# Patient Record
Sex: FEMALE | Race: WHITE | Employment: FULL TIME | URBAN - METROPOLITAN AREA
[De-identification: names, ages, dates, MRNs, and addresses within clinical notes are randomized per-mention and may not be internally consistent; named-entity substitution may affect disease eponyms.]

---

## 2017-06-28 ENCOUNTER — HOSPITAL ENCOUNTER (EMERGENCY)
Age: 57
Discharge: OTHER INSTITUTION | End: 2017-06-28
Attending: EMERGENCY MEDICINE
Payer: COMMERCIAL

## 2017-06-28 VITALS
RESPIRATION RATE: 20 BRPM | OXYGEN SATURATION: 96 % | HEART RATE: 82 BPM | WEIGHT: 293 LBS | TEMPERATURE: 99.7 F | SYSTOLIC BLOOD PRESSURE: 133 MMHG | DIASTOLIC BLOOD PRESSURE: 59 MMHG

## 2017-06-28 DIAGNOSIS — T50.905A DRUG REACTION, INITIAL ENCOUNTER: ICD-10-CM

## 2017-06-28 DIAGNOSIS — T81.40XA POSTOPERATIVE INFECTION, INITIAL ENCOUNTER: Primary | ICD-10-CM

## 2017-06-28 LAB
ALBUMIN SERPL-MCNC: 3.6 G/DL (ref 3.9–4.9)
ALP BLD-CCNC: 123 U/L (ref 40–130)
ALT SERPL-CCNC: 34 U/L (ref 0–33)
ANION GAP SERPL CALCULATED.3IONS-SCNC: 11 MEQ/L (ref 7–13)
APTT: 29.7 SEC (ref 21.6–35.4)
AST SERPL-CCNC: 22 U/L (ref 0–35)
BASOPHILS ABSOLUTE: 0 K/UL (ref 0–0.2)
BASOPHILS RELATIVE PERCENT: 0.2 %
BILIRUB SERPL-MCNC: 0.7 MG/DL (ref 0–1.2)
BUN BLDV-MCNC: 12 MG/DL (ref 6–20)
CALCIUM SERPL-MCNC: 8.7 MG/DL (ref 8.6–10.2)
CHLORIDE BLD-SCNC: 94 MEQ/L (ref 98–107)
CO2: 25 MEQ/L (ref 22–29)
CREAT SERPL-MCNC: 0.7 MG/DL (ref 0.5–0.9)
EOSINOPHILS ABSOLUTE: 0.2 K/UL (ref 0–0.7)
EOSINOPHILS RELATIVE PERCENT: 4.5 %
GFR AFRICAN AMERICAN: >60
GFR NON-AFRICAN AMERICAN: >60
GLOBULIN: 2.7 G/DL (ref 2.3–3.5)
GLUCOSE BLD-MCNC: 126 MG/DL (ref 74–109)
HCT VFR BLD CALC: 40 % (ref 37–47)
HEMOGLOBIN: 13.4 G/DL (ref 12–16)
INR BLD: 1.1
LACTIC ACID: 1 MMOL/L (ref 0.5–2.2)
LYMPHOCYTES ABSOLUTE: 0.3 K/UL (ref 1–4.8)
LYMPHOCYTES RELATIVE PERCENT: 6 %
MCH RBC QN AUTO: 25.7 PG (ref 27–31.3)
MCHC RBC AUTO-ENTMCNC: 33.5 % (ref 33–37)
MCV RBC AUTO: 76.8 FL (ref 82–100)
MONOCYTES ABSOLUTE: 0.4 K/UL (ref 0.2–0.8)
MONOCYTES RELATIVE PERCENT: 6.6 %
NEUTROPHILS ABSOLUTE: 4.5 K/UL (ref 1.4–6.5)
NEUTROPHILS RELATIVE PERCENT: 82.7 %
PDW BLD-RTO: 16.6 % (ref 11.5–14.5)
PLATELET # BLD: 151 K/UL (ref 130–400)
POTASSIUM SERPL-SCNC: 3.8 MEQ/L (ref 3.5–5.1)
PROTHROMBIN TIME: 11.9 SEC (ref 8.1–13.7)
RBC # BLD: 5.2 M/UL (ref 4.2–5.4)
SLIDE REVIEW: ABNORMAL
SODIUM BLD-SCNC: 130 MEQ/L (ref 132–144)
TOTAL PROTEIN: 6.3 G/DL (ref 6.4–8.1)
WBC # BLD: 5.4 K/UL (ref 4.8–10.8)

## 2017-06-28 PROCEDURE — 85730 THROMBOPLASTIN TIME PARTIAL: CPT

## 2017-06-28 PROCEDURE — 6370000000 HC RX 637 (ALT 250 FOR IP): Performed by: EMERGENCY MEDICINE

## 2017-06-28 PROCEDURE — 96365 THER/PROPH/DIAG IV INF INIT: CPT

## 2017-06-28 PROCEDURE — 96366 THER/PROPH/DIAG IV INF ADDON: CPT

## 2017-06-28 PROCEDURE — 36415 COLL VENOUS BLD VENIPUNCTURE: CPT

## 2017-06-28 PROCEDURE — 87040 BLOOD CULTURE FOR BACTERIA: CPT

## 2017-06-28 PROCEDURE — 85025 COMPLETE CBC W/AUTO DIFF WBC: CPT

## 2017-06-28 PROCEDURE — 85610 PROTHROMBIN TIME: CPT

## 2017-06-28 PROCEDURE — 80053 COMPREHEN METABOLIC PANEL: CPT

## 2017-06-28 PROCEDURE — 6360000002 HC RX W HCPCS: Performed by: PHYSICIAN ASSISTANT

## 2017-06-28 PROCEDURE — 2580000003 HC RX 258: Performed by: PHYSICIAN ASSISTANT

## 2017-06-28 PROCEDURE — 83605 ASSAY OF LACTIC ACID: CPT

## 2017-06-28 PROCEDURE — 99283 EMERGENCY DEPT VISIT LOW MDM: CPT

## 2017-06-28 PROCEDURE — 96375 TX/PRO/DX INJ NEW DRUG ADDON: CPT

## 2017-06-28 RX ORDER — DOCUSATE SODIUM 100 MG/1
100 CAPSULE, LIQUID FILLED ORAL DAILY
Status: ON HOLD | COMMUNITY
End: 2019-01-06

## 2017-06-28 RX ORDER — ONDANSETRON 2 MG/ML
4 INJECTION INTRAMUSCULAR; INTRAVENOUS ONCE
Status: COMPLETED | OUTPATIENT
Start: 2017-06-28 | End: 2017-06-28

## 2017-06-28 RX ORDER — SULFAMETHOXAZOLE AND TRIMETHOPRIM 800; 160 MG/1; MG/1
1 TABLET ORAL 2 TIMES DAILY
COMMUNITY
End: 2017-07-30

## 2017-06-28 RX ORDER — LOSARTAN POTASSIUM 100 MG/1
100 TABLET ORAL DAILY
Status: ON HOLD | COMMUNITY
End: 2019-01-16 | Stop reason: HOSPADM

## 2017-06-28 RX ORDER — DIPHENHYDRAMINE HYDROCHLORIDE 50 MG/ML
25 INJECTION INTRAMUSCULAR; INTRAVENOUS ONCE
Status: COMPLETED | OUTPATIENT
Start: 2017-06-28 | End: 2017-06-28

## 2017-06-28 RX ORDER — ETODOLAC 400 MG/1
400 TABLET, FILM COATED ORAL 2 TIMES DAILY PRN
Status: ON HOLD | COMMUNITY
End: 2019-01-06

## 2017-06-28 RX ORDER — DULOXETIN HYDROCHLORIDE 30 MG/1
30 CAPSULE, DELAYED RELEASE ORAL DAILY
Status: ON HOLD | COMMUNITY
End: 2019-02-06 | Stop reason: HOSPADM

## 2017-06-28 RX ORDER — LEVOTHYROXINE SODIUM 0.03 MG/1
50 TABLET ORAL DAILY
COMMUNITY

## 2017-06-28 RX ORDER — AMLODIPINE BESYLATE 5 MG/1
5 TABLET ORAL DAILY
Status: ON HOLD | COMMUNITY
End: 2019-01-06

## 2017-06-28 RX ORDER — BUPROPION HYDROCHLORIDE 300 MG/1
150 TABLET ORAL EVERY MORNING
Status: ON HOLD | COMMUNITY
End: 2019-02-06 | Stop reason: HOSPADM

## 2017-06-28 RX ORDER — GABAPENTIN 100 MG/1
100 CAPSULE ORAL 3 TIMES DAILY
Status: ON HOLD | COMMUNITY
End: 2019-01-06

## 2017-06-28 RX ORDER — ACETAMINOPHEN 500 MG
1000 TABLET ORAL ONCE
Status: COMPLETED | OUTPATIENT
Start: 2017-06-28 | End: 2017-06-28

## 2017-06-28 RX ORDER — POLYETHYLENE GLYCOL 3350 17 G/17G
17 POWDER, FOR SOLUTION ORAL DAILY PRN
Status: ON HOLD | COMMUNITY
End: 2019-01-06

## 2017-06-28 RX ORDER — OXYCODONE HYDROCHLORIDE AND ACETAMINOPHEN 5; 325 MG/1; MG/1
1 TABLET ORAL EVERY 8 HOURS PRN
Status: ON HOLD | COMMUNITY
End: 2019-01-06

## 2017-06-28 RX ORDER — CHLORTHALIDONE 25 MG/1
25 TABLET ORAL 2 TIMES DAILY
Status: ON HOLD | COMMUNITY
End: 2019-01-06

## 2017-06-28 RX ORDER — OXYCODONE HCL 10 MG/1
10 TABLET, FILM COATED, EXTENDED RELEASE ORAL EVERY 12 HOURS PRN
Status: ON HOLD | COMMUNITY
End: 2019-01-06 | Stop reason: ALTCHOICE

## 2017-06-28 RX ADMIN — DIPHENHYDRAMINE HYDROCHLORIDE 25 MG: 50 INJECTION, SOLUTION INTRAMUSCULAR; INTRAVENOUS at 15:46

## 2017-06-28 RX ADMIN — ONDANSETRON 4 MG: 2 INJECTION INTRAMUSCULAR; INTRAVENOUS at 15:46

## 2017-06-28 RX ADMIN — VANCOMYCIN HYDROCHLORIDE 1000 MG: 1 INJECTION, POWDER, LYOPHILIZED, FOR SOLUTION INTRAVENOUS at 15:47

## 2017-06-28 RX ADMIN — ACETAMINOPHEN 1000 MG: 500 TABLET ORAL at 17:01

## 2017-06-28 ASSESSMENT — PAIN SCALES - GENERAL
PAINLEVEL_OUTOF10: 8
PAINLEVEL_OUTOF10: 8
PAINLEVEL_OUTOF10: 5

## 2017-06-28 ASSESSMENT — PAIN DESCRIPTION - PAIN TYPE: TYPE: ACUTE PAIN

## 2017-06-28 ASSESSMENT — PAIN DESCRIPTION - LOCATION: LOCATION: NECK

## 2017-06-28 ASSESSMENT — PAIN DESCRIPTION - DESCRIPTORS: DESCRIPTORS: SHOOTING

## 2017-06-30 ASSESSMENT — ENCOUNTER SYMPTOMS
PHOTOPHOBIA: 0
COUGH: 0
SORE THROAT: 0
TROUBLE SWALLOWING: 0
SHORTNESS OF BREATH: 0
COLOR CHANGE: 1
NAUSEA: 0
VOMITING: 0
DIARRHEA: 0
ABDOMINAL PAIN: 0
BACK PAIN: 0

## 2017-07-03 LAB
BLOOD CULTURE, ROUTINE: NORMAL
CULTURE, BLOOD 2: NORMAL

## 2017-07-30 ENCOUNTER — APPOINTMENT (OUTPATIENT)
Dept: CT IMAGING | Age: 57
End: 2017-07-30
Payer: COMMERCIAL

## 2017-07-30 ENCOUNTER — HOSPITAL ENCOUNTER (EMERGENCY)
Age: 57
Discharge: HOME | End: 2017-07-30
Attending: FAMILY MEDICINE
Payer: COMMERCIAL

## 2017-07-30 VITALS
TEMPERATURE: 98.1 F | OXYGEN SATURATION: 99 % | BODY MASS INDEX: 57.52 KG/M2 | DIASTOLIC BLOOD PRESSURE: 68 MMHG | HEIGHT: 60 IN | HEART RATE: 94 BPM | WEIGHT: 293 LBS | SYSTOLIC BLOOD PRESSURE: 121 MMHG | RESPIRATION RATE: 18 BRPM

## 2017-07-30 DIAGNOSIS — R10.31 RIGHT LOWER QUADRANT ABDOMINAL PAIN: Primary | ICD-10-CM

## 2017-07-30 LAB
ALBUMIN SERPL-MCNC: 4.1 G/DL (ref 3.9–4.9)
ALP BLD-CCNC: 117 U/L (ref 40–130)
ALT SERPL-CCNC: 24 U/L (ref 0–33)
AMYLASE: 40 U/L (ref 28–100)
ANION GAP SERPL CALCULATED.3IONS-SCNC: 15 MEQ/L (ref 7–13)
AST SERPL-CCNC: 17 U/L (ref 0–35)
BACTERIA: NORMAL /HPF
BASOPHILS ABSOLUTE: 0.1 K/UL (ref 0–0.2)
BASOPHILS RELATIVE PERCENT: 0.7 %
BILIRUB SERPL-MCNC: 0.6 MG/DL (ref 0–1.2)
BILIRUBIN URINE: NEGATIVE
BLOOD, URINE: NEGATIVE
BUN BLDV-MCNC: 22 MG/DL (ref 6–20)
CALCIUM SERPL-MCNC: 9.3 MG/DL (ref 8.6–10.2)
CHLORIDE BLD-SCNC: 94 MEQ/L (ref 98–107)
CLARITY: CLEAR
CO2: 28 MEQ/L (ref 22–29)
COLOR: YELLOW
CREAT SERPL-MCNC: 0.74 MG/DL (ref 0.5–0.9)
EOSINOPHILS ABSOLUTE: 0.1 K/UL (ref 0–0.7)
EOSINOPHILS RELATIVE PERCENT: 1.4 %
EPITHELIAL CELLS, UA: NORMAL /HPF
GFR AFRICAN AMERICAN: >60
GFR NON-AFRICAN AMERICAN: >60
GLOBULIN: 2.7 G/DL (ref 2.3–3.5)
GLUCOSE BLD-MCNC: 99 MG/DL (ref 74–109)
GLUCOSE URINE: NEGATIVE MG/DL
HCT VFR BLD CALC: 41.1 % (ref 37–47)
HEMOGLOBIN: 13.5 G/DL (ref 12–16)
KETONES, URINE: NEGATIVE MG/DL
LEUKOCYTE ESTERASE, URINE: ABNORMAL
LIPASE: 23 U/L (ref 13–60)
LYMPHOCYTES ABSOLUTE: 2.2 K/UL (ref 1–4.8)
LYMPHOCYTES RELATIVE PERCENT: 23.1 %
MCH RBC QN AUTO: 25.3 PG (ref 27–31.3)
MCHC RBC AUTO-ENTMCNC: 32.8 % (ref 33–37)
MCV RBC AUTO: 77.1 FL (ref 82–100)
MONOCYTES ABSOLUTE: 0.8 K/UL (ref 0.2–0.8)
MONOCYTES RELATIVE PERCENT: 8.1 %
NEUTROPHILS ABSOLUTE: 6.2 K/UL (ref 1.4–6.5)
NEUTROPHILS RELATIVE PERCENT: 66.7 %
NITRITE, URINE: NEGATIVE
PDW BLD-RTO: 16.3 % (ref 11.5–14.5)
PH UA: 6.5 (ref 5–9)
PLATELET # BLD: 202 K/UL (ref 130–400)
POTASSIUM SERPL-SCNC: 3.2 MEQ/L (ref 3.5–5.1)
PROTEIN UA: NEGATIVE MG/DL
RBC # BLD: 5.33 M/UL (ref 4.2–5.4)
RBC UA: NORMAL /HPF (ref 0–2)
SODIUM BLD-SCNC: 137 MEQ/L (ref 132–144)
SPECIFIC GRAVITY UA: 1.01 (ref 1–1.03)
TOTAL PROTEIN: 6.8 G/DL (ref 6.4–8.1)
UROBILINOGEN, URINE: 0.2 E.U./DL
WBC # BLD: 9.3 K/UL (ref 4.8–10.8)
WBC UA: NORMAL /HPF (ref 0–5)

## 2017-07-30 PROCEDURE — 85025 COMPLETE CBC W/AUTO DIFF WBC: CPT

## 2017-07-30 PROCEDURE — 80053 COMPREHEN METABOLIC PANEL: CPT

## 2017-07-30 PROCEDURE — 99284 EMERGENCY DEPT VISIT MOD MDM: CPT

## 2017-07-30 PROCEDURE — 82150 ASSAY OF AMYLASE: CPT

## 2017-07-30 PROCEDURE — 74176 CT ABD & PELVIS W/O CONTRAST: CPT

## 2017-07-30 PROCEDURE — 81001 URINALYSIS AUTO W/SCOPE: CPT

## 2017-07-30 PROCEDURE — 83690 ASSAY OF LIPASE: CPT

## 2017-07-30 PROCEDURE — 36415 COLL VENOUS BLD VENIPUNCTURE: CPT

## 2017-07-30 RX ORDER — ALBUTEROL SULFATE 90 UG/1
2 AEROSOL, METERED RESPIRATORY (INHALATION) EVERY 6 HOURS PRN
Status: ON HOLD | COMMUNITY
End: 2019-01-06

## 2017-07-30 RX ORDER — DICYCLOMINE HYDROCHLORIDE 10 MG/1
10 CAPSULE ORAL
Qty: 30 CAPSULE | Refills: 0 | Status: ON HOLD | OUTPATIENT
Start: 2017-07-30 | End: 2019-01-16 | Stop reason: HOSPADM

## 2017-07-30 ASSESSMENT — PAIN SCALES - GENERAL: PAINLEVEL_OUTOF10: 7

## 2017-07-30 ASSESSMENT — ENCOUNTER SYMPTOMS
BELCHING: 0
CONSTIPATION: 0
COUGH: 0
ABDOMINAL PAIN: 1
SHORTNESS OF BREATH: 0
SORE THROAT: 0
HEMATOCHEZIA: 0
NAUSEA: 0
FLATUS: 0
HEMATEMESIS: 0

## 2017-07-30 ASSESSMENT — PAIN DESCRIPTION - ORIENTATION: ORIENTATION: RIGHT;LOWER

## 2017-07-30 ASSESSMENT — PAIN DESCRIPTION - DESCRIPTORS: DESCRIPTORS: SHARP

## 2017-07-30 ASSESSMENT — PAIN DESCRIPTION - PAIN TYPE: TYPE: ACUTE PAIN

## 2017-07-30 ASSESSMENT — PAIN DESCRIPTION - FREQUENCY: FREQUENCY: INTERMITTENT

## 2019-01-06 ENCOUNTER — HOSPITAL ENCOUNTER (INPATIENT)
Age: 59
LOS: 10 days | Discharge: HOME HEALTH | DRG: 710 | End: 2019-01-16
Attending: FAMILY MEDICINE | Admitting: INTERNAL MEDICINE
Payer: MEDICAID

## 2019-01-06 ENCOUNTER — APPOINTMENT (OUTPATIENT)
Dept: CT IMAGING | Age: 59
DRG: 710 | End: 2019-01-06
Payer: MEDICAID

## 2019-01-06 DIAGNOSIS — R65.10 SIRS (SYSTEMIC INFLAMMATORY RESPONSE SYNDROME) (HCC): ICD-10-CM

## 2019-01-06 DIAGNOSIS — K61.1 PERIRECTAL ABSCESS: ICD-10-CM

## 2019-01-06 DIAGNOSIS — N17.9 ACUTE RENAL FAILURE, UNSPECIFIED ACUTE RENAL FAILURE TYPE (HCC): ICD-10-CM

## 2019-01-06 DIAGNOSIS — E86.0 DEHYDRATION: ICD-10-CM

## 2019-01-06 DIAGNOSIS — K61.0 PERIANAL CELLULITIS: Primary | ICD-10-CM

## 2019-01-06 PROBLEM — M43.22 CERVICAL VERTEBRAL FUSION: Status: ACTIVE | Noted: 2017-06-05

## 2019-01-06 PROBLEM — E08.42 DIABETES MELLITUS DUE TO UNDERLYING CONDITION WITH DIABETIC POLYNEUROPATHY (HCC): Status: ACTIVE | Noted: 2017-06-08

## 2019-01-06 PROBLEM — J45.909 ASTHMA, CHRONIC: Status: ACTIVE | Noted: 2019-01-06

## 2019-01-06 PROBLEM — G95.9 CERVICAL MYELOPATHY (HCC): Status: ACTIVE | Noted: 2017-06-08

## 2019-01-06 PROBLEM — I87.2 STASIS DERMATITIS: Status: ACTIVE | Noted: 2019-01-06

## 2019-01-06 PROBLEM — I10 HTN (HYPERTENSION): Status: ACTIVE | Noted: 2019-01-06

## 2019-01-06 PROBLEM — A41.9 SEPSIS (HCC): Status: ACTIVE | Noted: 2019-01-06

## 2019-01-06 PROBLEM — E78.5 HYPERLIPIDEMIA: Status: ACTIVE | Noted: 2019-01-06

## 2019-01-06 LAB
ALBUMIN SERPL-MCNC: 3.7 G/DL (ref 3.9–4.9)
ALP BLD-CCNC: 139 U/L (ref 40–130)
ALT SERPL-CCNC: 17 U/L (ref 0–33)
AMYLASE: 23 U/L (ref 28–100)
ANION GAP SERPL CALCULATED.3IONS-SCNC: 15 MEQ/L (ref 7–13)
APTT: 34.3 SEC (ref 21.6–35.4)
AST SERPL-CCNC: 12 U/L (ref 0–35)
BACTERIA: NEGATIVE /HPF
BASOPHILS ABSOLUTE: 0 K/UL (ref 0–0.2)
BASOPHILS RELATIVE PERCENT: 0.1 %
BILIRUB SERPL-MCNC: 1.3 MG/DL (ref 0–1.2)
BILIRUBIN URINE: NEGATIVE
BLOOD, URINE: NEGATIVE
BUN BLDV-MCNC: 13 MG/DL (ref 6–20)
CALCIUM SERPL-MCNC: 9.3 MG/DL (ref 8.6–10.2)
CHLORIDE BLD-SCNC: 98 MEQ/L (ref 98–107)
CLARITY: CLEAR
CO2: 23 MEQ/L (ref 22–29)
COLOR: ABNORMAL
CREAT SERPL-MCNC: 0.77 MG/DL (ref 0.5–0.9)
EOSINOPHILS ABSOLUTE: 0 K/UL (ref 0–0.7)
EOSINOPHILS RELATIVE PERCENT: 0 %
EPITHELIAL CELLS, UA: NORMAL /HPF (ref 0–5)
GFR AFRICAN AMERICAN: >60
GFR NON-AFRICAN AMERICAN: >60
GLOBULIN: 3.7 G/DL (ref 2.3–3.5)
GLUCOSE BLD-MCNC: 135 MG/DL (ref 60–115)
GLUCOSE BLD-MCNC: 150 MG/DL (ref 74–109)
GLUCOSE BLD-MCNC: 183 MG/DL (ref 60–115)
GLUCOSE URINE: NEGATIVE MG/DL
HBA1C MFR BLD: 5.9 % (ref 4.8–5.9)
HCT VFR BLD CALC: 41.4 % (ref 37–47)
HEMOGLOBIN: 13.7 G/DL (ref 12–16)
HYALINE CASTS: NORMAL /HPF (ref 0–5)
INR BLD: 1.2
KETONES, URINE: 15 MG/DL
LACTIC ACID, SEPSIS: 1 MMOL/L (ref 0.5–1.9)
LEUKOCYTE ESTERASE, URINE: NEGATIVE
LIPASE: 19 U/L (ref 13–60)
LYMPHOCYTES ABSOLUTE: 0.7 K/UL (ref 1–4.8)
LYMPHOCYTES RELATIVE PERCENT: 4.4 %
MCH RBC QN AUTO: 25.8 PG (ref 27–31.3)
MCHC RBC AUTO-ENTMCNC: 33 % (ref 33–37)
MCV RBC AUTO: 78.3 FL (ref 82–100)
MONOCYTES ABSOLUTE: 1.3 K/UL (ref 0.2–0.8)
MONOCYTES RELATIVE PERCENT: 7.9 %
NEUTROPHILS ABSOLUTE: 13.8 K/UL (ref 1.4–6.5)
NEUTROPHILS RELATIVE PERCENT: 87.6 %
NITRITE, URINE: NEGATIVE
PDW BLD-RTO: 16.5 % (ref 11.5–14.5)
PERFORMED ON: ABNORMAL
PERFORMED ON: ABNORMAL
PH UA: 5 (ref 5–9)
PLATELET # BLD: 187 K/UL (ref 130–400)
POTASSIUM REFLEX MAGNESIUM: 3.7 MEQ/L (ref 3.5–5.1)
PROTEIN UA: 30 MG/DL
PROTHROMBIN TIME: 12.1 SEC (ref 9–11.5)
RBC # BLD: 5.29 M/UL (ref 4.2–5.4)
RBC UA: NORMAL /HPF (ref 0–2)
SODIUM BLD-SCNC: 136 MEQ/L (ref 132–144)
SPECIFIC GRAVITY UA: 1.03 (ref 1–1.03)
TOTAL PROTEIN: 7.4 G/DL (ref 6.4–8.1)
UROBILINOGEN, URINE: 0.2 E.U./DL
WBC # BLD: 15.8 K/UL (ref 4.8–10.8)
WBC UA: NORMAL /HPF (ref 0–5)

## 2019-01-06 PROCEDURE — 6370000000 HC RX 637 (ALT 250 FOR IP): Performed by: INTERNAL MEDICINE

## 2019-01-06 PROCEDURE — 83605 ASSAY OF LACTIC ACID: CPT

## 2019-01-06 PROCEDURE — 2500000003 HC RX 250 WO HCPCS: Performed by: INTERNAL MEDICINE

## 2019-01-06 PROCEDURE — 36415 COLL VENOUS BLD VENIPUNCTURE: CPT

## 2019-01-06 PROCEDURE — 83036 HEMOGLOBIN GLYCOSYLATED A1C: CPT

## 2019-01-06 PROCEDURE — 2580000003 HC RX 258: Performed by: FAMILY MEDICINE

## 2019-01-06 PROCEDURE — 96375 TX/PRO/DX INJ NEW DRUG ADDON: CPT

## 2019-01-06 PROCEDURE — 96366 THER/PROPH/DIAG IV INF ADDON: CPT

## 2019-01-06 PROCEDURE — 85730 THROMBOPLASTIN TIME PARTIAL: CPT

## 2019-01-06 PROCEDURE — 99284 EMERGENCY DEPT VISIT MOD MDM: CPT

## 2019-01-06 PROCEDURE — 85610 PROTHROMBIN TIME: CPT

## 2019-01-06 PROCEDURE — 1210000000 HC MED SURG R&B

## 2019-01-06 PROCEDURE — 85025 COMPLETE CBC W/AUTO DIFF WBC: CPT

## 2019-01-06 PROCEDURE — 80053 COMPREHEN METABOLIC PANEL: CPT

## 2019-01-06 PROCEDURE — 94664 DEMO&/EVAL PT USE INHALER: CPT

## 2019-01-06 PROCEDURE — 6360000002 HC RX W HCPCS: Performed by: INTERNAL MEDICINE

## 2019-01-06 PROCEDURE — 94150 VITAL CAPACITY TEST: CPT

## 2019-01-06 PROCEDURE — 81001 URINALYSIS AUTO W/SCOPE: CPT

## 2019-01-06 PROCEDURE — 83690 ASSAY OF LIPASE: CPT

## 2019-01-06 PROCEDURE — 87040 BLOOD CULTURE FOR BACTERIA: CPT

## 2019-01-06 PROCEDURE — 2500000003 HC RX 250 WO HCPCS: Performed by: FAMILY MEDICINE

## 2019-01-06 PROCEDURE — 96365 THER/PROPH/DIAG IV INF INIT: CPT

## 2019-01-06 PROCEDURE — 74177 CT ABD & PELVIS W/CONTRAST: CPT

## 2019-01-06 PROCEDURE — 2580000003 HC RX 258: Performed by: INTERNAL MEDICINE

## 2019-01-06 PROCEDURE — 6360000004 HC RX CONTRAST MEDICATION: Performed by: FAMILY MEDICINE

## 2019-01-06 PROCEDURE — S0028 INJECTION, FAMOTIDINE, 20 MG: HCPCS | Performed by: FAMILY MEDICINE

## 2019-01-06 PROCEDURE — 6360000002 HC RX W HCPCS: Performed by: FAMILY MEDICINE

## 2019-01-06 PROCEDURE — 6370000000 HC RX 637 (ALT 250 FOR IP): Performed by: FAMILY MEDICINE

## 2019-01-06 PROCEDURE — 82150 ASSAY OF AMYLASE: CPT

## 2019-01-06 RX ORDER — POLYETHYLENE GLYCOL 3350 17 G/17G
17 POWDER, FOR SOLUTION ORAL DAILY PRN
Status: DISCONTINUED | OUTPATIENT
Start: 2019-01-06 | End: 2019-01-16 | Stop reason: HOSPADM

## 2019-01-06 RX ORDER — CIPROFLOXACIN 2 MG/ML
400 INJECTION, SOLUTION INTRAVENOUS EVERY 12 HOURS
Status: DISCONTINUED | OUTPATIENT
Start: 2019-01-06 | End: 2019-01-06

## 2019-01-06 RX ORDER — TOPIRAMATE 25 MG/1
50 TABLET ORAL NIGHTLY
Status: DISCONTINUED | OUTPATIENT
Start: 2019-01-06 | End: 2019-01-16 | Stop reason: HOSPADM

## 2019-01-06 RX ORDER — PRAZOSIN HYDROCHLORIDE 1 MG/1
1 CAPSULE ORAL 2 TIMES DAILY
Status: DISCONTINUED | OUTPATIENT
Start: 2019-01-06 | End: 2019-01-09

## 2019-01-06 RX ORDER — CLINDAMYCIN PHOSPHATE 600 MG/50ML
600 INJECTION INTRAVENOUS EVERY 8 HOURS
Status: DISCONTINUED | OUTPATIENT
Start: 2019-01-06 | End: 2019-01-14

## 2019-01-06 RX ORDER — OXYCODONE HYDROCHLORIDE AND ACETAMINOPHEN 5; 325 MG/1; MG/1
1 TABLET ORAL EVERY 4 HOURS PRN
Status: DISCONTINUED | OUTPATIENT
Start: 2019-01-06 | End: 2019-01-16 | Stop reason: HOSPADM

## 2019-01-06 RX ORDER — TRAZODONE HYDROCHLORIDE 100 MG/1
100 TABLET ORAL NIGHTLY
Status: DISCONTINUED | OUTPATIENT
Start: 2019-01-06 | End: 2019-01-16 | Stop reason: HOSPADM

## 2019-01-06 RX ORDER — POLYETHYLENE GLYCOL 3350 17 G/17G
17 POWDER, FOR SOLUTION ORAL DAILY PRN
Status: DISCONTINUED | OUTPATIENT
Start: 2019-01-06 | End: 2019-01-06

## 2019-01-06 RX ORDER — TOPIRAMATE 50 MG/1
50 TABLET, FILM COATED ORAL NIGHTLY
Status: ON HOLD | COMMUNITY
End: 2019-02-06 | Stop reason: HOSPADM

## 2019-01-06 RX ORDER — MULTIVIT-MIN/IRON/FOLIC ACID/K 18-600-40
CAPSULE ORAL
COMMUNITY

## 2019-01-06 RX ORDER — SODIUM CHLORIDE 0.9 % (FLUSH) 0.9 %
10 SYRINGE (ML) INJECTION PRN
Status: DISCONTINUED | OUTPATIENT
Start: 2019-01-06 | End: 2019-01-16 | Stop reason: HOSPADM

## 2019-01-06 RX ORDER — KETOROLAC TROMETHAMINE 30 MG/ML
30 INJECTION, SOLUTION INTRAMUSCULAR; INTRAVENOUS EVERY 6 HOURS PRN
Status: DISCONTINUED | OUTPATIENT
Start: 2019-01-06 | End: 2019-01-09

## 2019-01-06 RX ORDER — 0.9 % SODIUM CHLORIDE 0.9 %
1000 INTRAVENOUS SOLUTION INTRAVENOUS ONCE
Status: COMPLETED | OUTPATIENT
Start: 2019-01-06 | End: 2019-01-06

## 2019-01-06 RX ORDER — ACETAMINOPHEN 500 MG
1000 TABLET ORAL ONCE
Status: COMPLETED | OUTPATIENT
Start: 2019-01-06 | End: 2019-01-06

## 2019-01-06 RX ORDER — DEXTROSE MONOHYDRATE 50 MG/ML
100 INJECTION, SOLUTION INTRAVENOUS PRN
Status: DISCONTINUED | OUTPATIENT
Start: 2019-01-06 | End: 2019-01-16 | Stop reason: HOSPADM

## 2019-01-06 RX ORDER — TRAZODONE HYDROCHLORIDE 100 MG/1
100 TABLET ORAL NIGHTLY
Status: ON HOLD | COMMUNITY
End: 2019-02-06 | Stop reason: HOSPADM

## 2019-01-06 RX ORDER — CLINDAMYCIN PHOSPHATE 900 MG/50ML
900 INJECTION INTRAVENOUS ONCE
Status: COMPLETED | OUTPATIENT
Start: 2019-01-06 | End: 2019-01-06

## 2019-01-06 RX ORDER — DULOXETIN HYDROCHLORIDE 30 MG/1
30 CAPSULE, DELAYED RELEASE ORAL DAILY
Status: DISCONTINUED | OUTPATIENT
Start: 2019-01-06 | End: 2019-01-16 | Stop reason: HOSPADM

## 2019-01-06 RX ORDER — NICOTINE POLACRILEX 4 MG
15 LOZENGE BUCCAL PRN
Status: DISCONTINUED | OUTPATIENT
Start: 2019-01-06 | End: 2019-01-16 | Stop reason: HOSPADM

## 2019-01-06 RX ORDER — SODIUM CHLORIDE 0.9 % (FLUSH) 0.9 %
10 SYRINGE (ML) INJECTION EVERY 12 HOURS SCHEDULED
Status: DISCONTINUED | OUTPATIENT
Start: 2019-01-06 | End: 2019-01-16 | Stop reason: HOSPADM

## 2019-01-06 RX ORDER — OXYCODONE HCL 10 MG/1
10 TABLET, FILM COATED, EXTENDED RELEASE ORAL EVERY 12 HOURS PRN
Status: DISCONTINUED | OUTPATIENT
Start: 2019-01-06 | End: 2019-01-16 | Stop reason: HOSPADM

## 2019-01-06 RX ORDER — DEXTROSE MONOHYDRATE 25 G/50ML
12.5 INJECTION, SOLUTION INTRAVENOUS PRN
Status: DISCONTINUED | OUTPATIENT
Start: 2019-01-06 | End: 2019-01-16 | Stop reason: HOSPADM

## 2019-01-06 RX ORDER — ASPIRIN 81 MG/1
81 TABLET, CHEWABLE ORAL DAILY
Status: DISCONTINUED | OUTPATIENT
Start: 2019-01-06 | End: 2019-01-16 | Stop reason: HOSPADM

## 2019-01-06 RX ORDER — BUPROPION HYDROCHLORIDE 150 MG/1
150 TABLET ORAL EVERY MORNING
Status: DISCONTINUED | OUTPATIENT
Start: 2019-01-07 | End: 2019-01-16 | Stop reason: HOSPADM

## 2019-01-06 RX ORDER — PRAZOSIN HYDROCHLORIDE 1 MG/1
1 CAPSULE ORAL 2 TIMES DAILY
Status: ON HOLD | COMMUNITY
End: 2019-01-16 | Stop reason: HOSPADM

## 2019-01-06 RX ORDER — ALBUTEROL SULFATE 90 UG/1
2 AEROSOL, METERED RESPIRATORY (INHALATION) EVERY 6 HOURS PRN
Status: DISCONTINUED | OUTPATIENT
Start: 2019-01-06 | End: 2019-01-12

## 2019-01-06 RX ORDER — SODIUM CHLORIDE 9 MG/ML
INJECTION, SOLUTION INTRAVENOUS CONTINUOUS
Status: DISPENSED | OUTPATIENT
Start: 2019-01-06 | End: 2019-01-07

## 2019-01-06 RX ORDER — AMLODIPINE BESYLATE 5 MG/1
5 TABLET ORAL DAILY
Status: DISCONTINUED | OUTPATIENT
Start: 2019-01-06 | End: 2019-01-16

## 2019-01-06 RX ORDER — LOSARTAN POTASSIUM 50 MG/1
100 TABLET ORAL DAILY
Status: DISCONTINUED | OUTPATIENT
Start: 2019-01-06 | End: 2019-01-08

## 2019-01-06 RX ORDER — LEVOTHYROXINE SODIUM 0.05 MG/1
50 TABLET ORAL DAILY
Status: DISCONTINUED | OUTPATIENT
Start: 2019-01-06 | End: 2019-01-16 | Stop reason: HOSPADM

## 2019-01-06 RX ORDER — ONDANSETRON 2 MG/ML
4 INJECTION INTRAMUSCULAR; INTRAVENOUS EVERY 6 HOURS PRN
Status: DISCONTINUED | OUTPATIENT
Start: 2019-01-06 | End: 2019-01-16 | Stop reason: HOSPADM

## 2019-01-06 RX ORDER — GABAPENTIN 100 MG/1
100 CAPSULE ORAL 3 TIMES DAILY
Status: DISCONTINUED | OUTPATIENT
Start: 2019-01-06 | End: 2019-01-16 | Stop reason: HOSPADM

## 2019-01-06 RX ORDER — ONDANSETRON 2 MG/ML
4 INJECTION INTRAMUSCULAR; INTRAVENOUS ONCE
Status: COMPLETED | OUTPATIENT
Start: 2019-01-06 | End: 2019-01-06

## 2019-01-06 RX ORDER — DOCUSATE SODIUM 100 MG/1
100 CAPSULE, LIQUID FILLED ORAL DAILY
Status: DISCONTINUED | OUTPATIENT
Start: 2019-01-06 | End: 2019-01-09

## 2019-01-06 RX ADMIN — TRAZODONE HYDROCHLORIDE 100 MG: 100 TABLET ORAL at 21:18

## 2019-01-06 RX ADMIN — LURASIDONE HYDROCHLORIDE 60 MG: 40 TABLET, FILM COATED ORAL at 16:09

## 2019-01-06 RX ADMIN — CLINDAMYCIN PHOSPHATE 900 MG: 900 INJECTION, SOLUTION INTRAVENOUS at 12:02

## 2019-01-06 RX ADMIN — SODIUM CHLORIDE: 9 INJECTION, SOLUTION INTRAVENOUS at 16:07

## 2019-01-06 RX ADMIN — INSULIN LISPRO 1 UNITS: 100 INJECTION, SOLUTION INTRAVENOUS; SUBCUTANEOUS at 23:58

## 2019-01-06 RX ADMIN — PRAZOSIN HYDROCHLORIDE 1 MG: 1 CAPSULE ORAL at 21:22

## 2019-01-06 RX ADMIN — LINAGLIPTIN 5 MG: 5 TABLET, FILM COATED ORAL at 16:09

## 2019-01-06 RX ADMIN — OXYCODONE AND ACETAMINOPHEN 1 TABLET: 5; 325 TABLET ORAL at 21:37

## 2019-01-06 RX ADMIN — ASPIRIN 81 MG 81 MG: 81 TABLET ORAL at 16:10

## 2019-01-06 RX ADMIN — VITAMIN D, TAB 1000IU (100/BT) 2000 UNITS: 25 TAB at 16:09

## 2019-01-06 RX ADMIN — SODIUM CHLORIDE 1000 ML: 9 INJECTION, SOLUTION INTRAVENOUS at 12:02

## 2019-01-06 RX ADMIN — OXYCODONE HYDROCHLORIDE 10 MG: 10 TABLET, FILM COATED, EXTENDED RELEASE ORAL at 16:15

## 2019-01-06 RX ADMIN — FAMOTIDINE 20 MG: 10 INJECTION, SOLUTION INTRAVENOUS at 12:02

## 2019-01-06 RX ADMIN — ENOXAPARIN SODIUM 40 MG: 40 INJECTION SUBCUTANEOUS at 16:07

## 2019-01-06 RX ADMIN — LOSARTAN POTASSIUM 100 MG: 50 TABLET ORAL at 16:09

## 2019-01-06 RX ADMIN — CLINDAMYCIN IN 5 PERCENT DEXTROSE 600 MG: 12 INJECTION, SOLUTION INTRAVENOUS at 21:14

## 2019-01-06 RX ADMIN — TOPIRAMATE 50 MG: 25 TABLET, FILM COATED ORAL at 21:23

## 2019-01-06 RX ADMIN — IOPAMIDOL 100 ML: 612 INJECTION, SOLUTION INTRAVENOUS at 12:37

## 2019-01-06 RX ADMIN — ACETAMINOPHEN 1000 MG: 500 TABLET ORAL at 12:02

## 2019-01-06 RX ADMIN — ONDANSETRON 4 MG: 2 INJECTION INTRAMUSCULAR; INTRAVENOUS at 12:02

## 2019-01-06 RX ADMIN — DULOXETINE HYDROCHLORIDE 30 MG: 30 CAPSULE, DELAYED RELEASE ORAL at 16:10

## 2019-01-06 RX ADMIN — VANCOMYCIN HYDROCHLORIDE 1500 MG: 5 INJECTION, POWDER, LYOPHILIZED, FOR SOLUTION INTRAVENOUS at 14:23

## 2019-01-06 ASSESSMENT — PAIN SCALES - GENERAL
PAINLEVEL_OUTOF10: 8
PAINLEVEL_OUTOF10: 10
PAINLEVEL_OUTOF10: 10
PAINLEVEL_OUTOF10: 8

## 2019-01-06 ASSESSMENT — PAIN DESCRIPTION - LOCATION
LOCATION: BUTTOCKS
LOCATION: BUTTOCKS

## 2019-01-06 ASSESSMENT — PAIN DESCRIPTION - PROGRESSION: CLINICAL_PROGRESSION: GRADUALLY WORSENING

## 2019-01-06 ASSESSMENT — ENCOUNTER SYMPTOMS
EYES NEGATIVE: 1
ALLERGIC/IMMUNOLOGIC NEGATIVE: 1
GASTROINTESTINAL NEGATIVE: 1
RESPIRATORY NEGATIVE: 1

## 2019-01-06 ASSESSMENT — PAIN DESCRIPTION - ONSET: ONSET: ON-GOING

## 2019-01-06 ASSESSMENT — PAIN DESCRIPTION - FREQUENCY: FREQUENCY: CONTINUOUS

## 2019-01-06 ASSESSMENT — PAIN DESCRIPTION - PAIN TYPE
TYPE: ACUTE PAIN
TYPE: ACUTE PAIN

## 2019-01-06 ASSESSMENT — PAIN DESCRIPTION - DESCRIPTORS: DESCRIPTORS: SHARP;THROBBING

## 2019-01-07 ENCOUNTER — ANESTHESIA (OUTPATIENT)
Dept: OPERATING ROOM | Age: 59
DRG: 710 | End: 2019-01-07
Payer: MEDICAID

## 2019-01-07 ENCOUNTER — ANESTHESIA EVENT (OUTPATIENT)
Dept: OPERATING ROOM | Age: 59
DRG: 710 | End: 2019-01-07
Payer: MEDICAID

## 2019-01-07 ENCOUNTER — APPOINTMENT (OUTPATIENT)
Dept: CT IMAGING | Age: 59
DRG: 710 | End: 2019-01-07
Payer: MEDICAID

## 2019-01-07 VITALS — OXYGEN SATURATION: 94 % | TEMPERATURE: 100.2 F | DIASTOLIC BLOOD PRESSURE: 84 MMHG | SYSTOLIC BLOOD PRESSURE: 153 MMHG

## 2019-01-07 LAB
ALBUMIN SERPL-MCNC: 3 G/DL (ref 3.9–4.9)
ALP BLD-CCNC: 121 U/L (ref 40–130)
ALT SERPL-CCNC: 16 U/L (ref 0–33)
ANION GAP SERPL CALCULATED.3IONS-SCNC: 13 MEQ/L (ref 7–13)
AST SERPL-CCNC: 12 U/L (ref 0–35)
BASOPHILS ABSOLUTE: 0 K/UL (ref 0–0.2)
BASOPHILS RELATIVE PERCENT: 0.1 %
BILIRUB SERPL-MCNC: 1.1 MG/DL (ref 0–1.2)
BUN BLDV-MCNC: 19 MG/DL (ref 6–20)
CALCIUM SERPL-MCNC: 8.4 MG/DL (ref 8.6–10.2)
CHLORIDE BLD-SCNC: 99 MEQ/L (ref 98–107)
CO2: 22 MEQ/L (ref 22–29)
CREAT SERPL-MCNC: 1.4 MG/DL (ref 0.5–0.9)
EOSINOPHILS ABSOLUTE: 0.1 K/UL (ref 0–0.7)
EOSINOPHILS RELATIVE PERCENT: 0.3 %
GFR AFRICAN AMERICAN: 46.9
GFR AFRICAN AMERICAN: >60
GFR NON-AFRICAN AMERICAN: 38.8
GFR NON-AFRICAN AMERICAN: >60
GLOBULIN: 3.2 G/DL (ref 2.3–3.5)
GLUCOSE BLD-MCNC: 123 MG/DL (ref 60–115)
GLUCOSE BLD-MCNC: 123 MG/DL (ref 60–115)
GLUCOSE BLD-MCNC: 129 MG/DL
GLUCOSE BLD-MCNC: 129 MG/DL (ref 60–115)
GLUCOSE BLD-MCNC: 144 MG/DL (ref 60–115)
GLUCOSE BLD-MCNC: 150 MG/DL (ref 74–109)
HCT VFR BLD CALC: 35.4 % (ref 37–47)
HEMOGLOBIN: 11.8 G/DL (ref 12–16)
LYMPHOCYTES ABSOLUTE: 0.9 K/UL (ref 1–4.8)
LYMPHOCYTES RELATIVE PERCENT: 5.6 %
MAGNESIUM: 1.7 MG/DL (ref 1.7–2.3)
MCH RBC QN AUTO: 26 PG (ref 27–31.3)
MCHC RBC AUTO-ENTMCNC: 33.3 % (ref 33–37)
MCV RBC AUTO: 78 FL (ref 82–100)
MONOCYTES ABSOLUTE: 1.3 K/UL (ref 0.2–0.8)
MONOCYTES RELATIVE PERCENT: 8.7 %
NEUTROPHILS ABSOLUTE: 13.2 K/UL (ref 1.4–6.5)
NEUTROPHILS RELATIVE PERCENT: 85.3 %
PDW BLD-RTO: 16.6 % (ref 11.5–14.5)
PERFORMED ON: ABNORMAL
PERFORMED ON: NORMAL
PLATELET # BLD: 177 K/UL (ref 130–400)
POC CREATININE: 0.9 MG/DL (ref 0.6–1.1)
POC SAMPLE TYPE: NORMAL
POTASSIUM REFLEX MAGNESIUM: 3.8 MEQ/L (ref 3.5–5.1)
RBC # BLD: 4.54 M/UL (ref 4.2–5.4)
SODIUM BLD-SCNC: 134 MEQ/L (ref 132–144)
TOTAL PROTEIN: 6.2 G/DL (ref 6.4–8.1)
VANCOMYCIN TROUGH: 20 UG/ML (ref 10–20)
WBC # BLD: 15.5 K/UL (ref 4.8–10.8)

## 2019-01-07 PROCEDURE — 80202 ASSAY OF VANCOMYCIN: CPT

## 2019-01-07 PROCEDURE — 46040 I&D ISCHIORCT&/PERIRCT ABSC: CPT | Performed by: COLON & RECTAL SURGERY

## 2019-01-07 PROCEDURE — 2709999900 HC NON-CHARGEABLE SUPPLY: Performed by: COLON & RECTAL SURGERY

## 2019-01-07 PROCEDURE — 2580000003 HC RX 258: Performed by: COLON & RECTAL SURGERY

## 2019-01-07 PROCEDURE — 83735 ASSAY OF MAGNESIUM: CPT

## 2019-01-07 PROCEDURE — 87040 BLOOD CULTURE FOR BACTERIA: CPT

## 2019-01-07 PROCEDURE — 87070 CULTURE OTHR SPECIMN AEROBIC: CPT

## 2019-01-07 PROCEDURE — 2580000003 HC RX 258: Performed by: INTERNAL MEDICINE

## 2019-01-07 PROCEDURE — 3700000000 HC ANESTHESIA ATTENDED CARE: Performed by: COLON & RECTAL SURGERY

## 2019-01-07 PROCEDURE — 6360000002 HC RX W HCPCS: Performed by: NURSE ANESTHETIST, CERTIFIED REGISTERED

## 2019-01-07 PROCEDURE — 6370000000 HC RX 637 (ALT 250 FOR IP): Performed by: INTERNAL MEDICINE

## 2019-01-07 PROCEDURE — 99253 IP/OBS CNSLTJ NEW/EST LOW 45: CPT | Performed by: COLON & RECTAL SURGERY

## 2019-01-07 PROCEDURE — 2500000003 HC RX 250 WO HCPCS: Performed by: INTERNAL MEDICINE

## 2019-01-07 PROCEDURE — 93010 ELECTROCARDIOGRAM REPORT: CPT | Performed by: INTERNAL MEDICINE

## 2019-01-07 PROCEDURE — 6370000000 HC RX 637 (ALT 250 FOR IP)

## 2019-01-07 PROCEDURE — 7100000000 HC PACU RECOVERY - FIRST 15 MIN: Performed by: COLON & RECTAL SURGERY

## 2019-01-07 PROCEDURE — 6360000002 HC RX W HCPCS: Performed by: INTERNAL MEDICINE

## 2019-01-07 PROCEDURE — 87075 CULTR BACTERIA EXCEPT BLOOD: CPT

## 2019-01-07 PROCEDURE — 2500000003 HC RX 250 WO HCPCS: Performed by: NURSE ANESTHETIST, CERTIFIED REGISTERED

## 2019-01-07 PROCEDURE — 70450 CT HEAD/BRAIN W/O DYE: CPT

## 2019-01-07 PROCEDURE — 7100000001 HC PACU RECOVERY - ADDTL 15 MIN: Performed by: COLON & RECTAL SURGERY

## 2019-01-07 PROCEDURE — 87205 SMEAR GRAM STAIN: CPT

## 2019-01-07 PROCEDURE — 3600000003 HC SURGERY LEVEL 3 BASE: Performed by: COLON & RECTAL SURGERY

## 2019-01-07 PROCEDURE — 87077 CULTURE AEROBIC IDENTIFY: CPT

## 2019-01-07 PROCEDURE — 3600000013 HC SURGERY LEVEL 3 ADDTL 15MIN: Performed by: COLON & RECTAL SURGERY

## 2019-01-07 PROCEDURE — 1210000000 HC MED SURG R&B

## 2019-01-07 PROCEDURE — 85025 COMPLETE CBC W/AUTO DIFF WBC: CPT

## 2019-01-07 PROCEDURE — 36415 COLL VENOUS BLD VENIPUNCTURE: CPT

## 2019-01-07 PROCEDURE — 3700000001 HC ADD 15 MINUTES (ANESTHESIA): Performed by: COLON & RECTAL SURGERY

## 2019-01-07 PROCEDURE — 93005 ELECTROCARDIOGRAM TRACING: CPT

## 2019-01-07 PROCEDURE — 80053 COMPREHEN METABOLIC PANEL: CPT

## 2019-01-07 PROCEDURE — 0D9P0ZZ DRAINAGE OF RECTUM, OPEN APPROACH: ICD-10-PCS | Performed by: COLON & RECTAL SURGERY

## 2019-01-07 RX ORDER — HYDROCODONE BITARTRATE AND ACETAMINOPHEN 5; 325 MG/1; MG/1
1 TABLET ORAL PRN
Status: ACTIVE | OUTPATIENT
Start: 2019-01-07 | End: 2019-01-07

## 2019-01-07 RX ORDER — PROPOFOL 10 MG/ML
INJECTION, EMULSION INTRAVENOUS PRN
Status: DISCONTINUED | OUTPATIENT
Start: 2019-01-07 | End: 2019-01-07 | Stop reason: SDUPTHER

## 2019-01-07 RX ORDER — ONDANSETRON 2 MG/ML
INJECTION INTRAMUSCULAR; INTRAVENOUS PRN
Status: DISCONTINUED | OUTPATIENT
Start: 2019-01-07 | End: 2019-01-07 | Stop reason: SDUPTHER

## 2019-01-07 RX ORDER — METOCLOPRAMIDE HYDROCHLORIDE 5 MG/ML
10 INJECTION INTRAMUSCULAR; INTRAVENOUS
Status: ACTIVE | OUTPATIENT
Start: 2019-01-07 | End: 2019-01-07

## 2019-01-07 RX ORDER — DIPHENHYDRAMINE HYDROCHLORIDE 50 MG/ML
12.5 INJECTION INTRAMUSCULAR; INTRAVENOUS
Status: ACTIVE | OUTPATIENT
Start: 2019-01-07 | End: 2019-01-07

## 2019-01-07 RX ORDER — SUCCINYLCHOLINE/SOD CL,ISO/PF 100 MG/5ML
SYRINGE (ML) INTRAVENOUS PRN
Status: DISCONTINUED | OUTPATIENT
Start: 2019-01-07 | End: 2019-01-07 | Stop reason: SDUPTHER

## 2019-01-07 RX ORDER — MEPERIDINE HYDROCHLORIDE 25 MG/ML
12.5 INJECTION INTRAMUSCULAR; INTRAVENOUS; SUBCUTANEOUS EVERY 5 MIN PRN
Status: DISCONTINUED | OUTPATIENT
Start: 2019-01-07 | End: 2019-01-16 | Stop reason: HOSPADM

## 2019-01-07 RX ORDER — ONDANSETRON 2 MG/ML
4 INJECTION INTRAMUSCULAR; INTRAVENOUS
Status: ACTIVE | OUTPATIENT
Start: 2019-01-07 | End: 2019-01-07

## 2019-01-07 RX ORDER — FENTANYL CITRATE 50 UG/ML
50 INJECTION, SOLUTION INTRAMUSCULAR; INTRAVENOUS EVERY 10 MIN PRN
Status: DISCONTINUED | OUTPATIENT
Start: 2019-01-07 | End: 2019-01-16 | Stop reason: HOSPADM

## 2019-01-07 RX ORDER — FENTANYL CITRATE 50 UG/ML
INJECTION, SOLUTION INTRAMUSCULAR; INTRAVENOUS PRN
Status: DISCONTINUED | OUTPATIENT
Start: 2019-01-07 | End: 2019-01-07 | Stop reason: SDUPTHER

## 2019-01-07 RX ORDER — MAGNESIUM HYDROXIDE 1200 MG/15ML
LIQUID ORAL CONTINUOUS PRN
Status: COMPLETED | OUTPATIENT
Start: 2019-01-07 | End: 2019-01-07

## 2019-01-07 RX ORDER — HYDROCODONE BITARTRATE AND ACETAMINOPHEN 5; 325 MG/1; MG/1
2 TABLET ORAL PRN
Status: ACTIVE | OUTPATIENT
Start: 2019-01-07 | End: 2019-01-07

## 2019-01-07 RX ORDER — MIDAZOLAM HYDROCHLORIDE 1 MG/ML
INJECTION INTRAMUSCULAR; INTRAVENOUS PRN
Status: DISCONTINUED | OUTPATIENT
Start: 2019-01-07 | End: 2019-01-07 | Stop reason: SDUPTHER

## 2019-01-07 RX ORDER — ROCURONIUM BROMIDE 10 MG/ML
INJECTION, SOLUTION INTRAVENOUS PRN
Status: DISCONTINUED | OUTPATIENT
Start: 2019-01-07 | End: 2019-01-07 | Stop reason: SDUPTHER

## 2019-01-07 RX ORDER — LIDOCAINE HYDROCHLORIDE 20 MG/ML
INJECTION, SOLUTION INFILTRATION; PERINEURAL PRN
Status: DISCONTINUED | OUTPATIENT
Start: 2019-01-07 | End: 2019-01-07 | Stop reason: SDUPTHER

## 2019-01-07 RX ORDER — ACETAMINOPHEN 325 MG/1
650 TABLET ORAL EVERY 6 HOURS PRN
Status: DISCONTINUED | OUTPATIENT
Start: 2019-01-07 | End: 2019-01-16 | Stop reason: HOSPADM

## 2019-01-07 RX ADMIN — ENOXAPARIN SODIUM 40 MG: 40 INJECTION SUBCUTANEOUS at 15:56

## 2019-01-07 RX ADMIN — PRAZOSIN HYDROCHLORIDE 1 MG: 1 CAPSULE ORAL at 21:27

## 2019-01-07 RX ADMIN — VANCOMYCIN HYDROCHLORIDE 1500 MG: 5 INJECTION, POWDER, LYOPHILIZED, FOR SOLUTION INTRAVENOUS at 00:04

## 2019-01-07 RX ADMIN — LIDOCAINE HYDROCHLORIDE 80 MG: 20 INJECTION, SOLUTION INFILTRATION; PERINEURAL at 11:55

## 2019-01-07 RX ADMIN — VANCOMYCIN 1500 MG: 1 INJECTION, SOLUTION INTRAVENOUS at 23:56

## 2019-01-07 RX ADMIN — SODIUM CHLORIDE: 9 INJECTION, SOLUTION INTRAVENOUS at 05:39

## 2019-01-07 RX ADMIN — FENTANYL CITRATE 50 MCG: 50 INJECTION, SOLUTION INTRAMUSCULAR; INTRAVENOUS at 11:55

## 2019-01-07 RX ADMIN — CLINDAMYCIN IN 5 PERCENT DEXTROSE 600 MG: 12 INJECTION, SOLUTION INTRAVENOUS at 05:40

## 2019-01-07 RX ADMIN — TRAZODONE HYDROCHLORIDE 100 MG: 100 TABLET ORAL at 21:27

## 2019-01-07 RX ADMIN — CLINDAMYCIN IN 5 PERCENT DEXTROSE 600 MG: 12 INJECTION, SOLUTION INTRAVENOUS at 21:27

## 2019-01-07 RX ADMIN — FENTANYL CITRATE 50 MCG: 50 INJECTION, SOLUTION INTRAMUSCULAR; INTRAVENOUS at 12:16

## 2019-01-07 RX ADMIN — ROCURONIUM BROMIDE 20 MG: 10 INJECTION INTRAVENOUS at 12:05

## 2019-01-07 RX ADMIN — SUGAMMADEX 311 MG: 100 INJECTION, SOLUTION INTRAVENOUS at 12:24

## 2019-01-07 RX ADMIN — Medication 140 MG: at 11:55

## 2019-01-07 RX ADMIN — GABAPENTIN 100 MG: 100 CAPSULE ORAL at 21:27

## 2019-01-07 RX ADMIN — Medication 10 ML: at 21:34

## 2019-01-07 RX ADMIN — GABAPENTIN 100 MG: 100 CAPSULE ORAL at 14:44

## 2019-01-07 RX ADMIN — ONDANSETRON 4 MG: 2 INJECTION INTRAMUSCULAR; INTRAVENOUS at 12:15

## 2019-01-07 RX ADMIN — VANCOMYCIN HYDROCHLORIDE 1500 MG: 5 INJECTION, POWDER, LYOPHILIZED, FOR SOLUTION INTRAVENOUS at 11:26

## 2019-01-07 RX ADMIN — TOPIRAMATE 50 MG: 25 TABLET, FILM COATED ORAL at 21:27

## 2019-01-07 RX ADMIN — ACETAMINOPHEN 650 MG: 325 TABLET ORAL at 21:27

## 2019-01-07 RX ADMIN — PROPOFOL 200 MG: 10 INJECTION, EMULSION INTRAVENOUS at 11:55

## 2019-01-07 RX ADMIN — MIDAZOLAM HYDROCHLORIDE 2 MG: 1 INJECTION, SOLUTION INTRAMUSCULAR; INTRAVENOUS at 11:48

## 2019-01-07 RX ADMIN — LEVOTHYROXINE SODIUM 50 MCG: 50 TABLET ORAL at 05:41

## 2019-01-07 ASSESSMENT — PULMONARY FUNCTION TESTS
PIF_VALUE: 2
PIF_VALUE: 35
PIF_VALUE: 28
PIF_VALUE: 37
PIF_VALUE: 24
PIF_VALUE: 36
PIF_VALUE: 38
PIF_VALUE: 36
PIF_VALUE: 1
PIF_VALUE: 36
PIF_VALUE: 2
PIF_VALUE: 35
PIF_VALUE: 36
PIF_VALUE: 36
PIF_VALUE: 9
PIF_VALUE: 0
PIF_VALUE: 39
PIF_VALUE: 2
PIF_VALUE: 23
PIF_VALUE: 36
PIF_VALUE: 20
PIF_VALUE: 3
PIF_VALUE: 36
PIF_VALUE: 1
PIF_VALUE: 36
PIF_VALUE: 35
PIF_VALUE: 21
PIF_VALUE: 14
PIF_VALUE: 0
PIF_VALUE: 5
PIF_VALUE: 37
PIF_VALUE: 35
PIF_VALUE: 3
PIF_VALUE: 14
PIF_VALUE: 36
PIF_VALUE: 36
PIF_VALUE: 0
PIF_VALUE: 21
PIF_VALUE: 17
PIF_VALUE: 35

## 2019-01-07 ASSESSMENT — ENCOUNTER SYMPTOMS
RECTAL PAIN: 1
SHORTNESS OF BREATH: 0
STRIDOR: 0
WHEEZING: 0
NAUSEA: 0
DIARRHEA: 0
ANAL BLEEDING: 0

## 2019-01-07 ASSESSMENT — PAIN SCALES - GENERAL: PAINLEVEL_OUTOF10: 2

## 2019-01-08 ENCOUNTER — APPOINTMENT (OUTPATIENT)
Dept: ULTRASOUND IMAGING | Age: 59
DRG: 710 | End: 2019-01-08
Payer: MEDICAID

## 2019-01-08 LAB
ANION GAP SERPL CALCULATED.3IONS-SCNC: 17 MEQ/L (ref 7–13)
BASOPHILS ABSOLUTE: 0.1 K/UL (ref 0–0.2)
BASOPHILS RELATIVE PERCENT: 0.4 %
BUN BLDV-MCNC: 27 MG/DL (ref 6–20)
CALCIUM SERPL-MCNC: 8.7 MG/DL (ref 8.6–10.2)
CHLORIDE BLD-SCNC: 100 MEQ/L (ref 98–107)
CO2: 18 MEQ/L (ref 22–29)
CREAT SERPL-MCNC: 2.39 MG/DL (ref 0.5–0.9)
EOSINOPHILS ABSOLUTE: 0.2 K/UL (ref 0–0.7)
EOSINOPHILS RELATIVE PERCENT: 1.2 %
GFR AFRICAN AMERICAN: 25.3
GFR NON-AFRICAN AMERICAN: 20.9
GLUCOSE BLD-MCNC: 119 MG/DL (ref 60–115)
GLUCOSE BLD-MCNC: 120 MG/DL (ref 74–109)
GLUCOSE BLD-MCNC: 145 MG/DL (ref 60–115)
GLUCOSE BLD-MCNC: 155 MG/DL (ref 60–115)
GLUCOSE BLD-MCNC: 174 MG/DL (ref 60–115)
HCT VFR BLD CALC: 33.3 % (ref 37–47)
HEMOGLOBIN: 11.1 G/DL (ref 12–16)
LYMPHOCYTES ABSOLUTE: 0.8 K/UL (ref 1–4.8)
LYMPHOCYTES RELATIVE PERCENT: 5.9 %
MCH RBC QN AUTO: 25.8 PG (ref 27–31.3)
MCHC RBC AUTO-ENTMCNC: 33.2 % (ref 33–37)
MCV RBC AUTO: 77.7 FL (ref 82–100)
MONOCYTES ABSOLUTE: 1.1 K/UL (ref 0.2–0.8)
MONOCYTES RELATIVE PERCENT: 7.9 %
NEUTROPHILS ABSOLUTE: 11.6 K/UL (ref 1.4–6.5)
NEUTROPHILS RELATIVE PERCENT: 84.6 %
PDW BLD-RTO: 16.4 % (ref 11.5–14.5)
PERFORMED ON: ABNORMAL
PLATELET # BLD: 169 K/UL (ref 130–400)
POTASSIUM SERPL-SCNC: 3.7 MEQ/L (ref 3.5–5.1)
RBC # BLD: 4.29 M/UL (ref 4.2–5.4)
SODIUM BLD-SCNC: 135 MEQ/L (ref 132–144)
WBC # BLD: 13.7 K/UL (ref 4.8–10.8)

## 2019-01-08 PROCEDURE — 80048 BASIC METABOLIC PNL TOTAL CA: CPT

## 2019-01-08 PROCEDURE — 2500000003 HC RX 250 WO HCPCS: Performed by: INTERNAL MEDICINE

## 2019-01-08 PROCEDURE — 2580000003 HC RX 258: Performed by: INTERNAL MEDICINE

## 2019-01-08 PROCEDURE — 6370000000 HC RX 637 (ALT 250 FOR IP): Performed by: COLON & RECTAL SURGERY

## 2019-01-08 PROCEDURE — 36415 COLL VENOUS BLD VENIPUNCTURE: CPT

## 2019-01-08 PROCEDURE — 1210000000 HC MED SURG R&B

## 2019-01-08 PROCEDURE — 85025 COMPLETE CBC W/AUTO DIFF WBC: CPT

## 2019-01-08 PROCEDURE — 6370000000 HC RX 637 (ALT 250 FOR IP): Performed by: INTERNAL MEDICINE

## 2019-01-08 PROCEDURE — 99211 OFF/OP EST MAY X REQ PHY/QHP: CPT

## 2019-01-08 PROCEDURE — 99024 POSTOP FOLLOW-UP VISIT: CPT | Performed by: COLON & RECTAL SURGERY

## 2019-01-08 PROCEDURE — 2700000000 HC OXYGEN THERAPY PER DAY

## 2019-01-08 PROCEDURE — 76775 US EXAM ABDO BACK WALL LIM: CPT

## 2019-01-08 PROCEDURE — 51702 INSERT TEMP BLADDER CATH: CPT

## 2019-01-08 RX ORDER — SODIUM CHLORIDE 9 MG/ML
INJECTION, SOLUTION INTRAVENOUS CONTINUOUS
Status: DISCONTINUED | OUTPATIENT
Start: 2019-01-08 | End: 2019-01-10

## 2019-01-08 RX ORDER — OXYCODONE HYDROCHLORIDE AND ACETAMINOPHEN 5; 325 MG/1; MG/1
1 TABLET ORAL EVERY 4 HOURS PRN
Status: DISCONTINUED | OUTPATIENT
Start: 2019-01-08 | End: 2019-01-16 | Stop reason: HOSPADM

## 2019-01-08 RX ORDER — OXYCODONE HYDROCHLORIDE AND ACETAMINOPHEN 5; 325 MG/1; MG/1
2 TABLET ORAL EVERY 4 HOURS PRN
Status: DISCONTINUED | OUTPATIENT
Start: 2019-01-08 | End: 2019-01-16 | Stop reason: HOSPADM

## 2019-01-08 RX ADMIN — OXYCODONE AND ACETAMINOPHEN 1 TABLET: 5; 325 TABLET ORAL at 10:34

## 2019-01-08 RX ADMIN — LOSARTAN POTASSIUM 100 MG: 50 TABLET ORAL at 10:35

## 2019-01-08 RX ADMIN — GABAPENTIN 100 MG: 100 CAPSULE ORAL at 20:46

## 2019-01-08 RX ADMIN — LURASIDONE HYDROCHLORIDE 60 MG: 40 TABLET, FILM COATED ORAL at 10:35

## 2019-01-08 RX ADMIN — Medication 10 ML: at 10:39

## 2019-01-08 RX ADMIN — OXYCODONE AND ACETAMINOPHEN 1 TABLET: 5; 325 TABLET ORAL at 04:45

## 2019-01-08 RX ADMIN — AMLODIPINE BESYLATE 5 MG: 5 TABLET ORAL at 10:36

## 2019-01-08 RX ADMIN — Medication 10 ML: at 20:47

## 2019-01-08 RX ADMIN — CLINDAMYCIN IN 5 PERCENT DEXTROSE 600 MG: 12 INJECTION, SOLUTION INTRAVENOUS at 04:24

## 2019-01-08 RX ADMIN — PRAZOSIN HYDROCHLORIDE 1 MG: 1 CAPSULE ORAL at 10:35

## 2019-01-08 RX ADMIN — CLINDAMYCIN IN 5 PERCENT DEXTROSE 600 MG: 12 INJECTION, SOLUTION INTRAVENOUS at 20:46

## 2019-01-08 RX ADMIN — LEVOTHYROXINE SODIUM 50 MCG: 50 TABLET ORAL at 04:54

## 2019-01-08 RX ADMIN — DOCUSATE SODIUM 100 MG: 100 CAPSULE, LIQUID FILLED ORAL at 10:36

## 2019-01-08 RX ADMIN — INSULIN LISPRO 1 UNITS: 100 INJECTION, SOLUTION INTRAVENOUS; SUBCUTANEOUS at 20:57

## 2019-01-08 RX ADMIN — Medication 10 ML: at 13:39

## 2019-01-08 RX ADMIN — TRAZODONE HYDROCHLORIDE 100 MG: 100 TABLET ORAL at 20:45

## 2019-01-08 RX ADMIN — SODIUM CHLORIDE: 9 INJECTION, SOLUTION INTRAVENOUS at 13:38

## 2019-01-08 RX ADMIN — LINAGLIPTIN 5 MG: 5 TABLET, FILM COATED ORAL at 10:36

## 2019-01-08 RX ADMIN — OXYCODONE AND ACETAMINOPHEN 2 TABLET: 5; 325 TABLET ORAL at 20:45

## 2019-01-08 RX ADMIN — VITAMIN D, TAB 1000IU (100/BT) 2000 UNITS: 25 TAB at 10:35

## 2019-01-08 RX ADMIN — PRAZOSIN HYDROCHLORIDE 1 MG: 1 CAPSULE ORAL at 20:46

## 2019-01-08 RX ADMIN — BUPROPION HYDROCHLORIDE 150 MG: 150 TABLET, EXTENDED RELEASE ORAL at 10:35

## 2019-01-08 RX ADMIN — GABAPENTIN 100 MG: 100 CAPSULE ORAL at 10:35

## 2019-01-08 RX ADMIN — TOPIRAMATE 50 MG: 25 TABLET, FILM COATED ORAL at 20:43

## 2019-01-08 RX ADMIN — DULOXETINE HYDROCHLORIDE 30 MG: 30 CAPSULE, DELAYED RELEASE ORAL at 10:34

## 2019-01-08 ASSESSMENT — PAIN SCALES - GENERAL
PAINLEVEL_OUTOF10: 8
PAINLEVEL_OUTOF10: 6
PAINLEVEL_OUTOF10: 3
PAINLEVEL_OUTOF10: 6
PAINLEVEL_OUTOF10: 2
PAINLEVEL_OUTOF10: 8

## 2019-01-08 ASSESSMENT — ENCOUNTER SYMPTOMS
RECTAL PAIN: 1
STRIDOR: 0
ANAL BLEEDING: 0
DIARRHEA: 0
WHEEZING: 0
NAUSEA: 0
SHORTNESS OF BREATH: 0

## 2019-01-08 ASSESSMENT — PAIN DESCRIPTION - PROGRESSION: CLINICAL_PROGRESSION: GRADUALLY IMPROVING

## 2019-01-09 LAB
ANAEROBIC CULTURE: ABNORMAL
ANAEROBIC CULTURE: ABNORMAL
ANION GAP SERPL CALCULATED.3IONS-SCNC: 14 MEQ/L (ref 7–13)
BASOPHILS ABSOLUTE: 0 K/UL (ref 0–0.2)
BASOPHILS RELATIVE PERCENT: 0.3 %
BUN BLDV-MCNC: 44 MG/DL (ref 6–20)
CALCIUM SERPL-MCNC: 8.6 MG/DL (ref 8.6–10.2)
CHLORIDE BLD-SCNC: 95 MEQ/L (ref 98–107)
CO2: 21 MEQ/L (ref 22–29)
CREAT SERPL-MCNC: 4.24 MG/DL (ref 0.5–0.9)
CREATININE URINE: 258.2 MG/DL
CULTURE SURGICAL: ABNORMAL
EOSINOPHIL,URINE: NORMAL
EOSINOPHILS ABSOLUTE: 0.2 K/UL (ref 0–0.7)
EOSINOPHILS RELATIVE PERCENT: 1.3 %
FERRITIN: 493.2 NG/ML (ref 13–150)
GFR AFRICAN AMERICAN: 13.1
GFR NON-AFRICAN AMERICAN: 10.8
GLUCOSE BLD-MCNC: 114 MG/DL (ref 60–115)
GLUCOSE BLD-MCNC: 116 MG/DL (ref 60–115)
GLUCOSE BLD-MCNC: 121 MG/DL (ref 74–109)
GLUCOSE BLD-MCNC: 148 MG/DL (ref 60–115)
GLUCOSE BLD-MCNC: 96 MG/DL (ref 60–115)
GRAM STAIN RESULT: ABNORMAL
HCT VFR BLD CALC: 30.8 % (ref 37–47)
HEMOGLOBIN: 10.3 G/DL (ref 12–16)
IRON SATURATION: 11 % (ref 11–46)
IRON: 18 UG/DL (ref 37–145)
LYMPHOCYTES ABSOLUTE: 0.8 K/UL (ref 1–4.8)
LYMPHOCYTES RELATIVE PERCENT: 6.5 %
MCH RBC QN AUTO: 25.7 PG (ref 27–31.3)
MCHC RBC AUTO-ENTMCNC: 33.5 % (ref 33–37)
MCV RBC AUTO: 76.7 FL (ref 82–100)
MONOCYTES ABSOLUTE: 0.9 K/UL (ref 0.2–0.8)
MONOCYTES RELATIVE PERCENT: 7.5 %
NEUTROPHILS ABSOLUTE: 10.6 K/UL (ref 1.4–6.5)
NEUTROPHILS RELATIVE PERCENT: 84.4 %
ORGANISM: ABNORMAL
ORGANISM: ABNORMAL
PDW BLD-RTO: 17 % (ref 11.5–14.5)
PERFORMED ON: ABNORMAL
PERFORMED ON: ABNORMAL
PERFORMED ON: NORMAL
PERFORMED ON: NORMAL
PLATELET # BLD: 181 K/UL (ref 130–400)
POTASSIUM SERPL-SCNC: 3.6 MEQ/L (ref 3.5–5.1)
RBC # BLD: 4.01 M/UL (ref 4.2–5.4)
SODIUM BLD-SCNC: 130 MEQ/L (ref 132–144)
SODIUM URINE: <20 MEQ/L
TOTAL CK: 143 U/L (ref 0–170)
TOTAL IRON BINDING CAPACITY: 163 UG/DL (ref 178–450)
VANCOMYCIN RANDOM: 32.8 UG/ML (ref 10–40)
WBC # BLD: 12.5 K/UL (ref 4.8–10.8)

## 2019-01-09 PROCEDURE — 6370000000 HC RX 637 (ALT 250 FOR IP): Performed by: INTERNAL MEDICINE

## 2019-01-09 PROCEDURE — 82570 ASSAY OF URINE CREATININE: CPT

## 2019-01-09 PROCEDURE — 6360000002 HC RX W HCPCS: Performed by: INTERNAL MEDICINE

## 2019-01-09 PROCEDURE — G8979 MOBILITY GOAL STATUS: HCPCS

## 2019-01-09 PROCEDURE — 82728 ASSAY OF FERRITIN: CPT

## 2019-01-09 PROCEDURE — P9046 ALBUMIN (HUMAN), 25%, 20 ML: HCPCS | Performed by: INTERNAL MEDICINE

## 2019-01-09 PROCEDURE — G8987 SELF CARE CURRENT STATUS: HCPCS

## 2019-01-09 PROCEDURE — 83550 IRON BINDING TEST: CPT

## 2019-01-09 PROCEDURE — 36415 COLL VENOUS BLD VENIPUNCTURE: CPT

## 2019-01-09 PROCEDURE — 99213 OFFICE O/P EST LOW 20 MIN: CPT

## 2019-01-09 PROCEDURE — 2580000003 HC RX 258: Performed by: INTERNAL MEDICINE

## 2019-01-09 PROCEDURE — G8989 SELF CARE D/C STATUS: HCPCS

## 2019-01-09 PROCEDURE — 1210000000 HC MED SURG R&B

## 2019-01-09 PROCEDURE — 80202 ASSAY OF VANCOMYCIN: CPT

## 2019-01-09 PROCEDURE — 85025 COMPLETE CBC W/AUTO DIFF WBC: CPT

## 2019-01-09 PROCEDURE — 97162 PT EVAL MOD COMPLEX 30 MIN: CPT

## 2019-01-09 PROCEDURE — 82550 ASSAY OF CK (CPK): CPT

## 2019-01-09 PROCEDURE — 97166 OT EVAL MOD COMPLEX 45 MIN: CPT

## 2019-01-09 PROCEDURE — 80048 BASIC METABOLIC PNL TOTAL CA: CPT

## 2019-01-09 PROCEDURE — G8988 SELF CARE GOAL STATUS: HCPCS

## 2019-01-09 PROCEDURE — 87205 SMEAR GRAM STAIN: CPT

## 2019-01-09 PROCEDURE — 6370000000 HC RX 637 (ALT 250 FOR IP): Performed by: COLON & RECTAL SURGERY

## 2019-01-09 PROCEDURE — 84300 ASSAY OF URINE SODIUM: CPT

## 2019-01-09 PROCEDURE — 2500000003 HC RX 250 WO HCPCS: Performed by: INTERNAL MEDICINE

## 2019-01-09 PROCEDURE — 83874 ASSAY OF MYOGLOBIN: CPT

## 2019-01-09 PROCEDURE — G8978 MOBILITY CURRENT STATUS: HCPCS

## 2019-01-09 PROCEDURE — 83540 ASSAY OF IRON: CPT

## 2019-01-09 RX ORDER — MAGNESIUM HYDROXIDE 1200 MG/15ML
50 LIQUID ORAL PRN
Status: DISCONTINUED | OUTPATIENT
Start: 2019-01-09 | End: 2019-01-16 | Stop reason: HOSPADM

## 2019-01-09 RX ORDER — MAGNESIUM HYDROXIDE 1200 MG/15ML
LIQUID ORAL
Status: COMPLETED
Start: 2019-01-09 | End: 2019-01-10

## 2019-01-09 RX ORDER — ALBUMIN (HUMAN) 12.5 G/50ML
25 SOLUTION INTRAVENOUS ONCE
Status: COMPLETED | OUTPATIENT
Start: 2019-01-09 | End: 2019-01-09

## 2019-01-09 RX ORDER — DOCUSATE SODIUM 100 MG/1
100 CAPSULE, LIQUID FILLED ORAL 2 TIMES DAILY
Status: DISCONTINUED | OUTPATIENT
Start: 2019-01-09 | End: 2019-01-16 | Stop reason: HOSPADM

## 2019-01-09 RX ADMIN — ENOXAPARIN SODIUM 30 MG: 30 INJECTION SUBCUTANEOUS at 17:05

## 2019-01-09 RX ADMIN — LINAGLIPTIN 5 MG: 5 TABLET, FILM COATED ORAL at 09:10

## 2019-01-09 RX ADMIN — GABAPENTIN 100 MG: 100 CAPSULE ORAL at 12:22

## 2019-01-09 RX ADMIN — LURASIDONE HYDROCHLORIDE 60 MG: 40 TABLET, FILM COATED ORAL at 09:09

## 2019-01-09 RX ADMIN — ALBUMIN (HUMAN) 25 G: 0.25 INJECTION, SOLUTION INTRAVENOUS at 13:23

## 2019-01-09 RX ADMIN — CLINDAMYCIN IN 5 PERCENT DEXTROSE 600 MG: 12 INJECTION, SOLUTION INTRAVENOUS at 05:37

## 2019-01-09 RX ADMIN — DOCUSATE SODIUM 100 MG: 100 CAPSULE, LIQUID FILLED ORAL at 20:45

## 2019-01-09 RX ADMIN — GABAPENTIN 100 MG: 100 CAPSULE ORAL at 09:12

## 2019-01-09 RX ADMIN — ASPIRIN 81 MG 81 MG: 81 TABLET ORAL at 09:10

## 2019-01-09 RX ADMIN — SODIUM CHLORIDE: 9 INJECTION, SOLUTION INTRAVENOUS at 17:06

## 2019-01-09 RX ADMIN — CLINDAMYCIN IN 5 PERCENT DEXTROSE 600 MG: 12 INJECTION, SOLUTION INTRAVENOUS at 20:44

## 2019-01-09 RX ADMIN — DOCUSATE SODIUM 100 MG: 100 CAPSULE, LIQUID FILLED ORAL at 09:11

## 2019-01-09 RX ADMIN — SODIUM CHLORIDE: 9 INJECTION, SOLUTION INTRAVENOUS at 06:19

## 2019-01-09 RX ADMIN — TOPIRAMATE 50 MG: 25 TABLET, FILM COATED ORAL at 20:44

## 2019-01-09 RX ADMIN — CLINDAMYCIN IN 5 PERCENT DEXTROSE 600 MG: 12 INJECTION, SOLUTION INTRAVENOUS at 12:22

## 2019-01-09 RX ADMIN — OXYCODONE AND ACETAMINOPHEN 1 TABLET: 5; 325 TABLET ORAL at 09:20

## 2019-01-09 RX ADMIN — VANCOMYCIN HYDROCHLORIDE 1500 MG: 5 INJECTION, POWDER, LYOPHILIZED, FOR SOLUTION INTRAVENOUS at 01:17

## 2019-01-09 RX ADMIN — LEVOTHYROXINE SODIUM 50 MCG: 50 TABLET ORAL at 05:37

## 2019-01-09 RX ADMIN — BUPROPION HYDROCHLORIDE 150 MG: 150 TABLET, EXTENDED RELEASE ORAL at 09:10

## 2019-01-09 RX ADMIN — GABAPENTIN 100 MG: 100 CAPSULE ORAL at 20:44

## 2019-01-09 RX ADMIN — DULOXETINE HYDROCHLORIDE 30 MG: 30 CAPSULE, DELAYED RELEASE ORAL at 09:10

## 2019-01-09 RX ADMIN — ACETAMINOPHEN 650 MG: 325 TABLET ORAL at 21:02

## 2019-01-09 RX ADMIN — Medication 10 ML: at 09:10

## 2019-01-09 RX ADMIN — VITAMIN D, TAB 1000IU (100/BT) 2000 UNITS: 25 TAB at 09:09

## 2019-01-09 RX ADMIN — TRAZODONE HYDROCHLORIDE 100 MG: 100 TABLET ORAL at 20:44

## 2019-01-09 ASSESSMENT — ENCOUNTER SYMPTOMS
RECTAL PAIN: 1
STRIDOR: 0
NAUSEA: 0
SHORTNESS OF BREATH: 0
WHEEZING: 0
DIARRHEA: 0
ANAL BLEEDING: 0

## 2019-01-09 ASSESSMENT — PAIN DESCRIPTION - FREQUENCY: FREQUENCY: CONTINUOUS

## 2019-01-09 ASSESSMENT — PAIN SCALES - GENERAL
PAINLEVEL_OUTOF10: 3
PAINLEVEL_OUTOF10: 3
PAINLEVEL_OUTOF10: 4
PAINLEVEL_OUTOF10: 6

## 2019-01-09 ASSESSMENT — PAIN DESCRIPTION - LOCATION
LOCATION: LEG
LOCATION: LEG;BACK

## 2019-01-09 ASSESSMENT — PAIN DESCRIPTION - PAIN TYPE
TYPE: CHRONIC PAIN
TYPE: CHRONIC PAIN

## 2019-01-09 ASSESSMENT — PAIN DESCRIPTION - ORIENTATION
ORIENTATION: RIGHT;LEFT
ORIENTATION: LEFT;RIGHT

## 2019-01-09 ASSESSMENT — PAIN DESCRIPTION - DESCRIPTORS: DESCRIPTORS: SORE;TIGHTNESS

## 2019-01-10 LAB
ALBUMIN SERPL-MCNC: 2.6 G/DL (ref 3.9–4.9)
ALP BLD-CCNC: 181 U/L (ref 40–130)
ALT SERPL-CCNC: 25 U/L (ref 0–33)
ANION GAP SERPL CALCULATED.3IONS-SCNC: 16 MEQ/L (ref 7–13)
ANISOCYTOSIS: ABNORMAL
AST SERPL-CCNC: 19 U/L (ref 0–35)
BANDED NEUTROPHILS RELATIVE PERCENT: 7 % (ref 5–11)
BASOPHILS ABSOLUTE: 0.1 K/UL (ref 0–0.2)
BASOPHILS RELATIVE PERCENT: 1 %
BILIRUB SERPL-MCNC: 0.7 MG/DL (ref 0–1.2)
BUN BLDV-MCNC: 54 MG/DL (ref 6–20)
CALCIUM SERPL-MCNC: 9 MG/DL (ref 8.6–10.2)
CHLORIDE BLD-SCNC: 94 MEQ/L (ref 98–107)
CO2: 19 MEQ/L (ref 22–29)
CREAT SERPL-MCNC: 5.54 MG/DL (ref 0.5–0.9)
CREATININE URINE: 131.1 MG/DL
EOSINOPHIL,URINE: NORMAL
EOSINOPHILS ABSOLUTE: 0.1 K/UL (ref 0–0.7)
EOSINOPHILS RELATIVE PERCENT: 1 %
GFR AFRICAN AMERICAN: 9.6
GFR NON-AFRICAN AMERICAN: 7.9
GLOBULIN: 3.5 G/DL (ref 2.3–3.5)
GLUCOSE BLD-MCNC: 103 MG/DL (ref 60–115)
GLUCOSE BLD-MCNC: 112 MG/DL (ref 60–115)
GLUCOSE BLD-MCNC: 81 MG/DL (ref 60–115)
GLUCOSE BLD-MCNC: 92 MG/DL (ref 60–115)
GLUCOSE BLD-MCNC: 99 MG/DL (ref 74–109)
HCT VFR BLD CALC: 29.7 % (ref 37–47)
HEMOGLOBIN: 9.9 G/DL (ref 12–16)
HYPOCHROMIA: 0
LYMPHOCYTES ABSOLUTE: 0.7 K/UL (ref 1–4.8)
LYMPHOCYTES RELATIVE PERCENT: 5 %
MACROCYTES: 0
MCH RBC QN AUTO: 25.9 PG (ref 27–31.3)
MCHC RBC AUTO-ENTMCNC: 33.2 % (ref 33–37)
MCV RBC AUTO: 77.8 FL (ref 82–100)
MICROALBUMIN UR-MCNC: 44.9 MG/DL
MICROALBUMIN/CREAT UR-RTO: 342.5 MG/G (ref 0–30)
MICROCYTES: 0
MONOCYTES ABSOLUTE: 0.5 K/UL (ref 0.2–0.8)
MONOCYTES RELATIVE PERCENT: 3.8 %
NEUTROPHILS ABSOLUTE: 11.7 K/UL (ref 1.4–6.5)
NEUTROPHILS RELATIVE PERCENT: 83 %
PDW BLD-RTO: 16.9 % (ref 11.5–14.5)
PERFORMED ON: NORMAL
PLATELET # BLD: 183 K/UL (ref 130–400)
PLATELET SLIDE REVIEW: ADEQUATE
POIKILOCYTES: 0
POLYCHROMASIA: 0
POTASSIUM SERPL-SCNC: 4.3 MEQ/L (ref 3.5–5.1)
RBC # BLD: 3.81 M/UL (ref 4.2–5.4)
SMUDGE CELLS: 1.9
SODIUM BLD-SCNC: 129 MEQ/L (ref 132–144)
TOTAL PROTEIN: 6.1 G/DL (ref 6.4–8.1)
VANCOMYCIN TROUGH: 29.8 UG/ML (ref 10–20)
WBC # BLD: 13 K/UL (ref 4.8–10.8)

## 2019-01-10 PROCEDURE — 85025 COMPLETE CBC W/AUTO DIFF WBC: CPT

## 2019-01-10 PROCEDURE — 6370000000 HC RX 637 (ALT 250 FOR IP): Performed by: INTERNAL MEDICINE

## 2019-01-10 PROCEDURE — 80053 COMPREHEN METABOLIC PANEL: CPT

## 2019-01-10 PROCEDURE — 82570 ASSAY OF URINE CREATININE: CPT

## 2019-01-10 PROCEDURE — 2580000003 HC RX 258: Performed by: INTERNAL MEDICINE

## 2019-01-10 PROCEDURE — 2500000003 HC RX 250 WO HCPCS: Performed by: INTERNAL MEDICINE

## 2019-01-10 PROCEDURE — 36415 COLL VENOUS BLD VENIPUNCTURE: CPT

## 2019-01-10 PROCEDURE — 80202 ASSAY OF VANCOMYCIN: CPT

## 2019-01-10 PROCEDURE — 1210000000 HC MED SURG R&B

## 2019-01-10 PROCEDURE — P9046 ALBUMIN (HUMAN), 25%, 20 ML: HCPCS | Performed by: INTERNAL MEDICINE

## 2019-01-10 PROCEDURE — 97116 GAIT TRAINING THERAPY: CPT

## 2019-01-10 PROCEDURE — 83874 ASSAY OF MYOGLOBIN: CPT

## 2019-01-10 PROCEDURE — 87205 SMEAR GRAM STAIN: CPT

## 2019-01-10 PROCEDURE — 2580000003 HC RX 258

## 2019-01-10 PROCEDURE — 82043 UR ALBUMIN QUANTITATIVE: CPT

## 2019-01-10 PROCEDURE — 6360000002 HC RX W HCPCS: Performed by: INTERNAL MEDICINE

## 2019-01-10 RX ORDER — CALCIUM CARBONATE 200(500)MG
1000 TABLET,CHEWABLE ORAL 3 TIMES DAILY PRN
Status: DISCONTINUED | OUTPATIENT
Start: 2019-01-10 | End: 2019-01-16 | Stop reason: HOSPADM

## 2019-01-10 RX ORDER — LACTULOSE 10 G/15ML
20 SOLUTION ORAL DAILY PRN
Status: DISCONTINUED | OUTPATIENT
Start: 2019-01-10 | End: 2019-01-16 | Stop reason: HOSPADM

## 2019-01-10 RX ORDER — ALBUMIN (HUMAN) 12.5 G/50ML
50 SOLUTION INTRAVENOUS ONCE
Status: COMPLETED | OUTPATIENT
Start: 2019-01-10 | End: 2019-01-10

## 2019-01-10 RX ORDER — LACTULOSE 10 G/15ML
20 SOLUTION ORAL DAILY
Status: DISCONTINUED | OUTPATIENT
Start: 2019-01-10 | End: 2019-01-10

## 2019-01-10 RX ORDER — SODIUM PHOSPHATE, DIBASIC AND SODIUM PHOSPHATE, MONOBASIC 7; 19 G/133ML; G/133ML
1 ENEMA RECTAL DAILY
Status: DISCONTINUED | OUTPATIENT
Start: 2019-01-11 | End: 2019-01-16 | Stop reason: HOSPADM

## 2019-01-10 RX ORDER — 0.9 % SODIUM CHLORIDE 0.9 %
500 INTRAVENOUS SOLUTION INTRAVENOUS ONCE
Status: COMPLETED | OUTPATIENT
Start: 2019-01-10 | End: 2019-01-10

## 2019-01-10 RX ADMIN — ACETAMINOPHEN 650 MG: 325 TABLET ORAL at 10:58

## 2019-01-10 RX ADMIN — TOPIRAMATE 50 MG: 25 TABLET, FILM COATED ORAL at 20:55

## 2019-01-10 RX ADMIN — Medication 10 ML: at 21:54

## 2019-01-10 RX ADMIN — GABAPENTIN 100 MG: 100 CAPSULE ORAL at 11:02

## 2019-01-10 RX ADMIN — DOCUSATE SODIUM 100 MG: 100 CAPSULE, LIQUID FILLED ORAL at 11:00

## 2019-01-10 RX ADMIN — GABAPENTIN 100 MG: 100 CAPSULE ORAL at 20:56

## 2019-01-10 RX ADMIN — GABAPENTIN 100 MG: 100 CAPSULE ORAL at 13:29

## 2019-01-10 RX ADMIN — DULOXETINE HYDROCHLORIDE 30 MG: 30 CAPSULE, DELAYED RELEASE ORAL at 11:01

## 2019-01-10 RX ADMIN — CLINDAMYCIN IN 5 PERCENT DEXTROSE 600 MG: 12 INJECTION, SOLUTION INTRAVENOUS at 06:00

## 2019-01-10 RX ADMIN — AMLODIPINE BESYLATE 5 MG: 5 TABLET ORAL at 11:02

## 2019-01-10 RX ADMIN — SODIUM CHLORIDE: 900 IRRIGANT IRRIGATION at 00:27

## 2019-01-10 RX ADMIN — MAGNESIUM HYDROXIDE 30 ML: 400 SUSPENSION ORAL at 21:00

## 2019-01-10 RX ADMIN — LURASIDONE HYDROCHLORIDE 60 MG: 40 TABLET, FILM COATED ORAL at 11:01

## 2019-01-10 RX ADMIN — VITAMIN D, TAB 1000IU (100/BT) 2000 UNITS: 25 TAB at 11:03

## 2019-01-10 RX ADMIN — BUPROPION HYDROCHLORIDE 150 MG: 150 TABLET, EXTENDED RELEASE ORAL at 11:03

## 2019-01-10 RX ADMIN — SODIUM CHLORIDE 500 ML: 9 INJECTION, SOLUTION INTRAVENOUS at 11:04

## 2019-01-10 RX ADMIN — SODIUM CHLORIDE: 9 INJECTION, SOLUTION INTRAVENOUS at 03:07

## 2019-01-10 RX ADMIN — CLINDAMYCIN IN 5 PERCENT DEXTROSE 600 MG: 12 INJECTION, SOLUTION INTRAVENOUS at 17:26

## 2019-01-10 RX ADMIN — ASPIRIN 81 MG 81 MG: 81 TABLET ORAL at 11:02

## 2019-01-10 RX ADMIN — POLYETHYLENE GLYCOL 3350 17 G: 17 POWDER, FOR SOLUTION ORAL at 22:25

## 2019-01-10 RX ADMIN — LEVOTHYROXINE SODIUM 50 MCG: 50 TABLET ORAL at 06:00

## 2019-01-10 RX ADMIN — LINAGLIPTIN 5 MG: 5 TABLET, FILM COATED ORAL at 11:01

## 2019-01-10 RX ADMIN — ALBUMIN (HUMAN) 50 G: 0.25 INJECTION, SOLUTION INTRAVENOUS at 13:12

## 2019-01-10 RX ADMIN — ENOXAPARIN SODIUM 30 MG: 30 INJECTION SUBCUTANEOUS at 16:48

## 2019-01-10 RX ADMIN — LACTULOSE 20 G: 20 SOLUTION ORAL at 22:14

## 2019-01-10 RX ADMIN — Medication 1 ENEMA: at 22:36

## 2019-01-10 RX ADMIN — TRAZODONE HYDROCHLORIDE 100 MG: 100 TABLET ORAL at 20:55

## 2019-01-10 RX ADMIN — ANTACID TABLETS 1000 MG: 500 TABLET, CHEWABLE ORAL at 22:38

## 2019-01-10 ASSESSMENT — ENCOUNTER SYMPTOMS
ANAL BLEEDING: 0
RECTAL PAIN: 1
NAUSEA: 0
STRIDOR: 0
SHORTNESS OF BREATH: 0
DIARRHEA: 0
WHEEZING: 0

## 2019-01-10 ASSESSMENT — PAIN SCALES - GENERAL
PAINLEVEL_OUTOF10: 10
PAINLEVEL_OUTOF10: 5
PAINLEVEL_OUTOF10: 4

## 2019-01-10 ASSESSMENT — PAIN DESCRIPTION - DESCRIPTORS: DESCRIPTORS: ACHING;SORE;TIGHTNESS

## 2019-01-10 ASSESSMENT — PAIN DESCRIPTION - LOCATION: LOCATION: BACK;LEG

## 2019-01-10 ASSESSMENT — PAIN DESCRIPTION - PAIN TYPE: TYPE: CHRONIC PAIN

## 2019-01-10 ASSESSMENT — PAIN DESCRIPTION - ORIENTATION: ORIENTATION: RIGHT;LEFT

## 2019-01-11 ENCOUNTER — APPOINTMENT (OUTPATIENT)
Dept: GENERAL RADIOLOGY | Age: 59
DRG: 710 | End: 2019-01-11
Payer: MEDICAID

## 2019-01-11 LAB
ANION GAP SERPL CALCULATED.3IONS-SCNC: 20 MEQ/L (ref 7–13)
BASOPHILS ABSOLUTE: 0 K/UL (ref 0–0.2)
BASOPHILS RELATIVE PERCENT: 0.2 %
BLOOD CULTURE, ROUTINE: NORMAL
BUN BLDV-MCNC: 68 MG/DL (ref 6–20)
CALCIUM SERPL-MCNC: 9.1 MG/DL (ref 8.6–10.2)
CHLORIDE BLD-SCNC: 95 MEQ/L (ref 98–107)
CO2: 16 MEQ/L (ref 22–29)
CREAT SERPL-MCNC: 5.82 MG/DL (ref 0.5–0.9)
EKG ATRIAL RATE: 102 BPM
EKG ATRIAL RATE: 79 BPM
EKG P AXIS: 17 DEGREES
EKG P-R INTERVAL: 158 MS
EKG P-R INTERVAL: 216 MS
EKG Q-T INTERVAL: 342 MS
EKG Q-T INTERVAL: 386 MS
EKG QRS DURATION: 106 MS
EKG QRS DURATION: 80 MS
EKG QTC CALCULATION (BAZETT): 442 MS
EKG QTC CALCULATION (BAZETT): 445 MS
EKG R AXIS: 46 DEGREES
EKG R AXIS: 56 DEGREES
EKG T AXIS: -13 DEGREES
EKG T AXIS: 6 DEGREES
EKG VENTRICULAR RATE: 102 BPM
EKG VENTRICULAR RATE: 79 BPM
EOSINOPHILS ABSOLUTE: 0 K/UL (ref 0–0.7)
EOSINOPHILS RELATIVE PERCENT: 0.2 %
GFR AFRICAN AMERICAN: 9.1
GFR NON-AFRICAN AMERICAN: 7.5
GLUCOSE BLD-MCNC: 102 MG/DL (ref 60–115)
GLUCOSE BLD-MCNC: 123 MG/DL (ref 60–115)
GLUCOSE BLD-MCNC: 90 MG/DL (ref 60–115)
GLUCOSE BLD-MCNC: 91 MG/DL (ref 74–109)
HCT VFR BLD CALC: 30.8 % (ref 37–47)
HEMOGLOBIN: 10.5 G/DL (ref 12–16)
LYMPHOCYTES ABSOLUTE: 0.7 K/UL (ref 1–4.8)
LYMPHOCYTES RELATIVE PERCENT: 3.8 %
MCH RBC QN AUTO: 26.5 PG (ref 27–31.3)
MCHC RBC AUTO-ENTMCNC: 34 % (ref 33–37)
MCV RBC AUTO: 77.8 FL (ref 82–100)
MONOCYTES ABSOLUTE: 1.4 K/UL (ref 0.2–0.8)
MONOCYTES RELATIVE PERCENT: 7.7 %
NEUTROPHILS ABSOLUTE: 15.8 K/UL (ref 1.4–6.5)
NEUTROPHILS RELATIVE PERCENT: 88.1 %
PDW BLD-RTO: 17.7 % (ref 11.5–14.5)
PERFORMED ON: ABNORMAL
PERFORMED ON: NORMAL
PERFORMED ON: NORMAL
PLATELET # BLD: 208 K/UL (ref 130–400)
POTASSIUM SERPL-SCNC: 4.3 MEQ/L (ref 3.5–5.1)
RBC # BLD: 3.95 M/UL (ref 4.2–5.4)
SODIUM BLD-SCNC: 131 MEQ/L (ref 132–144)
WBC # BLD: 17.9 K/UL (ref 4.8–10.8)

## 2019-01-11 PROCEDURE — 2580000003 HC RX 258: Performed by: INTERNAL MEDICINE

## 2019-01-11 PROCEDURE — 2500000003 HC RX 250 WO HCPCS: Performed by: INTERNAL MEDICINE

## 2019-01-11 PROCEDURE — 85025 COMPLETE CBC W/AUTO DIFF WBC: CPT

## 2019-01-11 PROCEDURE — 6370000000 HC RX 637 (ALT 250 FOR IP): Performed by: INTERNAL MEDICINE

## 2019-01-11 PROCEDURE — 97116 GAIT TRAINING THERAPY: CPT

## 2019-01-11 PROCEDURE — 93005 ELECTROCARDIOGRAM TRACING: CPT

## 2019-01-11 PROCEDURE — 36415 COLL VENOUS BLD VENIPUNCTURE: CPT

## 2019-01-11 PROCEDURE — 71045 X-RAY EXAM CHEST 1 VIEW: CPT

## 2019-01-11 PROCEDURE — 6360000002 HC RX W HCPCS: Performed by: INTERNAL MEDICINE

## 2019-01-11 PROCEDURE — 97535 SELF CARE MNGMENT TRAINING: CPT

## 2019-01-11 PROCEDURE — 80048 BASIC METABOLIC PNL TOTAL CA: CPT

## 2019-01-11 PROCEDURE — 94640 AIRWAY INHALATION TREATMENT: CPT

## 2019-01-11 PROCEDURE — 93010 ELECTROCARDIOGRAM REPORT: CPT | Performed by: INTERNAL MEDICINE

## 2019-01-11 PROCEDURE — 1210000000 HC MED SURG R&B

## 2019-01-11 RX ORDER — BUMETANIDE 0.25 MG/ML
2 INJECTION, SOLUTION INTRAMUSCULAR; INTRAVENOUS ONCE
Status: COMPLETED | OUTPATIENT
Start: 2019-01-11 | End: 2019-01-11

## 2019-01-11 RX ADMIN — ASPIRIN 81 MG 81 MG: 81 TABLET ORAL at 10:33

## 2019-01-11 RX ADMIN — AMLODIPINE BESYLATE 5 MG: 5 TABLET ORAL at 10:32

## 2019-01-11 RX ADMIN — DULOXETINE HYDROCHLORIDE 30 MG: 30 CAPSULE, DELAYED RELEASE ORAL at 10:32

## 2019-01-11 RX ADMIN — LINAGLIPTIN 5 MG: 5 TABLET, FILM COATED ORAL at 10:33

## 2019-01-11 RX ADMIN — BUPROPION HYDROCHLORIDE 150 MG: 150 TABLET, EXTENDED RELEASE ORAL at 10:32

## 2019-01-11 RX ADMIN — CLINDAMYCIN IN 5 PERCENT DEXTROSE 600 MG: 12 INJECTION, SOLUTION INTRAVENOUS at 18:15

## 2019-01-11 RX ADMIN — GABAPENTIN 100 MG: 100 CAPSULE ORAL at 10:32

## 2019-01-11 RX ADMIN — TOPIRAMATE 50 MG: 25 TABLET, FILM COATED ORAL at 23:36

## 2019-01-11 RX ADMIN — ENOXAPARIN SODIUM 30 MG: 30 INJECTION SUBCUTANEOUS at 18:22

## 2019-01-11 RX ADMIN — GABAPENTIN 100 MG: 100 CAPSULE ORAL at 23:36

## 2019-01-11 RX ADMIN — Medication 10 ML: at 23:35

## 2019-01-11 RX ADMIN — LEVOTHYROXINE SODIUM 50 MCG: 50 TABLET ORAL at 05:51

## 2019-01-11 RX ADMIN — OXYCODONE AND ACETAMINOPHEN 1 TABLET: 5; 325 TABLET ORAL at 10:35

## 2019-01-11 RX ADMIN — CLINDAMYCIN IN 5 PERCENT DEXTROSE 600 MG: 12 INJECTION, SOLUTION INTRAVENOUS at 10:37

## 2019-01-11 RX ADMIN — POLYETHYLENE GLYCOL 3350 17 G: 17 POWDER, FOR SOLUTION ORAL at 23:35

## 2019-01-11 RX ADMIN — CLINDAMYCIN IN 5 PERCENT DEXTROSE 600 MG: 12 INJECTION, SOLUTION INTRAVENOUS at 02:26

## 2019-01-11 RX ADMIN — Medication 10 ML: at 10:34

## 2019-01-11 RX ADMIN — GABAPENTIN 100 MG: 100 CAPSULE ORAL at 14:29

## 2019-01-11 RX ADMIN — Medication 2 PUFF: at 18:32

## 2019-01-11 RX ADMIN — BUMETANIDE 2 MG: 0.25 INJECTION INTRAMUSCULAR; INTRAVENOUS at 12:51

## 2019-01-11 RX ADMIN — DOCUSATE SODIUM 100 MG: 100 CAPSULE, LIQUID FILLED ORAL at 23:36

## 2019-01-11 RX ADMIN — TRAZODONE HYDROCHLORIDE 100 MG: 100 TABLET ORAL at 23:36

## 2019-01-11 RX ADMIN — LACTULOSE 20 G: 20 SOLUTION ORAL at 23:35

## 2019-01-11 RX ADMIN — VITAMIN D, TAB 1000IU (100/BT) 2000 UNITS: 25 TAB at 23:36

## 2019-01-11 RX ADMIN — DOCUSATE SODIUM 100 MG: 100 CAPSULE, LIQUID FILLED ORAL at 10:33

## 2019-01-11 RX ADMIN — LURASIDONE HYDROCHLORIDE 60 MG: 40 TABLET, FILM COATED ORAL at 10:31

## 2019-01-11 RX ADMIN — OXYCODONE AND ACETAMINOPHEN 1 TABLET: 5; 325 TABLET ORAL at 02:25

## 2019-01-11 ASSESSMENT — ENCOUNTER SYMPTOMS
SHORTNESS OF BREATH: 0
STRIDOR: 0
NAUSEA: 0
RECTAL PAIN: 1
WHEEZING: 0
DIARRHEA: 0
ANAL BLEEDING: 0

## 2019-01-11 ASSESSMENT — PAIN DESCRIPTION - PAIN TYPE: TYPE: CHRONIC PAIN

## 2019-01-11 ASSESSMENT — PAIN SCALES - GENERAL
PAINLEVEL_OUTOF10: 0
PAINLEVEL_OUTOF10: 9
PAINLEVEL_OUTOF10: 5

## 2019-01-12 LAB
ALBUMIN SERPL-MCNC: 2.9 G/DL (ref 3.9–4.9)
ANION GAP SERPL CALCULATED.3IONS-SCNC: 20 MEQ/L (ref 7–13)
BASOPHILS ABSOLUTE: 0.1 K/UL (ref 0–0.2)
BASOPHILS RELATIVE PERCENT: 0.4 %
BLOOD CULTURE, ROUTINE: NORMAL
BUN BLDV-MCNC: 69 MG/DL (ref 6–20)
CALCIUM SERPL-MCNC: 8.9 MG/DL (ref 8.6–10.2)
CHLORIDE BLD-SCNC: 96 MEQ/L (ref 98–107)
CO2: 16 MEQ/L (ref 22–29)
CREAT SERPL-MCNC: 5.15 MG/DL (ref 0.5–0.9)
CULTURE, BLOOD 2: NORMAL
EOSINOPHILS ABSOLUTE: 0.1 K/UL (ref 0–0.7)
EOSINOPHILS RELATIVE PERCENT: 0.4 %
GFR AFRICAN AMERICAN: 10.4
GFR NON-AFRICAN AMERICAN: 8.6
GLUCOSE BLD-MCNC: 105 MG/DL (ref 60–115)
GLUCOSE BLD-MCNC: 113 MG/DL (ref 60–115)
GLUCOSE BLD-MCNC: 128 MG/DL (ref 60–115)
GLUCOSE BLD-MCNC: 138 MG/DL (ref 74–109)
GLUCOSE BLD-MCNC: 150 MG/DL (ref 60–115)
GLUCOSE BLD-MCNC: 98 MG/DL (ref 60–115)
HCT VFR BLD CALC: 32.4 % (ref 37–47)
HEMOGLOBIN: 10.9 G/DL (ref 12–16)
LYMPHOCYTES ABSOLUTE: 0.7 K/UL (ref 1–4.8)
LYMPHOCYTES RELATIVE PERCENT: 4.4 %
MCH RBC QN AUTO: 26.1 PG (ref 27–31.3)
MCHC RBC AUTO-ENTMCNC: 33.7 % (ref 33–37)
MCV RBC AUTO: 77.5 FL (ref 82–100)
MONOCYTES ABSOLUTE: 1.3 K/UL (ref 0.2–0.8)
MONOCYTES RELATIVE PERCENT: 7.9 %
MYOGLOBIN URINE: <1 MG/L (ref 0–1)
NEUTROPHILS ABSOLUTE: 14.1 K/UL (ref 1.4–6.5)
NEUTROPHILS RELATIVE PERCENT: 86.9 %
PDW BLD-RTO: 18.3 % (ref 11.5–14.5)
PERFORMED ON: ABNORMAL
PERFORMED ON: ABNORMAL
PERFORMED ON: NORMAL
PLATELET # BLD: 233 K/UL (ref 130–400)
POTASSIUM SERPL-SCNC: 3.9 MEQ/L (ref 3.5–5.1)
RBC # BLD: 4.17 M/UL (ref 4.2–5.4)
SODIUM BLD-SCNC: 132 MEQ/L (ref 132–144)
WBC # BLD: 16.3 K/UL (ref 4.8–10.8)

## 2019-01-12 PROCEDURE — 36415 COLL VENOUS BLD VENIPUNCTURE: CPT

## 2019-01-12 PROCEDURE — 6370000000 HC RX 637 (ALT 250 FOR IP): Performed by: INTERNAL MEDICINE

## 2019-01-12 PROCEDURE — 2580000003 HC RX 258: Performed by: INTERNAL MEDICINE

## 2019-01-12 PROCEDURE — 80048 BASIC METABOLIC PNL TOTAL CA: CPT

## 2019-01-12 PROCEDURE — 6360000002 HC RX W HCPCS: Performed by: INTERNAL MEDICINE

## 2019-01-12 PROCEDURE — 2500000003 HC RX 250 WO HCPCS: Performed by: INTERNAL MEDICINE

## 2019-01-12 PROCEDURE — 1210000000 HC MED SURG R&B

## 2019-01-12 PROCEDURE — 82040 ASSAY OF SERUM ALBUMIN: CPT

## 2019-01-12 PROCEDURE — 94640 AIRWAY INHALATION TREATMENT: CPT

## 2019-01-12 PROCEDURE — 2700000000 HC OXYGEN THERAPY PER DAY

## 2019-01-12 PROCEDURE — 85025 COMPLETE CBC W/AUTO DIFF WBC: CPT

## 2019-01-12 RX ADMIN — Medication 10 ML: at 09:39

## 2019-01-12 RX ADMIN — BUPROPION HYDROCHLORIDE 150 MG: 150 TABLET, EXTENDED RELEASE ORAL at 09:37

## 2019-01-12 RX ADMIN — GABAPENTIN 100 MG: 100 CAPSULE ORAL at 09:37

## 2019-01-12 RX ADMIN — CLINDAMYCIN IN 5 PERCENT DEXTROSE 600 MG: 12 INJECTION, SOLUTION INTRAVENOUS at 02:17

## 2019-01-12 RX ADMIN — VITAMIN D, TAB 1000IU (100/BT) 2000 UNITS: 25 TAB at 09:36

## 2019-01-12 RX ADMIN — DOCUSATE SODIUM 100 MG: 100 CAPSULE, LIQUID FILLED ORAL at 20:19

## 2019-01-12 RX ADMIN — AMLODIPINE BESYLATE 5 MG: 5 TABLET ORAL at 09:37

## 2019-01-12 RX ADMIN — Medication 2 PUFF: at 02:11

## 2019-01-12 RX ADMIN — LINAGLIPTIN 5 MG: 5 TABLET, FILM COATED ORAL at 09:37

## 2019-01-12 RX ADMIN — Medication 1 ENEMA: at 00:25

## 2019-01-12 RX ADMIN — CLINDAMYCIN IN 5 PERCENT DEXTROSE 600 MG: 12 INJECTION, SOLUTION INTRAVENOUS at 09:39

## 2019-01-12 RX ADMIN — TOPIRAMATE 50 MG: 25 TABLET, FILM COATED ORAL at 20:19

## 2019-01-12 RX ADMIN — ALBUTEROL SULFATE 2.5 MG: 2.5 SOLUTION RESPIRATORY (INHALATION) at 18:04

## 2019-01-12 RX ADMIN — CLINDAMYCIN IN 5 PERCENT DEXTROSE 600 MG: 12 INJECTION, SOLUTION INTRAVENOUS at 17:04

## 2019-01-12 RX ADMIN — LEVOTHYROXINE SODIUM 50 MCG: 50 TABLET ORAL at 05:45

## 2019-01-12 RX ADMIN — TRAZODONE HYDROCHLORIDE 100 MG: 100 TABLET ORAL at 20:19

## 2019-01-12 RX ADMIN — DOCUSATE SODIUM 100 MG: 100 CAPSULE, LIQUID FILLED ORAL at 09:37

## 2019-01-12 RX ADMIN — LURASIDONE HYDROCHLORIDE 60 MG: 40 TABLET, FILM COATED ORAL at 09:37

## 2019-01-12 RX ADMIN — GABAPENTIN 100 MG: 100 CAPSULE ORAL at 14:44

## 2019-01-12 RX ADMIN — Medication 10 ML: at 20:19

## 2019-01-12 RX ADMIN — GABAPENTIN 100 MG: 100 CAPSULE ORAL at 20:19

## 2019-01-12 RX ADMIN — DULOXETINE HYDROCHLORIDE 30 MG: 30 CAPSULE, DELAYED RELEASE ORAL at 09:37

## 2019-01-12 RX ADMIN — ASPIRIN 81 MG 81 MG: 81 TABLET ORAL at 09:37

## 2019-01-12 ASSESSMENT — ENCOUNTER SYMPTOMS
WHEEZING: 0
STRIDOR: 0
SHORTNESS OF BREATH: 0
DIARRHEA: 0
ANAL BLEEDING: 0
NAUSEA: 0
RECTAL PAIN: 1

## 2019-01-12 ASSESSMENT — PAIN SCALES - GENERAL: PAINLEVEL_OUTOF10: 0

## 2019-01-13 LAB
ANION GAP SERPL CALCULATED.3IONS-SCNC: 21 MEQ/L (ref 7–13)
BANDED NEUTROPHILS RELATIVE PERCENT: 21 % (ref 5–11)
BASOPHILS ABSOLUTE: 0 K/UL (ref 0–0.2)
BASOPHILS RELATIVE PERCENT: 0 %
BUN BLDV-MCNC: 81 MG/DL (ref 6–20)
CALCIUM SERPL-MCNC: 8.7 MG/DL (ref 8.6–10.2)
CHLORIDE BLD-SCNC: 97 MEQ/L (ref 98–107)
CO2: 15 MEQ/L (ref 22–29)
CREAT SERPL-MCNC: 5.31 MG/DL (ref 0.5–0.9)
EOSINOPHILS ABSOLUTE: 0.3 K/UL (ref 0–0.7)
EOSINOPHILS RELATIVE PERCENT: 2 %
GFR AFRICAN AMERICAN: 10.1
GFR NON-AFRICAN AMERICAN: 8.3
GLUCOSE BLD-MCNC: 114 MG/DL (ref 60–115)
GLUCOSE BLD-MCNC: 118 MG/DL (ref 60–115)
GLUCOSE BLD-MCNC: 118 MG/DL (ref 74–109)
GLUCOSE BLD-MCNC: 127 MG/DL (ref 60–115)
GLUCOSE BLD-MCNC: 134 MG/DL (ref 60–115)
HCT VFR BLD CALC: 31.2 % (ref 37–47)
HEMOGLOBIN: 10.5 G/DL (ref 12–16)
LYMPHOCYTES ABSOLUTE: 0.7 K/UL (ref 1–4.8)
LYMPHOCYTES RELATIVE PERCENT: 5 %
MCH RBC QN AUTO: 25.8 PG (ref 27–31.3)
MCHC RBC AUTO-ENTMCNC: 33.5 % (ref 33–37)
MCV RBC AUTO: 77 FL (ref 82–100)
MONOCYTES ABSOLUTE: 1.1 K/UL (ref 0.2–0.8)
MONOCYTES RELATIVE PERCENT: 8 %
MYOGLOBIN URINE: <1 MG/L (ref 0–1)
NEUTROPHILS ABSOLUTE: 11.9 K/UL (ref 1.4–6.5)
NEUTROPHILS RELATIVE PERCENT: 64 %
PDW BLD-RTO: 17.7 % (ref 11.5–14.5)
PERFORMED ON: ABNORMAL
PERFORMED ON: NORMAL
PLATELET # BLD: 231 K/UL (ref 130–400)
PLATELET SLIDE REVIEW: ADEQUATE
POTASSIUM SERPL-SCNC: 4.1 MEQ/L (ref 3.5–5.1)
RBC # BLD: 4.05 M/UL (ref 4.2–5.4)
SODIUM BLD-SCNC: 133 MEQ/L (ref 132–144)
WBC # BLD: 14 K/UL (ref 4.8–10.8)

## 2019-01-13 PROCEDURE — 94640 AIRWAY INHALATION TREATMENT: CPT

## 2019-01-13 PROCEDURE — 85025 COMPLETE CBC W/AUTO DIFF WBC: CPT

## 2019-01-13 PROCEDURE — 6370000000 HC RX 637 (ALT 250 FOR IP): Performed by: INTERNAL MEDICINE

## 2019-01-13 PROCEDURE — 1210000000 HC MED SURG R&B

## 2019-01-13 PROCEDURE — 2500000003 HC RX 250 WO HCPCS: Performed by: INTERNAL MEDICINE

## 2019-01-13 PROCEDURE — 2580000003 HC RX 258: Performed by: INTERNAL MEDICINE

## 2019-01-13 PROCEDURE — 36415 COLL VENOUS BLD VENIPUNCTURE: CPT

## 2019-01-13 PROCEDURE — 6360000002 HC RX W HCPCS: Performed by: INTERNAL MEDICINE

## 2019-01-13 PROCEDURE — 80048 BASIC METABOLIC PNL TOTAL CA: CPT

## 2019-01-13 PROCEDURE — 2700000000 HC OXYGEN THERAPY PER DAY

## 2019-01-13 RX ADMIN — TRAZODONE HYDROCHLORIDE 100 MG: 100 TABLET ORAL at 23:13

## 2019-01-13 RX ADMIN — DOCUSATE SODIUM 100 MG: 100 CAPSULE, LIQUID FILLED ORAL at 10:09

## 2019-01-13 RX ADMIN — AMLODIPINE BESYLATE 5 MG: 5 TABLET ORAL at 10:09

## 2019-01-13 RX ADMIN — LEVOTHYROXINE SODIUM 50 MCG: 50 TABLET ORAL at 05:12

## 2019-01-13 RX ADMIN — VITAMIN D, TAB 1000IU (100/BT) 2000 UNITS: 25 TAB at 10:10

## 2019-01-13 RX ADMIN — Medication 10 ML: at 10:18

## 2019-01-13 RX ADMIN — CLINDAMYCIN IN 5 PERCENT DEXTROSE 600 MG: 12 INJECTION, SOLUTION INTRAVENOUS at 11:45

## 2019-01-13 RX ADMIN — GABAPENTIN 100 MG: 100 CAPSULE ORAL at 23:13

## 2019-01-13 RX ADMIN — DOCUSATE SODIUM 100 MG: 100 CAPSULE, LIQUID FILLED ORAL at 23:13

## 2019-01-13 RX ADMIN — ACETAMINOPHEN 650 MG: 325 TABLET ORAL at 23:13

## 2019-01-13 RX ADMIN — TOPIRAMATE 50 MG: 25 TABLET, FILM COATED ORAL at 23:13

## 2019-01-13 RX ADMIN — BUPROPION HYDROCHLORIDE 150 MG: 150 TABLET, EXTENDED RELEASE ORAL at 10:10

## 2019-01-13 RX ADMIN — ENOXAPARIN SODIUM 30 MG: 30 INJECTION SUBCUTANEOUS at 17:31

## 2019-01-13 RX ADMIN — ASPIRIN 81 MG 81 MG: 81 TABLET ORAL at 10:18

## 2019-01-13 RX ADMIN — LINAGLIPTIN 5 MG: 5 TABLET, FILM COATED ORAL at 10:09

## 2019-01-13 RX ADMIN — ALBUTEROL SULFATE 2.5 MG: 2.5 SOLUTION RESPIRATORY (INHALATION) at 05:44

## 2019-01-13 RX ADMIN — CLINDAMYCIN IN 5 PERCENT DEXTROSE 600 MG: 12 INJECTION, SOLUTION INTRAVENOUS at 01:45

## 2019-01-13 RX ADMIN — GABAPENTIN 100 MG: 100 CAPSULE ORAL at 13:33

## 2019-01-13 RX ADMIN — LURASIDONE HYDROCHLORIDE 60 MG: 40 TABLET, FILM COATED ORAL at 10:09

## 2019-01-13 RX ADMIN — DULOXETINE HYDROCHLORIDE 30 MG: 30 CAPSULE, DELAYED RELEASE ORAL at 10:10

## 2019-01-13 RX ADMIN — GABAPENTIN 100 MG: 100 CAPSULE ORAL at 10:10

## 2019-01-13 RX ADMIN — Medication 10 ML: at 23:16

## 2019-01-13 ASSESSMENT — ENCOUNTER SYMPTOMS
ANAL BLEEDING: 0
WHEEZING: 0
SHORTNESS OF BREATH: 0
NAUSEA: 0
STRIDOR: 0
DIARRHEA: 0
RECTAL PAIN: 1

## 2019-01-13 ASSESSMENT — PAIN SCALES - GENERAL
PAINLEVEL_OUTOF10: 3
PAINLEVEL_OUTOF10: 2
PAINLEVEL_OUTOF10: 0

## 2019-01-13 ASSESSMENT — PAIN DESCRIPTION - PAIN TYPE: TYPE: CHRONIC PAIN

## 2019-01-13 ASSESSMENT — PAIN DESCRIPTION - LOCATION: LOCATION: BUTTOCKS

## 2019-01-13 ASSESSMENT — PAIN DESCRIPTION - ORIENTATION: ORIENTATION: MID

## 2019-01-14 LAB
ANION GAP SERPL CALCULATED.3IONS-SCNC: 20 MEQ/L (ref 7–13)
BASOPHILS ABSOLUTE: 0 K/UL (ref 0–0.2)
BASOPHILS RELATIVE PERCENT: 0.3 %
BUN BLDV-MCNC: 83 MG/DL (ref 6–20)
CALCIUM SERPL-MCNC: 8.9 MG/DL (ref 8.6–10.2)
CHLORIDE BLD-SCNC: 103 MEQ/L (ref 98–107)
CO2: 17 MEQ/L (ref 22–29)
CREAT SERPL-MCNC: 4.67 MG/DL (ref 0.5–0.9)
EOSINOPHILS ABSOLUTE: 0.2 K/UL (ref 0–0.7)
EOSINOPHILS RELATIVE PERCENT: 1.9 %
GFR AFRICAN AMERICAN: 11.7
GFR NON-AFRICAN AMERICAN: 9.6
GLUCOSE BLD-MCNC: 102 MG/DL (ref 60–115)
GLUCOSE BLD-MCNC: 104 MG/DL (ref 74–109)
GLUCOSE BLD-MCNC: 107 MG/DL (ref 60–115)
GLUCOSE BLD-MCNC: 129 MG/DL (ref 60–115)
GLUCOSE BLD-MCNC: 95 MG/DL (ref 60–115)
HCT VFR BLD CALC: 29.8 % (ref 37–47)
HEMOGLOBIN: 10.2 G/DL (ref 12–16)
LYMPHOCYTES ABSOLUTE: 1.2 K/UL (ref 1–4.8)
LYMPHOCYTES RELATIVE PERCENT: 10.3 %
MCH RBC QN AUTO: 26 PG (ref 27–31.3)
MCHC RBC AUTO-ENTMCNC: 34.3 % (ref 33–37)
MCV RBC AUTO: 75.9 FL (ref 82–100)
MONOCYTES ABSOLUTE: 0.9 K/UL (ref 0.2–0.8)
MONOCYTES RELATIVE PERCENT: 7.9 %
NEUTROPHILS ABSOLUTE: 9 K/UL (ref 1.4–6.5)
NEUTROPHILS RELATIVE PERCENT: 79.6 %
PDW BLD-RTO: 18 % (ref 11.5–14.5)
PERFORMED ON: ABNORMAL
PERFORMED ON: NORMAL
PLATELET # BLD: 257 K/UL (ref 130–400)
POTASSIUM SERPL-SCNC: 4.2 MEQ/L (ref 3.5–5.1)
RBC # BLD: 3.93 M/UL (ref 4.2–5.4)
SODIUM BLD-SCNC: 140 MEQ/L (ref 132–144)
WBC # BLD: 11.3 K/UL (ref 4.8–10.8)

## 2019-01-14 PROCEDURE — 6370000000 HC RX 637 (ALT 250 FOR IP): Performed by: INTERNAL MEDICINE

## 2019-01-14 PROCEDURE — 36415 COLL VENOUS BLD VENIPUNCTURE: CPT

## 2019-01-14 PROCEDURE — 85025 COMPLETE CBC W/AUTO DIFF WBC: CPT

## 2019-01-14 PROCEDURE — 80048 BASIC METABOLIC PNL TOTAL CA: CPT

## 2019-01-14 PROCEDURE — 2500000003 HC RX 250 WO HCPCS: Performed by: INTERNAL MEDICINE

## 2019-01-14 PROCEDURE — 97116 GAIT TRAINING THERAPY: CPT

## 2019-01-14 PROCEDURE — 1210000000 HC MED SURG R&B

## 2019-01-14 PROCEDURE — 6360000002 HC RX W HCPCS: Performed by: INTERNAL MEDICINE

## 2019-01-14 PROCEDURE — 97535 SELF CARE MNGMENT TRAINING: CPT

## 2019-01-14 PROCEDURE — 2580000003 HC RX 258: Performed by: INTERNAL MEDICINE

## 2019-01-14 RX ORDER — CLINDAMYCIN HYDROCHLORIDE 300 MG/1
300 CAPSULE ORAL EVERY 8 HOURS SCHEDULED
Status: DISCONTINUED | OUTPATIENT
Start: 2019-01-14 | End: 2019-01-16 | Stop reason: HOSPADM

## 2019-01-14 RX ADMIN — LINAGLIPTIN 5 MG: 5 TABLET, FILM COATED ORAL at 08:11

## 2019-01-14 RX ADMIN — ENOXAPARIN SODIUM 30 MG: 30 INJECTION SUBCUTANEOUS at 17:27

## 2019-01-14 RX ADMIN — TRAZODONE HYDROCHLORIDE 100 MG: 100 TABLET ORAL at 22:00

## 2019-01-14 RX ADMIN — TOPIRAMATE 50 MG: 25 TABLET, FILM COATED ORAL at 22:00

## 2019-01-14 RX ADMIN — LEVOTHYROXINE SODIUM 50 MCG: 50 TABLET ORAL at 06:34

## 2019-01-14 RX ADMIN — Medication 10 ML: at 22:01

## 2019-01-14 RX ADMIN — CLINDAMYCIN IN 5 PERCENT DEXTROSE 600 MG: 12 INJECTION, SOLUTION INTRAVENOUS at 06:34

## 2019-01-14 RX ADMIN — LURASIDONE HYDROCHLORIDE 60 MG: 40 TABLET, FILM COATED ORAL at 08:09

## 2019-01-14 RX ADMIN — GABAPENTIN 100 MG: 100 CAPSULE ORAL at 08:09

## 2019-01-14 RX ADMIN — OXYCODONE AND ACETAMINOPHEN 1 TABLET: 5; 325 TABLET ORAL at 06:53

## 2019-01-14 RX ADMIN — AMLODIPINE BESYLATE 5 MG: 5 TABLET ORAL at 08:10

## 2019-01-14 RX ADMIN — ASPIRIN 81 MG 81 MG: 81 TABLET ORAL at 08:09

## 2019-01-14 RX ADMIN — VITAMIN D, TAB 1000IU (100/BT) 2000 UNITS: 25 TAB at 08:09

## 2019-01-14 RX ADMIN — CLINDAMYCIN IN 5 PERCENT DEXTROSE 600 MG: 12 INJECTION, SOLUTION INTRAVENOUS at 12:08

## 2019-01-14 RX ADMIN — Medication 10 ML: at 08:09

## 2019-01-14 RX ADMIN — DOCUSATE SODIUM 100 MG: 100 CAPSULE, LIQUID FILLED ORAL at 22:00

## 2019-01-14 RX ADMIN — DULOXETINE HYDROCHLORIDE 30 MG: 30 CAPSULE, DELAYED RELEASE ORAL at 08:09

## 2019-01-14 RX ADMIN — GABAPENTIN 100 MG: 100 CAPSULE ORAL at 22:00

## 2019-01-14 RX ADMIN — CLINDAMYCIN HYDROCHLORIDE 300 MG: 300 CAPSULE ORAL at 22:37

## 2019-01-14 RX ADMIN — GABAPENTIN 100 MG: 100 CAPSULE ORAL at 14:43

## 2019-01-14 RX ADMIN — BUPROPION HYDROCHLORIDE 150 MG: 150 TABLET, EXTENDED RELEASE ORAL at 08:09

## 2019-01-14 RX ADMIN — DOCUSATE SODIUM 100 MG: 100 CAPSULE, LIQUID FILLED ORAL at 08:09

## 2019-01-14 ASSESSMENT — PAIN SCALES - GENERAL
PAINLEVEL_OUTOF10: 0
PAINLEVEL_OUTOF10: 5
PAINLEVEL_OUTOF10: 0

## 2019-01-14 ASSESSMENT — ENCOUNTER SYMPTOMS
ANAL BLEEDING: 0
STRIDOR: 0
NAUSEA: 0
WHEEZING: 0
RECTAL PAIN: 1
SHORTNESS OF BREATH: 0
DIARRHEA: 0

## 2019-01-15 LAB
ANION GAP SERPL CALCULATED.3IONS-SCNC: 17 MEQ/L (ref 7–13)
BANDED NEUTROPHILS RELATIVE PERCENT: 1 % (ref 5–11)
BASOPHILS ABSOLUTE: 0 K/UL (ref 0–0.2)
BASOPHILS RELATIVE PERCENT: 0.3 %
BUN BLDV-MCNC: 74 MG/DL (ref 6–20)
CALCIUM SERPL-MCNC: 9 MG/DL (ref 8.6–10.2)
CHLORIDE BLD-SCNC: 104 MEQ/L (ref 98–107)
CO2: 20 MEQ/L (ref 22–29)
CREAT SERPL-MCNC: 3.76 MG/DL (ref 0.5–0.9)
EOSINOPHILS ABSOLUTE: 0.1 K/UL (ref 0–0.7)
EOSINOPHILS RELATIVE PERCENT: 1 %
GFR AFRICAN AMERICAN: 15
GFR NON-AFRICAN AMERICAN: 12.4
GLUCOSE BLD-MCNC: 109 MG/DL (ref 74–109)
GLUCOSE BLD-MCNC: 133 MG/DL (ref 60–115)
GLUCOSE BLD-MCNC: 142 MG/DL (ref 60–115)
GLUCOSE BLD-MCNC: 88 MG/DL (ref 60–115)
GLUCOSE BLD-MCNC: 95 MG/DL (ref 60–115)
HCT VFR BLD CALC: 31.7 % (ref 37–47)
HEMOGLOBIN: 10.8 G/DL (ref 12–16)
HYPOCHROMIA: ABNORMAL
LYMPHOCYTES ABSOLUTE: 1.7 K/UL (ref 1–4.8)
LYMPHOCYTES RELATIVE PERCENT: 13 %
MCH RBC QN AUTO: 26 PG (ref 27–31.3)
MCHC RBC AUTO-ENTMCNC: 34 % (ref 33–37)
MCV RBC AUTO: 76.5 FL (ref 82–100)
METAMYELOCYTES RELATIVE PERCENT: 2 %
MICROCYTES: ABNORMAL
MONOCYTES ABSOLUTE: 1.2 K/UL (ref 0.2–0.8)
MONOCYTES RELATIVE PERCENT: 9.3 %
MYELOCYTE PERCENT: 2 %
NEUTROPHILS ABSOLUTE: 10 K/UL (ref 1.4–6.5)
NEUTROPHILS RELATIVE PERCENT: 73 %
PDW BLD-RTO: 18.1 % (ref 11.5–14.5)
PERFORMED ON: ABNORMAL
PERFORMED ON: ABNORMAL
PERFORMED ON: NORMAL
PERFORMED ON: NORMAL
PLATELET # BLD: 259 K/UL (ref 130–400)
POIKILOCYTES: ABNORMAL
POTASSIUM SERPL-SCNC: 4.3 MEQ/L (ref 3.5–5.1)
RBC # BLD: 4.15 M/UL (ref 4.2–5.4)
SMUDGE CELLS: 3.4
SODIUM BLD-SCNC: 141 MEQ/L (ref 132–144)
WBC # BLD: 12.8 K/UL (ref 4.8–10.8)

## 2019-01-15 PROCEDURE — 94762 N-INVAS EAR/PLS OXIMTRY CONT: CPT

## 2019-01-15 PROCEDURE — 6360000002 HC RX W HCPCS: Performed by: INTERNAL MEDICINE

## 2019-01-15 PROCEDURE — 80048 BASIC METABOLIC PNL TOTAL CA: CPT

## 2019-01-15 PROCEDURE — 6370000000 HC RX 637 (ALT 250 FOR IP): Performed by: INTERNAL MEDICINE

## 2019-01-15 PROCEDURE — 36415 COLL VENOUS BLD VENIPUNCTURE: CPT

## 2019-01-15 PROCEDURE — 85025 COMPLETE CBC W/AUTO DIFF WBC: CPT

## 2019-01-15 PROCEDURE — 1210000000 HC MED SURG R&B

## 2019-01-15 PROCEDURE — 2580000003 HC RX 258: Performed by: INTERNAL MEDICINE

## 2019-01-15 RX ORDER — HYDRALAZINE HYDROCHLORIDE 20 MG/ML
10 INJECTION INTRAMUSCULAR; INTRAVENOUS EVERY 4 HOURS PRN
Status: DISCONTINUED | OUTPATIENT
Start: 2019-01-15 | End: 2019-01-16 | Stop reason: HOSPADM

## 2019-01-15 RX ADMIN — CLINDAMYCIN HYDROCHLORIDE 300 MG: 300 CAPSULE ORAL at 22:10

## 2019-01-15 RX ADMIN — CLINDAMYCIN HYDROCHLORIDE 300 MG: 300 CAPSULE ORAL at 05:40

## 2019-01-15 RX ADMIN — INSULIN LISPRO 1 UNITS: 100 INJECTION, SOLUTION INTRAVENOUS; SUBCUTANEOUS at 20:36

## 2019-01-15 RX ADMIN — LURASIDONE HYDROCHLORIDE 60 MG: 40 TABLET, FILM COATED ORAL at 11:04

## 2019-01-15 RX ADMIN — ANTACID TABLETS 1000 MG: 500 TABLET, CHEWABLE ORAL at 22:48

## 2019-01-15 RX ADMIN — TOPIRAMATE 50 MG: 25 TABLET, FILM COATED ORAL at 20:30

## 2019-01-15 RX ADMIN — CLINDAMYCIN HYDROCHLORIDE 300 MG: 300 CAPSULE ORAL at 15:31

## 2019-01-15 RX ADMIN — DULOXETINE HYDROCHLORIDE 30 MG: 30 CAPSULE, DELAYED RELEASE ORAL at 11:05

## 2019-01-15 RX ADMIN — LEVOTHYROXINE SODIUM 50 MCG: 50 TABLET ORAL at 05:40

## 2019-01-15 RX ADMIN — GABAPENTIN 100 MG: 100 CAPSULE ORAL at 20:37

## 2019-01-15 RX ADMIN — ENOXAPARIN SODIUM 30 MG: 30 INJECTION SUBCUTANEOUS at 17:36

## 2019-01-15 RX ADMIN — ASPIRIN 81 MG 81 MG: 81 TABLET ORAL at 11:05

## 2019-01-15 RX ADMIN — LINAGLIPTIN 5 MG: 5 TABLET, FILM COATED ORAL at 11:05

## 2019-01-15 RX ADMIN — TRAZODONE HYDROCHLORIDE 100 MG: 100 TABLET ORAL at 20:30

## 2019-01-15 RX ADMIN — AMLODIPINE BESYLATE 5 MG: 5 TABLET ORAL at 11:05

## 2019-01-15 RX ADMIN — Medication 10 ML: at 11:07

## 2019-01-15 RX ADMIN — GABAPENTIN 100 MG: 100 CAPSULE ORAL at 15:32

## 2019-01-15 RX ADMIN — OXYCODONE AND ACETAMINOPHEN 1 TABLET: 5; 325 TABLET ORAL at 11:12

## 2019-01-15 RX ADMIN — GABAPENTIN 100 MG: 100 CAPSULE ORAL at 11:06

## 2019-01-15 RX ADMIN — DOCUSATE SODIUM 100 MG: 100 CAPSULE, LIQUID FILLED ORAL at 20:30

## 2019-01-15 RX ADMIN — VITAMIN D, TAB 1000IU (100/BT) 2000 UNITS: 25 TAB at 11:05

## 2019-01-15 RX ADMIN — Medication 10 ML: at 20:31

## 2019-01-15 RX ADMIN — BUPROPION HYDROCHLORIDE 150 MG: 150 TABLET, EXTENDED RELEASE ORAL at 11:05

## 2019-01-15 ASSESSMENT — ENCOUNTER SYMPTOMS
STRIDOR: 0
WHEEZING: 0
NAUSEA: 0
SHORTNESS OF BREATH: 0
DIARRHEA: 0
ANAL BLEEDING: 0
RECTAL PAIN: 1

## 2019-01-15 ASSESSMENT — PAIN DESCRIPTION - PAIN TYPE: TYPE: ACUTE PAIN

## 2019-01-15 ASSESSMENT — PAIN SCALES - GENERAL
PAINLEVEL_OUTOF10: 0
PAINLEVEL_OUTOF10: 0
PAINLEVEL_OUTOF10: 5
PAINLEVEL_OUTOF10: 0
PAINLEVEL_OUTOF10: 6
PAINLEVEL_OUTOF10: 0
PAINLEVEL_OUTOF10: 0

## 2019-01-15 ASSESSMENT — PAIN DESCRIPTION - DESCRIPTORS: DESCRIPTORS: BURNING

## 2019-01-15 ASSESSMENT — PAIN - FUNCTIONAL ASSESSMENT: PAIN_FUNCTIONAL_ASSESSMENT: ACTIVITIES ARE NOT PREVENTED

## 2019-01-15 ASSESSMENT — PAIN DESCRIPTION - LOCATION: LOCATION: BUTTOCKS

## 2019-01-15 ASSESSMENT — PAIN DESCRIPTION - ORIENTATION: ORIENTATION: LEFT

## 2019-01-15 ASSESSMENT — PAIN DESCRIPTION - ONSET: ONSET: UNABLE TO ASSESS

## 2019-01-15 ASSESSMENT — PAIN DESCRIPTION - FREQUENCY: FREQUENCY: INTERMITTENT

## 2019-01-16 VITALS
HEART RATE: 85 BPM | WEIGHT: 293 LBS | HEIGHT: 60 IN | OXYGEN SATURATION: 98 % | DIASTOLIC BLOOD PRESSURE: 84 MMHG | SYSTOLIC BLOOD PRESSURE: 179 MMHG | RESPIRATION RATE: 18 BRPM | BODY MASS INDEX: 57.52 KG/M2 | TEMPERATURE: 97.2 F

## 2019-01-16 LAB
ANION GAP SERPL CALCULATED.3IONS-SCNC: 14 MEQ/L (ref 7–13)
BASOPHILS ABSOLUTE: 0 K/UL (ref 0–0.2)
BASOPHILS RELATIVE PERCENT: 0.3 %
BUN BLDV-MCNC: 64 MG/DL (ref 6–20)
CALCIUM SERPL-MCNC: 9.2 MG/DL (ref 8.6–10.2)
CHLORIDE BLD-SCNC: 107 MEQ/L (ref 98–107)
CO2: 22 MEQ/L (ref 22–29)
CREAT SERPL-MCNC: 2.82 MG/DL (ref 0.5–0.9)
EOSINOPHILS ABSOLUTE: 0.3 K/UL (ref 0–0.7)
EOSINOPHILS RELATIVE PERCENT: 2.1 %
GFR AFRICAN AMERICAN: 20.9
GFR NON-AFRICAN AMERICAN: 17.3
GLUCOSE BLD-MCNC: 114 MG/DL (ref 74–109)
GLUCOSE BLD-MCNC: 115 MG/DL (ref 60–115)
GLUCOSE BLD-MCNC: 116 MG/DL (ref 60–115)
HCT VFR BLD CALC: 30.4 % (ref 37–47)
HEMOGLOBIN: 10.2 G/DL (ref 12–16)
LYMPHOCYTES ABSOLUTE: 1.4 K/UL (ref 1–4.8)
LYMPHOCYTES RELATIVE PERCENT: 11.5 %
MCH RBC QN AUTO: 25.7 PG (ref 27–31.3)
MCHC RBC AUTO-ENTMCNC: 33.6 % (ref 33–37)
MCV RBC AUTO: 76.6 FL (ref 82–100)
MONOCYTES ABSOLUTE: 0.9 K/UL (ref 0.2–0.8)
MONOCYTES RELATIVE PERCENT: 7.6 %
NEUTROPHILS ABSOLUTE: 9.5 K/UL (ref 1.4–6.5)
NEUTROPHILS RELATIVE PERCENT: 78.5 %
PDW BLD-RTO: 17.8 % (ref 11.5–14.5)
PERFORMED ON: ABNORMAL
PERFORMED ON: NORMAL
PLATELET # BLD: 252 K/UL (ref 130–400)
POTASSIUM SERPL-SCNC: 4.7 MEQ/L (ref 3.5–5.1)
RBC # BLD: 3.97 M/UL (ref 4.2–5.4)
SODIUM BLD-SCNC: 143 MEQ/L (ref 132–144)
WBC # BLD: 12.1 K/UL (ref 4.8–10.8)

## 2019-01-16 PROCEDURE — 36415 COLL VENOUS BLD VENIPUNCTURE: CPT

## 2019-01-16 PROCEDURE — 6370000000 HC RX 637 (ALT 250 FOR IP): Performed by: INTERNAL MEDICINE

## 2019-01-16 PROCEDURE — 97535 SELF CARE MNGMENT TRAINING: CPT

## 2019-01-16 PROCEDURE — 80048 BASIC METABOLIC PNL TOTAL CA: CPT

## 2019-01-16 PROCEDURE — 2580000003 HC RX 258: Performed by: INTERNAL MEDICINE

## 2019-01-16 PROCEDURE — 97116 GAIT TRAINING THERAPY: CPT

## 2019-01-16 PROCEDURE — 85025 COMPLETE CBC W/AUTO DIFF WBC: CPT

## 2019-01-16 RX ORDER — OXYCODONE HYDROCHLORIDE AND ACETAMINOPHEN 5; 325 MG/1; MG/1
1 TABLET ORAL EVERY 8 HOURS PRN
Qty: 20 TABLET | Refills: 0 | Status: SHIPPED | OUTPATIENT
Start: 2019-01-16 | End: 2019-01-19

## 2019-01-16 RX ORDER — CLINDAMYCIN HYDROCHLORIDE 300 MG/1
300 CAPSULE ORAL EVERY 8 HOURS SCHEDULED
Qty: 42 CAPSULE | Refills: 0 | Status: SHIPPED | OUTPATIENT
Start: 2019-01-16 | End: 2019-01-30

## 2019-01-16 RX ORDER — AMLODIPINE BESYLATE 5 MG/1
5 TABLET ORAL 2 TIMES DAILY
Status: DISCONTINUED | OUTPATIENT
Start: 2019-01-16 | End: 2019-01-16 | Stop reason: HOSPADM

## 2019-01-16 RX ORDER — AMLODIPINE BESYLATE 5 MG/1
5 TABLET ORAL 2 TIMES DAILY
Qty: 30 TABLET | Refills: 3 | Status: ON HOLD | OUTPATIENT
Start: 2019-01-16 | End: 2022-03-15

## 2019-01-16 RX ORDER — PSEUDOEPHEDRINE HCL 30 MG
100 TABLET ORAL 2 TIMES DAILY
Qty: 60 CAPSULE | Refills: 1 | Status: ON HOLD | OUTPATIENT
Start: 2019-01-16 | End: 2022-03-15

## 2019-01-16 RX ADMIN — LINAGLIPTIN 5 MG: 5 TABLET, FILM COATED ORAL at 08:41

## 2019-01-16 RX ADMIN — DULOXETINE HYDROCHLORIDE 30 MG: 30 CAPSULE, DELAYED RELEASE ORAL at 08:42

## 2019-01-16 RX ADMIN — LURASIDONE HYDROCHLORIDE 60 MG: 40 TABLET, FILM COATED ORAL at 08:41

## 2019-01-16 RX ADMIN — AMLODIPINE BESYLATE 5 MG: 5 TABLET ORAL at 08:42

## 2019-01-16 RX ADMIN — ACETAMINOPHEN 650 MG: 325 TABLET ORAL at 08:41

## 2019-01-16 RX ADMIN — Medication 10 ML: at 08:45

## 2019-01-16 RX ADMIN — CLINDAMYCIN HYDROCHLORIDE 300 MG: 300 CAPSULE ORAL at 05:36

## 2019-01-16 RX ADMIN — LEVOTHYROXINE SODIUM 50 MCG: 50 TABLET ORAL at 05:37

## 2019-01-16 RX ADMIN — BUPROPION HYDROCHLORIDE 150 MG: 150 TABLET, EXTENDED RELEASE ORAL at 08:42

## 2019-01-16 RX ADMIN — VITAMIN D, TAB 1000IU (100/BT) 2000 UNITS: 25 TAB at 08:42

## 2019-01-16 RX ADMIN — CLINDAMYCIN HYDROCHLORIDE 300 MG: 300 CAPSULE ORAL at 13:21

## 2019-01-16 RX ADMIN — GABAPENTIN 100 MG: 100 CAPSULE ORAL at 08:42

## 2019-01-16 RX ADMIN — ASPIRIN 81 MG 81 MG: 81 TABLET ORAL at 08:42

## 2019-01-16 RX ADMIN — DOCUSATE SODIUM 100 MG: 100 CAPSULE, LIQUID FILLED ORAL at 08:42

## 2019-01-16 RX ADMIN — GABAPENTIN 100 MG: 100 CAPSULE ORAL at 13:21

## 2019-01-16 ASSESSMENT — PAIN SCALES - GENERAL
PAINLEVEL_OUTOF10: 7
PAINLEVEL_OUTOF10: 0

## 2019-01-18 ENCOUNTER — HOSPITAL ENCOUNTER (EMERGENCY)
Age: 59
Discharge: HOME | End: 2019-01-18
Attending: EMERGENCY MEDICINE
Payer: MEDICAID

## 2019-01-18 VITALS
RESPIRATION RATE: 16 BRPM | HEART RATE: 69 BPM | SYSTOLIC BLOOD PRESSURE: 155 MMHG | DIASTOLIC BLOOD PRESSURE: 66 MMHG | OXYGEN SATURATION: 100 % | TEMPERATURE: 98.6 F

## 2019-01-18 DIAGNOSIS — R53.1 GENERALIZED WEAKNESS: ICD-10-CM

## 2019-01-18 DIAGNOSIS — R11.0 NAUSEA: Primary | ICD-10-CM

## 2019-01-18 LAB
ALBUMIN SERPL-MCNC: 3.3 G/DL (ref 3.9–4.9)
ALP BLD-CCNC: 226 U/L (ref 40–130)
ALT SERPL-CCNC: 45 U/L (ref 0–33)
ANION GAP SERPL CALCULATED.3IONS-SCNC: 13 MEQ/L (ref 7–13)
AST SERPL-CCNC: 24 U/L (ref 0–35)
BASOPHILS ABSOLUTE: 0 K/UL (ref 0–0.2)
BASOPHILS RELATIVE PERCENT: 0.3 %
BILIRUB SERPL-MCNC: 0.5 MG/DL (ref 0–1.2)
BUN BLDV-MCNC: 41 MG/DL (ref 6–20)
CALCIUM SERPL-MCNC: 8.9 MG/DL (ref 8.6–10.2)
CHLORIDE BLD-SCNC: 104 MEQ/L (ref 98–107)
CO2: 22 MEQ/L (ref 22–29)
CREAT SERPL-MCNC: 1.51 MG/DL (ref 0.5–0.9)
EOSINOPHILS ABSOLUTE: 0.2 K/UL (ref 0–0.7)
EOSINOPHILS RELATIVE PERCENT: 1.4 %
GFR AFRICAN AMERICAN: 43
GFR NON-AFRICAN AMERICAN: 35.5
GLOBULIN: 3.4 G/DL (ref 2.3–3.5)
GLUCOSE BLD-MCNC: 110 MG/DL (ref 74–109)
HCT VFR BLD CALC: 32.9 % (ref 37–47)
HEMOGLOBIN: 10.9 G/DL (ref 12–16)
LYMPHOCYTES ABSOLUTE: 1.7 K/UL (ref 1–4.8)
LYMPHOCYTES RELATIVE PERCENT: 14.1 %
MCH RBC QN AUTO: 25.5 PG (ref 27–31.3)
MCHC RBC AUTO-ENTMCNC: 33 % (ref 33–37)
MCV RBC AUTO: 77.2 FL (ref 82–100)
MONOCYTES ABSOLUTE: 0.9 K/UL (ref 0.2–0.8)
MONOCYTES RELATIVE PERCENT: 7.6 %
NEUTROPHILS ABSOLUTE: 9.1 K/UL (ref 1.4–6.5)
NEUTROPHILS RELATIVE PERCENT: 76.6 %
PDW BLD-RTO: 17.8 % (ref 11.5–14.5)
PLATELET # BLD: 216 K/UL (ref 130–400)
POTASSIUM SERPL-SCNC: 4.2 MEQ/L (ref 3.5–5.1)
RBC # BLD: 4.26 M/UL (ref 4.2–5.4)
SODIUM BLD-SCNC: 139 MEQ/L (ref 132–144)
TOTAL PROTEIN: 6.7 G/DL (ref 6.4–8.1)
WBC # BLD: 11.9 K/UL (ref 4.8–10.8)

## 2019-01-18 PROCEDURE — 2580000003 HC RX 258: Performed by: EMERGENCY MEDICINE

## 2019-01-18 PROCEDURE — 36415 COLL VENOUS BLD VENIPUNCTURE: CPT

## 2019-01-18 PROCEDURE — 80053 COMPREHEN METABOLIC PANEL: CPT

## 2019-01-18 PROCEDURE — 6360000002 HC RX W HCPCS: Performed by: EMERGENCY MEDICINE

## 2019-01-18 PROCEDURE — 99283 EMERGENCY DEPT VISIT LOW MDM: CPT

## 2019-01-18 PROCEDURE — 85025 COMPLETE CBC W/AUTO DIFF WBC: CPT

## 2019-01-18 PROCEDURE — 96374 THER/PROPH/DIAG INJ IV PUSH: CPT

## 2019-01-18 RX ORDER — SODIUM CHLORIDE 0.9 % (FLUSH) 0.9 %
3 SYRINGE (ML) INJECTION EVERY 8 HOURS
Status: DISCONTINUED | OUTPATIENT
Start: 2019-01-18 | End: 2019-01-19 | Stop reason: HOSPADM

## 2019-01-18 RX ORDER — PROMETHAZINE HYDROCHLORIDE 25 MG/1
25 SUPPOSITORY RECTAL EVERY 6 HOURS PRN
Qty: 20 SUPPOSITORY | Refills: 0 | Status: SHIPPED | OUTPATIENT
Start: 2019-01-18 | End: 2019-01-25

## 2019-01-18 RX ORDER — ONDANSETRON 2 MG/ML
4 INJECTION INTRAMUSCULAR; INTRAVENOUS ONCE
Status: COMPLETED | OUTPATIENT
Start: 2019-01-18 | End: 2019-01-18

## 2019-01-18 RX ORDER — ONDANSETRON 4 MG/1
4 TABLET, FILM COATED ORAL EVERY 8 HOURS PRN
Qty: 20 TABLET | Refills: 0 | Status: ON HOLD | OUTPATIENT
Start: 2019-01-18 | End: 2019-02-06 | Stop reason: HOSPADM

## 2019-01-18 RX ORDER — 0.9 % SODIUM CHLORIDE 0.9 %
500 INTRAVENOUS SOLUTION INTRAVENOUS ONCE
Status: COMPLETED | OUTPATIENT
Start: 2019-01-18 | End: 2019-01-18

## 2019-01-18 RX ADMIN — ONDANSETRON 4 MG: 2 INJECTION INTRAMUSCULAR; INTRAVENOUS at 20:46

## 2019-01-18 RX ADMIN — SODIUM CHLORIDE 500 ML: 9 INJECTION, SOLUTION INTRAVENOUS at 20:46

## 2019-01-22 ENCOUNTER — OFFICE VISIT (OUTPATIENT)
Dept: SURGERY | Age: 59
End: 2019-01-22

## 2019-01-22 VITALS — TEMPERATURE: 97.4 F | BODY MASS INDEX: 57.52 KG/M2 | HEIGHT: 60 IN | WEIGHT: 293 LBS

## 2019-01-22 DIAGNOSIS — K61.1 PERIRECTAL ABSCESS: Primary | ICD-10-CM

## 2019-01-22 PROCEDURE — 99024 POSTOP FOLLOW-UP VISIT: CPT | Performed by: COLON & RECTAL SURGERY

## 2019-01-22 ASSESSMENT — ENCOUNTER SYMPTOMS
RECTAL PAIN: 0
ANAL BLEEDING: 0
ABDOMINAL PAIN: 0
CONSTIPATION: 0
WHEEZING: 1
SHORTNESS OF BREATH: 0
ABDOMINAL DISTENTION: 0
CHEST TIGHTNESS: 0

## 2019-02-02 ENCOUNTER — HOSPITAL ENCOUNTER (INPATIENT)
Age: 59
LOS: 3 days | Discharge: HOME | DRG: 753 | End: 2019-02-06
Attending: FAMILY MEDICINE | Admitting: PSYCHIATRY & NEUROLOGY
Payer: MEDICAID

## 2019-02-02 DIAGNOSIS — F31.9 BIPOLAR 1 DISORDER, DEPRESSED (HCC): Primary | ICD-10-CM

## 2019-02-02 LAB
ACETAMINOPHEN LEVEL: <5 UG/ML (ref 10–30)
ALBUMIN SERPL-MCNC: 3.7 G/DL (ref 3.9–4.9)
ALP BLD-CCNC: 181 U/L (ref 40–130)
ALT SERPL-CCNC: 24 U/L (ref 0–33)
AMPHETAMINE SCREEN, URINE: NORMAL
ANION GAP SERPL CALCULATED.3IONS-SCNC: 15 MEQ/L (ref 7–13)
AST SERPL-CCNC: 20 U/L (ref 0–35)
BARBITURATE SCREEN URINE: NORMAL
BASOPHILS ABSOLUTE: 0 K/UL (ref 0–0.2)
BASOPHILS RELATIVE PERCENT: 0.6 %
BENZODIAZEPINE SCREEN, URINE: NORMAL
BILIRUB SERPL-MCNC: 0.7 MG/DL (ref 0–1.2)
BUN BLDV-MCNC: 13 MG/DL (ref 6–20)
CALCIUM SERPL-MCNC: 9 MG/DL (ref 8.6–10.2)
CANNABINOID SCREEN URINE: NORMAL
CHLORIDE BLD-SCNC: 102 MEQ/L (ref 98–107)
CO2: 23 MEQ/L (ref 22–29)
COCAINE METABOLITE SCREEN URINE: NORMAL
CREAT SERPL-MCNC: 1.38 MG/DL (ref 0.5–0.9)
EOSINOPHILS ABSOLUTE: 0.1 K/UL (ref 0–0.7)
EOSINOPHILS RELATIVE PERCENT: 1.3 %
ETHANOL PERCENT: NORMAL G/DL
ETHANOL: <10 MG/DL (ref 0–0.08)
GFR AFRICAN AMERICAN: 47.7
GFR NON-AFRICAN AMERICAN: 39.4
GLOBULIN: 3.8 G/DL (ref 2.3–3.5)
GLUCOSE BLD-MCNC: 120 MG/DL (ref 74–109)
HCT VFR BLD CALC: 33.1 % (ref 37–47)
HEMOGLOBIN: 10.8 G/DL (ref 12–16)
LACTIC ACID: 0.8 MMOL/L (ref 0.5–2.2)
LYMPHOCYTES ABSOLUTE: 1.3 K/UL (ref 1–4.8)
LYMPHOCYTES RELATIVE PERCENT: 24.9 %
Lab: NORMAL
MCH RBC QN AUTO: 25.3 PG (ref 27–31.3)
MCHC RBC AUTO-ENTMCNC: 32.7 % (ref 33–37)
MCV RBC AUTO: 77.6 FL (ref 82–100)
MONOCYTES ABSOLUTE: 0.6 K/UL (ref 0.2–0.8)
MONOCYTES RELATIVE PERCENT: 11.4 %
NEUTROPHILS ABSOLUTE: 3.1 K/UL (ref 1.4–6.5)
NEUTROPHILS RELATIVE PERCENT: 61.8 %
OPIATE SCREEN URINE: NORMAL
PDW BLD-RTO: 16.8 % (ref 11.5–14.5)
PHENCYCLIDINE SCREEN URINE: NORMAL
PLATELET # BLD: 210 K/UL (ref 130–400)
POTASSIUM SERPL-SCNC: 4.3 MEQ/L (ref 3.5–5.1)
RBC # BLD: 4.26 M/UL (ref 4.2–5.4)
REASON FOR REJECTION: NORMAL
REJECTED TEST: NORMAL
SALICYLATE, SERUM: <0.3 MG/DL (ref 15–30)
SODIUM BLD-SCNC: 140 MEQ/L (ref 132–144)
TOTAL CK: 19 U/L (ref 0–170)
TOTAL PROTEIN: 7.5 G/DL (ref 6.4–8.1)
TSH SERPL DL<=0.05 MIU/L-ACNC: 2.75 UIU/ML (ref 0.27–4.2)
WBC # BLD: 5.1 K/UL (ref 4.8–10.8)

## 2019-02-02 PROCEDURE — 99285 EMERGENCY DEPT VISIT HI MDM: CPT

## 2019-02-02 PROCEDURE — 87040 BLOOD CULTURE FOR BACTERIA: CPT

## 2019-02-02 PROCEDURE — G0480 DRUG TEST DEF 1-7 CLASSES: HCPCS

## 2019-02-02 PROCEDURE — 82550 ASSAY OF CK (CPK): CPT

## 2019-02-02 PROCEDURE — 83605 ASSAY OF LACTIC ACID: CPT

## 2019-02-02 PROCEDURE — 80307 DRUG TEST PRSMV CHEM ANLYZR: CPT

## 2019-02-02 PROCEDURE — 85025 COMPLETE CBC W/AUTO DIFF WBC: CPT

## 2019-02-02 PROCEDURE — 6370000000 HC RX 637 (ALT 250 FOR IP): Performed by: FAMILY MEDICINE

## 2019-02-02 PROCEDURE — 84443 ASSAY THYROID STIM HORMONE: CPT

## 2019-02-02 PROCEDURE — 80053 COMPREHEN METABOLIC PANEL: CPT

## 2019-02-02 PROCEDURE — 36415 COLL VENOUS BLD VENIPUNCTURE: CPT

## 2019-02-02 RX ORDER — AMLODIPINE BESYLATE 5 MG/1
5 TABLET ORAL ONCE
Status: COMPLETED | OUTPATIENT
Start: 2019-02-02 | End: 2019-02-02

## 2019-02-02 RX ADMIN — AMLODIPINE BESYLATE 5 MG: 5 TABLET ORAL at 21:38

## 2019-02-02 ASSESSMENT — PAIN SCALES - GENERAL: PAINLEVEL_OUTOF10: 3

## 2019-02-02 ASSESSMENT — PAIN DESCRIPTION - DESCRIPTORS: DESCRIPTORS: BURNING

## 2019-02-02 ASSESSMENT — PAIN DESCRIPTION - LOCATION: LOCATION: BUTTOCKS

## 2019-02-02 ASSESSMENT — PAIN DESCRIPTION - PAIN TYPE: TYPE: ACUTE PAIN

## 2019-02-03 PROBLEM — F31.4 BIPOLAR 1 DISORDER, DEPRESSED, SEVERE (HCC): Status: ACTIVE | Noted: 2019-02-03

## 2019-02-03 LAB
GLUCOSE BLD-MCNC: 117 MG/DL (ref 60–115)
GLUCOSE BLD-MCNC: 125 MG/DL (ref 60–115)
GLUCOSE BLD-MCNC: 140 MG/DL (ref 60–115)
GLUCOSE BLD-MCNC: 152 MG/DL (ref 60–115)
PERFORMED ON: ABNORMAL

## 2019-02-03 PROCEDURE — 6370000000 HC RX 637 (ALT 250 FOR IP): Performed by: PSYCHIATRY & NEUROLOGY

## 2019-02-03 PROCEDURE — 6370000000 HC RX 637 (ALT 250 FOR IP): Performed by: NURSE PRACTITIONER

## 2019-02-03 PROCEDURE — 6370000000 HC RX 637 (ALT 250 FOR IP): Performed by: PHYSICIAN ASSISTANT

## 2019-02-03 PROCEDURE — 1240000000 HC EMOTIONAL WELLNESS R&B

## 2019-02-03 RX ORDER — METRONIDAZOLE 500 MG/1
500 TABLET ORAL 3 TIMES DAILY
Status: DISCONTINUED | OUTPATIENT
Start: 2019-02-03 | End: 2019-02-06 | Stop reason: HOSPADM

## 2019-02-03 RX ORDER — LEVOTHYROXINE SODIUM 0.05 MG/1
50 TABLET ORAL DAILY
Status: DISCONTINUED | OUTPATIENT
Start: 2019-02-03 | End: 2019-02-06 | Stop reason: HOSPADM

## 2019-02-03 RX ORDER — DEXTROSE MONOHYDRATE 25 G/50ML
12.5 INJECTION, SOLUTION INTRAVENOUS PRN
Status: DISCONTINUED | OUTPATIENT
Start: 2019-02-03 | End: 2019-02-06 | Stop reason: HOSPADM

## 2019-02-03 RX ORDER — HALOPERIDOL 5 MG/ML
5 INJECTION INTRAMUSCULAR EVERY 6 HOURS PRN
Status: DISCONTINUED | OUTPATIENT
Start: 2019-02-03 | End: 2019-02-06 | Stop reason: HOSPADM

## 2019-02-03 RX ORDER — BUPROPION HYDROCHLORIDE 100 MG/1
100 TABLET ORAL 2 TIMES DAILY
Status: DISCONTINUED | OUTPATIENT
Start: 2019-02-03 | End: 2019-02-03

## 2019-02-03 RX ORDER — LOSARTAN POTASSIUM 100 MG/1
100 TABLET ORAL DAILY
Status: ON HOLD | COMMUNITY
End: 2022-03-15

## 2019-02-03 RX ORDER — TRAZODONE HYDROCHLORIDE 100 MG/1
100 TABLET ORAL NIGHTLY
Status: DISCONTINUED | OUTPATIENT
Start: 2019-02-03 | End: 2019-02-06 | Stop reason: HOSPADM

## 2019-02-03 RX ORDER — CLONIDINE HYDROCHLORIDE 0.1 MG/1
0.1 TABLET ORAL ONCE
Status: COMPLETED | OUTPATIENT
Start: 2019-02-03 | End: 2019-02-03

## 2019-02-03 RX ORDER — CIPROFLOXACIN 500 MG/1
500 TABLET, FILM COATED ORAL 2 TIMES DAILY
Status: ON HOLD | COMMUNITY
Start: 2019-01-31 | End: 2019-02-06 | Stop reason: HOSPADM

## 2019-02-03 RX ORDER — LOSARTAN POTASSIUM 25 MG/1
100 TABLET ORAL DAILY
Status: DISCONTINUED | OUTPATIENT
Start: 2019-02-03 | End: 2019-02-06 | Stop reason: HOSPADM

## 2019-02-03 RX ORDER — DICYCLOMINE HYDROCHLORIDE 10 MG/1
10 CAPSULE ORAL
Status: ON HOLD | COMMUNITY
End: 2019-02-06 | Stop reason: HOSPADM

## 2019-02-03 RX ORDER — PRAZOSIN HYDROCHLORIDE 1 MG/1
1 CAPSULE ORAL 2 TIMES DAILY
Status: ON HOLD | COMMUNITY
End: 2019-02-06 | Stop reason: HOSPADM

## 2019-02-03 RX ORDER — CIPROFLOXACIN 500 MG/1
500 TABLET, FILM COATED ORAL 2 TIMES DAILY
Status: DISCONTINUED | OUTPATIENT
Start: 2019-02-03 | End: 2019-02-06 | Stop reason: HOSPADM

## 2019-02-03 RX ORDER — BUPROPION HYDROCHLORIDE 150 MG/1
150 TABLET ORAL DAILY
Status: DISCONTINUED | OUTPATIENT
Start: 2019-02-04 | End: 2019-02-06 | Stop reason: HOSPADM

## 2019-02-03 RX ORDER — HALOPERIDOL 5 MG
5 TABLET ORAL EVERY 6 HOURS PRN
Status: DISCONTINUED | OUTPATIENT
Start: 2019-02-03 | End: 2019-02-06 | Stop reason: HOSPADM

## 2019-02-03 RX ORDER — ACETAMINOPHEN 325 MG/1
650 TABLET ORAL EVERY 4 HOURS PRN
Status: DISCONTINUED | OUTPATIENT
Start: 2019-02-03 | End: 2019-02-06 | Stop reason: HOSPADM

## 2019-02-03 RX ORDER — NICOTINE POLACRILEX 4 MG
15 LOZENGE BUCCAL PRN
Status: DISCONTINUED | OUTPATIENT
Start: 2019-02-03 | End: 2019-02-06 | Stop reason: HOSPADM

## 2019-02-03 RX ORDER — METRONIDAZOLE 500 MG/1
500 TABLET ORAL 3 TIMES DAILY
Status: ON HOLD | COMMUNITY
Start: 2019-01-31 | End: 2019-02-06 | Stop reason: HOSPADM

## 2019-02-03 RX ORDER — HYDROXYZINE HYDROCHLORIDE 50 MG/ML
50 INJECTION, SOLUTION INTRAMUSCULAR EVERY 6 HOURS PRN
Status: DISCONTINUED | OUTPATIENT
Start: 2019-02-03 | End: 2019-02-06 | Stop reason: HOSPADM

## 2019-02-03 RX ORDER — HYDROXYZINE PAMOATE 50 MG/1
50 CAPSULE ORAL EVERY 6 HOURS PRN
Status: DISCONTINUED | OUTPATIENT
Start: 2019-02-03 | End: 2019-02-06 | Stop reason: HOSPADM

## 2019-02-03 RX ORDER — DEXTROSE MONOHYDRATE 50 MG/ML
100 INJECTION, SOLUTION INTRAVENOUS PRN
Status: DISCONTINUED | OUTPATIENT
Start: 2019-02-03 | End: 2019-02-06 | Stop reason: HOSPADM

## 2019-02-03 RX ORDER — ASPIRIN 81 MG/1
81 TABLET, CHEWABLE ORAL DAILY
Status: DISCONTINUED | OUTPATIENT
Start: 2019-02-03 | End: 2019-02-06 | Stop reason: HOSPADM

## 2019-02-03 RX ORDER — PRAZOSIN HYDROCHLORIDE 1 MG/1
1 CAPSULE ORAL 2 TIMES DAILY
Status: DISCONTINUED | OUTPATIENT
Start: 2019-02-03 | End: 2019-02-06 | Stop reason: HOSPADM

## 2019-02-03 RX ORDER — AMLODIPINE BESYLATE 5 MG/1
5 TABLET ORAL 2 TIMES DAILY
Status: DISCONTINUED | OUTPATIENT
Start: 2019-02-03 | End: 2019-02-03 | Stop reason: ALTCHOICE

## 2019-02-03 RX ORDER — BENZTROPINE MESYLATE 1 MG/ML
2 INJECTION INTRAMUSCULAR; INTRAVENOUS 2 TIMES DAILY PRN
Status: DISCONTINUED | OUTPATIENT
Start: 2019-02-03 | End: 2019-02-06 | Stop reason: HOSPADM

## 2019-02-03 RX ADMIN — PRAZOSIN HYDROCHLORIDE 1 MG: 1 CAPSULE ORAL at 21:19

## 2019-02-03 RX ADMIN — TRAZODONE HYDROCHLORIDE 100 MG: 100 TABLET ORAL at 21:19

## 2019-02-03 RX ADMIN — LOSARTAN POTASSIUM 100 MG: 25 TABLET ORAL at 16:43

## 2019-02-03 RX ADMIN — METRONIDAZOLE 500 MG: 500 TABLET ORAL at 21:19

## 2019-02-03 RX ADMIN — LURASIDONE HYDROCHLORIDE 60 MG: 40 TABLET, FILM COATED ORAL at 10:01

## 2019-02-03 RX ADMIN — CIPROFLOXACIN HYDROCHLORIDE 500 MG: 500 TABLET, FILM COATED ORAL at 21:19

## 2019-02-03 RX ADMIN — LEVOTHYROXINE SODIUM 50 MCG: 50 TABLET ORAL at 10:22

## 2019-02-03 RX ADMIN — CIPROFLOXACIN HYDROCHLORIDE 500 MG: 500 TABLET, FILM COATED ORAL at 11:08

## 2019-02-03 RX ADMIN — CLONIDINE HYDROCHLORIDE 0.1 MG: 0.1 TABLET ORAL at 01:00

## 2019-02-03 RX ADMIN — METRONIDAZOLE 500 MG: 500 TABLET ORAL at 13:37

## 2019-02-03 RX ADMIN — ASPIRIN 81 MG 81 MG: 81 TABLET ORAL at 10:22

## 2019-02-03 RX ADMIN — BUPROPION HYDROCHLORIDE 100 MG: 100 TABLET, FILM COATED ORAL at 13:37

## 2019-02-03 RX ADMIN — AMLODIPINE BESYLATE 5 MG: 5 TABLET ORAL at 10:22

## 2019-02-04 PROBLEM — F43.12 CHRONIC POST-TRAUMATIC STRESS DISORDER (PTSD): Status: ACTIVE | Noted: 2019-02-04

## 2019-02-04 PROBLEM — F43.10 PTSD (POST-TRAUMATIC STRESS DISORDER): Status: ACTIVE | Noted: 2019-02-04

## 2019-02-04 PROBLEM — F41.9 ANXIETY DISORDER, UNSPECIFIED: Status: ACTIVE | Noted: 2019-02-04

## 2019-02-04 LAB
ANION GAP SERPL CALCULATED.3IONS-SCNC: 17 MEQ/L (ref 7–13)
BUN BLDV-MCNC: 22 MG/DL (ref 6–20)
CALCIUM SERPL-MCNC: 9.4 MG/DL (ref 8.6–10.2)
CHLORIDE BLD-SCNC: 98 MEQ/L (ref 98–107)
CO2: 22 MEQ/L (ref 22–29)
CREAT SERPL-MCNC: 1.78 MG/DL (ref 0.5–0.9)
GFR AFRICAN AMERICAN: 35.5
GFR NON-AFRICAN AMERICAN: 29.4
GLUCOSE BLD-MCNC: 143 MG/DL (ref 60–115)
GLUCOSE BLD-MCNC: 143 MG/DL (ref 60–115)
GLUCOSE BLD-MCNC: 156 MG/DL (ref 60–115)
GLUCOSE BLD-MCNC: 202 MG/DL (ref 74–109)
GLUCOSE BLD-MCNC: 217 MG/DL (ref 60–115)
PERFORMED ON: ABNORMAL
POTASSIUM SERPL-SCNC: 4 MEQ/L (ref 3.5–5.1)
SODIUM BLD-SCNC: 137 MEQ/L (ref 132–144)

## 2019-02-04 PROCEDURE — 6370000000 HC RX 637 (ALT 250 FOR IP): Performed by: PSYCHIATRY & NEUROLOGY

## 2019-02-04 PROCEDURE — 6370000000 HC RX 637 (ALT 250 FOR IP): Performed by: NURSE PRACTITIONER

## 2019-02-04 PROCEDURE — 36415 COLL VENOUS BLD VENIPUNCTURE: CPT

## 2019-02-04 PROCEDURE — 1240000000 HC EMOTIONAL WELLNESS R&B

## 2019-02-04 PROCEDURE — 6370000000 HC RX 637 (ALT 250 FOR IP): Performed by: PHYSICIAN ASSISTANT

## 2019-02-04 PROCEDURE — 80048 BASIC METABOLIC PNL TOTAL CA: CPT

## 2019-02-04 PROCEDURE — 99232 SBSQ HOSP IP/OBS MODERATE 35: CPT | Performed by: PSYCHIATRY & NEUROLOGY

## 2019-02-04 PROCEDURE — 99212 OFFICE O/P EST SF 10 MIN: CPT

## 2019-02-04 RX ADMIN — ASPIRIN 81 MG 81 MG: 81 TABLET ORAL at 09:07

## 2019-02-04 RX ADMIN — PRAZOSIN HYDROCHLORIDE 1 MG: 1 CAPSULE ORAL at 09:07

## 2019-02-04 RX ADMIN — LURASIDONE HYDROCHLORIDE 60 MG: 40 TABLET, FILM COATED ORAL at 09:07

## 2019-02-04 RX ADMIN — LEVOTHYROXINE SODIUM 50 MCG: 50 TABLET ORAL at 06:07

## 2019-02-04 RX ADMIN — BUPROPION HYDROCHLORIDE 150 MG: 150 TABLET, EXTENDED RELEASE ORAL at 09:07

## 2019-02-04 RX ADMIN — METRONIDAZOLE 500 MG: 500 TABLET ORAL at 09:07

## 2019-02-04 RX ADMIN — TRAZODONE HYDROCHLORIDE 100 MG: 100 TABLET ORAL at 20:56

## 2019-02-04 RX ADMIN — PRAZOSIN HYDROCHLORIDE 1 MG: 1 CAPSULE ORAL at 20:57

## 2019-02-04 RX ADMIN — CIPROFLOXACIN HYDROCHLORIDE 500 MG: 500 TABLET, FILM COATED ORAL at 09:30

## 2019-02-04 RX ADMIN — INSULIN LISPRO 1 UNITS: 100 INJECTION, SOLUTION INTRAVENOUS; SUBCUTANEOUS at 20:59

## 2019-02-04 RX ADMIN — LOSARTAN POTASSIUM 100 MG: 25 TABLET ORAL at 09:07

## 2019-02-04 RX ADMIN — CIPROFLOXACIN HYDROCHLORIDE 500 MG: 500 TABLET, FILM COATED ORAL at 20:56

## 2019-02-04 RX ADMIN — METRONIDAZOLE 500 MG: 500 TABLET ORAL at 13:52

## 2019-02-04 RX ADMIN — METRONIDAZOLE 500 MG: 500 TABLET ORAL at 20:56

## 2019-02-04 ASSESSMENT — LIFESTYLE VARIABLES: HISTORY_ALCOHOL_USE: NO

## 2019-02-05 LAB
GLUCOSE BLD-MCNC: 121 MG/DL (ref 60–115)
GLUCOSE BLD-MCNC: 125 MG/DL (ref 60–115)
GLUCOSE BLD-MCNC: 143 MG/DL (ref 60–115)
GLUCOSE BLD-MCNC: 209 MG/DL (ref 60–115)
PERFORMED ON: ABNORMAL

## 2019-02-05 PROCEDURE — 1240000000 HC EMOTIONAL WELLNESS R&B

## 2019-02-05 PROCEDURE — 6370000000 HC RX 637 (ALT 250 FOR IP): Performed by: PHYSICIAN ASSISTANT

## 2019-02-05 PROCEDURE — 6370000000 HC RX 637 (ALT 250 FOR IP): Performed by: PSYCHIATRY & NEUROLOGY

## 2019-02-05 PROCEDURE — 90833 PSYTX W PT W E/M 30 MIN: CPT | Performed by: PSYCHIATRY & NEUROLOGY

## 2019-02-05 PROCEDURE — 6370000000 HC RX 637 (ALT 250 FOR IP): Performed by: NURSE PRACTITIONER

## 2019-02-05 PROCEDURE — 99232 SBSQ HOSP IP/OBS MODERATE 35: CPT | Performed by: PSYCHIATRY & NEUROLOGY

## 2019-02-05 RX ADMIN — METRONIDAZOLE 500 MG: 500 TABLET ORAL at 21:46

## 2019-02-05 RX ADMIN — LOSARTAN POTASSIUM 100 MG: 25 TABLET ORAL at 08:45

## 2019-02-05 RX ADMIN — LEVOTHYROXINE SODIUM 50 MCG: 50 TABLET ORAL at 06:15

## 2019-02-05 RX ADMIN — ASPIRIN 81 MG 81 MG: 81 TABLET ORAL at 08:45

## 2019-02-05 RX ADMIN — METRONIDAZOLE 500 MG: 500 TABLET ORAL at 13:59

## 2019-02-05 RX ADMIN — CIPROFLOXACIN HYDROCHLORIDE 500 MG: 500 TABLET, FILM COATED ORAL at 21:41

## 2019-02-05 RX ADMIN — BUPROPION HYDROCHLORIDE 150 MG: 150 TABLET, EXTENDED RELEASE ORAL at 08:44

## 2019-02-05 RX ADMIN — TRAZODONE HYDROCHLORIDE 100 MG: 100 TABLET ORAL at 21:42

## 2019-02-05 RX ADMIN — LURASIDONE HYDROCHLORIDE 60 MG: 40 TABLET, FILM COATED ORAL at 08:44

## 2019-02-05 RX ADMIN — PRAZOSIN HYDROCHLORIDE 1 MG: 1 CAPSULE ORAL at 08:44

## 2019-02-05 RX ADMIN — PRAZOSIN HYDROCHLORIDE 1 MG: 1 CAPSULE ORAL at 21:41

## 2019-02-05 RX ADMIN — METRONIDAZOLE 500 MG: 500 TABLET ORAL at 08:51

## 2019-02-05 RX ADMIN — CIPROFLOXACIN HYDROCHLORIDE 500 MG: 500 TABLET, FILM COATED ORAL at 10:38

## 2019-02-05 ASSESSMENT — ENCOUNTER SYMPTOMS
EYE DISCHARGE: 1
ABDOMINAL DISTENTION: 0
APNEA: 0

## 2019-02-06 VITALS
BODY MASS INDEX: 57.52 KG/M2 | RESPIRATION RATE: 18 BRPM | DIASTOLIC BLOOD PRESSURE: 80 MMHG | WEIGHT: 293 LBS | SYSTOLIC BLOOD PRESSURE: 147 MMHG | HEIGHT: 60 IN | TEMPERATURE: 97 F | HEART RATE: 109 BPM | OXYGEN SATURATION: 96 %

## 2019-02-06 LAB
ANION GAP SERPL CALCULATED.3IONS-SCNC: 16 MEQ/L (ref 7–13)
BUN BLDV-MCNC: 24 MG/DL (ref 6–20)
CALCIUM SERPL-MCNC: 9.1 MG/DL (ref 8.6–10.2)
CHLORIDE BLD-SCNC: 102 MEQ/L (ref 98–107)
CO2: 22 MEQ/L (ref 22–29)
CREAT SERPL-MCNC: 1.49 MG/DL (ref 0.5–0.9)
GFR AFRICAN AMERICAN: 43.6
GFR NON-AFRICAN AMERICAN: 36.1
GLUCOSE BLD-MCNC: 115 MG/DL (ref 60–115)
GLUCOSE BLD-MCNC: 152 MG/DL (ref 74–109)
GLUCOSE BLD-MCNC: 165 MG/DL (ref 60–115)
PERFORMED ON: ABNORMAL
PERFORMED ON: NORMAL
POTASSIUM SERPL-SCNC: 4.3 MEQ/L (ref 3.5–5.1)
SODIUM BLD-SCNC: 140 MEQ/L (ref 132–144)

## 2019-02-06 PROCEDURE — 99239 HOSP IP/OBS DSCHRG MGMT >30: CPT | Performed by: PSYCHIATRY & NEUROLOGY

## 2019-02-06 PROCEDURE — 80048 BASIC METABOLIC PNL TOTAL CA: CPT

## 2019-02-06 PROCEDURE — 6370000000 HC RX 637 (ALT 250 FOR IP): Performed by: PSYCHIATRY & NEUROLOGY

## 2019-02-06 PROCEDURE — 36415 COLL VENOUS BLD VENIPUNCTURE: CPT

## 2019-02-06 PROCEDURE — 6370000000 HC RX 637 (ALT 250 FOR IP): Performed by: NURSE PRACTITIONER

## 2019-02-06 PROCEDURE — 6370000000 HC RX 637 (ALT 250 FOR IP): Performed by: PHYSICIAN ASSISTANT

## 2019-02-06 RX ORDER — TRAZODONE HYDROCHLORIDE 100 MG/1
100 TABLET ORAL NIGHTLY
Qty: 30 TABLET | Refills: 1 | Status: ON HOLD | OUTPATIENT
Start: 2019-02-06 | End: 2022-03-15

## 2019-02-06 RX ORDER — CIPROFLOXACIN 500 MG/1
500 TABLET, FILM COATED ORAL 2 TIMES DAILY
Qty: 14 TABLET | Refills: 0 | Status: SHIPPED | OUTPATIENT
Start: 2019-02-06 | End: 2019-02-13

## 2019-02-06 RX ORDER — BUPROPION HYDROCHLORIDE 150 MG/1
150 TABLET ORAL DAILY
Qty: 30 TABLET | Refills: 1 | Status: ON HOLD | OUTPATIENT
Start: 2019-02-06 | End: 2022-03-15

## 2019-02-06 RX ORDER — METRONIDAZOLE 500 MG/1
500 TABLET ORAL 3 TIMES DAILY
Qty: 21 TABLET | Refills: 0 | Status: SHIPPED | OUTPATIENT
Start: 2019-02-06 | End: 2019-02-13

## 2019-02-06 RX ORDER — PRAZOSIN HYDROCHLORIDE 1 MG/1
1 CAPSULE ORAL 2 TIMES DAILY
Qty: 60 CAPSULE | Refills: 2 | Status: ON HOLD | OUTPATIENT
Start: 2019-02-06 | End: 2022-03-15

## 2019-02-06 RX ADMIN — CIPROFLOXACIN HYDROCHLORIDE 500 MG: 500 TABLET, FILM COATED ORAL at 10:26

## 2019-02-06 RX ADMIN — PRAZOSIN HYDROCHLORIDE 1 MG: 1 CAPSULE ORAL at 09:40

## 2019-02-06 RX ADMIN — LURASIDONE HYDROCHLORIDE 60 MG: 40 TABLET, FILM COATED ORAL at 09:32

## 2019-02-06 RX ADMIN — ASPIRIN 81 MG 81 MG: 81 TABLET ORAL at 09:32

## 2019-02-06 RX ADMIN — LOSARTAN POTASSIUM 100 MG: 25 TABLET ORAL at 09:32

## 2019-02-06 RX ADMIN — METRONIDAZOLE 500 MG: 500 TABLET ORAL at 12:38

## 2019-02-06 RX ADMIN — LEVOTHYROXINE SODIUM 50 MCG: 50 TABLET ORAL at 06:11

## 2019-02-06 RX ADMIN — METRONIDAZOLE 500 MG: 500 TABLET ORAL at 09:32

## 2019-02-06 RX ADMIN — BUPROPION HYDROCHLORIDE 150 MG: 150 TABLET, EXTENDED RELEASE ORAL at 09:32

## 2019-02-07 LAB
BLOOD CULTURE, ROUTINE: NORMAL
CULTURE, BLOOD 2: NORMAL

## 2019-02-12 DIAGNOSIS — K61.1 PERIRECTAL ABSCESS: Primary | ICD-10-CM

## 2021-11-19 ENCOUNTER — HOSPITAL ENCOUNTER (EMERGENCY)
Age: 61
Discharge: HOME HEALTH | End: 2021-11-20
Payer: MEDICAID

## 2021-11-19 ENCOUNTER — APPOINTMENT (OUTPATIENT)
Dept: ULTRASOUND IMAGING | Age: 61
End: 2021-11-19
Payer: MEDICAID

## 2021-11-19 VITALS
DIASTOLIC BLOOD PRESSURE: 54 MMHG | RESPIRATION RATE: 18 BRPM | TEMPERATURE: 98.1 F | OXYGEN SATURATION: 100 % | HEIGHT: 60 IN | BODY MASS INDEX: 57.52 KG/M2 | SYSTOLIC BLOOD PRESSURE: 157 MMHG | WEIGHT: 293 LBS | HEART RATE: 90 BPM

## 2021-11-19 DIAGNOSIS — I87.8 VENOUS STASIS: ICD-10-CM

## 2021-11-19 DIAGNOSIS — M79.605 PAIN IN BOTH LOWER EXTREMITIES: Primary | ICD-10-CM

## 2021-11-19 DIAGNOSIS — N18.9 CHRONIC KIDNEY DISEASE, UNSPECIFIED CKD STAGE: ICD-10-CM

## 2021-11-19 DIAGNOSIS — M79.604 PAIN IN BOTH LOWER EXTREMITIES: Primary | ICD-10-CM

## 2021-11-19 DIAGNOSIS — L89.90 PRESSURE INJURY OF SKIN, UNSPECIFIED INJURY STAGE, UNSPECIFIED LOCATION: ICD-10-CM

## 2021-11-19 LAB
ALBUMIN SERPL-MCNC: 4.2 G/DL (ref 3.5–4.6)
ALP BLD-CCNC: 154 U/L (ref 40–130)
ALT SERPL-CCNC: 16 U/L (ref 0–33)
ANION GAP SERPL CALCULATED.3IONS-SCNC: 13 MEQ/L (ref 9–15)
AST SERPL-CCNC: 14 U/L (ref 0–35)
BASOPHILS ABSOLUTE: 0 K/UL (ref 0–0.2)
BASOPHILS RELATIVE PERCENT: 0.6 %
BILIRUB SERPL-MCNC: 0.5 MG/DL (ref 0.2–0.7)
BUN BLDV-MCNC: 25 MG/DL (ref 6–20)
CALCIUM SERPL-MCNC: 9.4 MG/DL (ref 8.5–9.9)
CHLORIDE BLD-SCNC: 104 MEQ/L (ref 95–107)
CO2: 22 MEQ/L (ref 20–31)
CREAT SERPL-MCNC: 0.96 MG/DL (ref 0.5–0.9)
EOSINOPHILS ABSOLUTE: 0 K/UL (ref 0–0.7)
EOSINOPHILS RELATIVE PERCENT: 0.6 %
GFR AFRICAN AMERICAN: >60
GFR NON-AFRICAN AMERICAN: 59.3
GLOBULIN: 3.2 G/DL (ref 2.3–3.5)
GLUCOSE BLD-MCNC: 115 MG/DL (ref 70–99)
HCT VFR BLD CALC: 41.3 % (ref 37–47)
HEMOGLOBIN: 13.5 G/DL (ref 12–16)
LIPASE: 67 U/L (ref 12–95)
LYMPHOCYTES ABSOLUTE: 1.5 K/UL (ref 1–4.8)
LYMPHOCYTES RELATIVE PERCENT: 21.6 %
MCH RBC QN AUTO: 25.9 PG (ref 27–31.3)
MCHC RBC AUTO-ENTMCNC: 32.6 % (ref 33–37)
MCV RBC AUTO: 79.5 FL (ref 82–100)
MONOCYTES ABSOLUTE: 0.5 K/UL (ref 0.2–0.8)
MONOCYTES RELATIVE PERCENT: 6.9 %
NEUTROPHILS ABSOLUTE: 4.8 K/UL (ref 1.4–6.5)
NEUTROPHILS RELATIVE PERCENT: 70.3 %
PDW BLD-RTO: 16.8 % (ref 11.5–14.5)
PLATELET # BLD: 202 K/UL (ref 130–400)
POTASSIUM SERPL-SCNC: 4.6 MEQ/L (ref 3.4–4.9)
RBC # BLD: 5.19 M/UL (ref 4.2–5.4)
SODIUM BLD-SCNC: 139 MEQ/L (ref 135–144)
TOTAL PROTEIN: 7.4 G/DL (ref 6.3–8)
WBC # BLD: 6.8 K/UL (ref 4.8–10.8)

## 2021-11-19 PROCEDURE — 36415 COLL VENOUS BLD VENIPUNCTURE: CPT

## 2021-11-19 PROCEDURE — 6360000002 HC RX W HCPCS: Performed by: PHYSICIAN ASSISTANT

## 2021-11-19 PROCEDURE — 2580000003 HC RX 258: Performed by: PHYSICIAN ASSISTANT

## 2021-11-19 PROCEDURE — 83690 ASSAY OF LIPASE: CPT

## 2021-11-19 PROCEDURE — 99285 EMERGENCY DEPT VISIT HI MDM: CPT

## 2021-11-19 PROCEDURE — 96375 TX/PRO/DX INJ NEW DRUG ADDON: CPT

## 2021-11-19 PROCEDURE — 85025 COMPLETE CBC W/AUTO DIFF WBC: CPT

## 2021-11-19 PROCEDURE — 93970 EXTREMITY STUDY: CPT

## 2021-11-19 PROCEDURE — 96374 THER/PROPH/DIAG INJ IV PUSH: CPT

## 2021-11-19 PROCEDURE — 80053 COMPREHEN METABOLIC PANEL: CPT

## 2021-11-19 RX ORDER — TRAMADOL HYDROCHLORIDE 50 MG/1
50 TABLET ORAL EVERY 6 HOURS PRN
Qty: 28 TABLET | Refills: 0 | Status: SHIPPED | OUTPATIENT
Start: 2021-11-19 | End: 2021-11-26

## 2021-11-19 RX ORDER — MORPHINE SULFATE 4 MG/ML
4 INJECTION, SOLUTION INTRAMUSCULAR; INTRAVENOUS ONCE
Status: COMPLETED | OUTPATIENT
Start: 2021-11-19 | End: 2021-11-19

## 2021-11-19 RX ORDER — 0.9 % SODIUM CHLORIDE 0.9 %
500 INTRAVENOUS SOLUTION INTRAVENOUS ONCE
Status: COMPLETED | OUTPATIENT
Start: 2021-11-19 | End: 2021-11-19

## 2021-11-19 RX ORDER — ONDANSETRON 2 MG/ML
4 INJECTION INTRAMUSCULAR; INTRAVENOUS ONCE
Status: COMPLETED | OUTPATIENT
Start: 2021-11-19 | End: 2021-11-19

## 2021-11-19 RX ADMIN — ONDANSETRON 4 MG: 2 INJECTION INTRAMUSCULAR; INTRAVENOUS at 19:39

## 2021-11-19 RX ADMIN — MORPHINE SULFATE 4 MG: 4 INJECTION INTRAVENOUS at 19:35

## 2021-11-19 RX ADMIN — SODIUM CHLORIDE 500 ML: 9 INJECTION, SOLUTION INTRAVENOUS at 19:57

## 2021-11-19 ASSESSMENT — PAIN DESCRIPTION - ONSET: ONSET: ON-GOING

## 2021-11-19 ASSESSMENT — ENCOUNTER SYMPTOMS
ANAL BLEEDING: 0
COUGH: 0
ABDOMINAL DISTENTION: 0
NAUSEA: 0
PHOTOPHOBIA: 0
EYE DISCHARGE: 0
APNEA: 0
VOICE CHANGE: 0
VOMITING: 0

## 2021-11-19 ASSESSMENT — PAIN SCALES - GENERAL
PAINLEVEL_OUTOF10: 7
PAINLEVEL_OUTOF10: 7
PAINLEVEL_OUTOF10: 10
PAINLEVEL_OUTOF10: 10
PAINLEVEL_OUTOF10: 7

## 2021-11-19 ASSESSMENT — PAIN DESCRIPTION - DESCRIPTORS
DESCRIPTORS: DULL;ACHING
DESCRIPTORS: ACHING;DULL

## 2021-11-19 ASSESSMENT — PAIN DESCRIPTION - LOCATION
LOCATION: GROIN
LOCATION: GROIN

## 2021-11-19 ASSESSMENT — PAIN DESCRIPTION - PAIN TYPE
TYPE: ACUTE PAIN
TYPE: ACUTE PAIN

## 2021-11-19 ASSESSMENT — PAIN DESCRIPTION - FREQUENCY: FREQUENCY: CONTINUOUS

## 2021-11-19 NOTE — ED TRIAGE NOTES
Pt presents to ER by EMS for leg pain since October 2020. She states she has been having difficulty with activities of daily living at home. Pt is having increased difficulty with walking and has been unable to make her pain management appointments because she cannot leave her home due to increased pain and difficulty with ambulation. Pt also has a wound to the left lower extremity that she has been treating with neosporin at home. Pt is hypertensive on arrival. She states she is compliant with her home medication regimen.

## 2021-11-20 NOTE — ED PROVIDER NOTES
[address] [facility] ED  eMERGENCY dEPARTMENT eNCOUnter      Pt Name: Curlie Meckel  MRN: [id number]  Urszulagfjung 1960  Date of evaluation: 11/19/2021  Provider: Stella Peters PA-C    CHIEF COMPLAINT     No chief complaint on file. HISTORY OF PRESENT ILLNESS   (Location/Symptom, Timing/Onset,Context/Setting, Quality, Duration, Modifying Factors, Severity)  Note limiting factors. Curlie Meckel is a 62 y.o. female who presents to the emergency department patient states she been having increasing difficulty ambulating she has bilateral leg pains x2 to 3 weeks has history of knee pain with joint injections over the past 2 years she notes she has been taking etodolac until recently. Has not had any in week has been unable to get to her new pain management doctor. She has been joint injections of her knees. She notes that she has had right-sided leg ulcer notes it was improving with conservative care including antibiotic ointment and dressings but notes worsened again. Patient notes she is not diabetic. She is on Trulicity for weight loss but states she has been having increasing weight. She has been trying to get her primary care doctor to assign home care for her she notes that she was contacted once and has been unable to receive any contact from [facility] home care since. Symptoms include pain right leg groin down leg she also has left leg pain she has chronic swelling chronic brawny edema patient denies fever chills nausea vomiting pain or burning with urination. She does note she has abdominal pain. Symptoms mild to moderate severity nothing proves worsen symptoms. Patient notes that she had an accident 2 years ago has had increasing pain since she also sees Dr. Sergey Nance neurosurgeon at [facility] to monitor her neck and spine. Last visit was a few months ago she is due again for visit in 1 year she states that she has moderate disease in her neck.   History of fusion lower spine    HPI    NursingNotes were reviewed. REVIEW OF SYSTEMS    (2-9 systems for level 4, 10 or more for level 5)     Review of Systems   Constitutional: Negative for activity change, appetite change, fever and unexpected weight change. HENT: Negative for ear discharge, nosebleeds and voice change. Eyes: Negative for photophobia and discharge. Respiratory: Negative for apnea and cough. Cardiovascular: Positive for leg swelling. Negative for chest pain. Gastrointestinal: Negative for abdominal distention, anal bleeding, nausea and vomiting. Endocrine: Negative for cold intolerance, heat intolerance and polyphagia. Genitourinary: Positive for frequency. Negative for genital sores. Musculoskeletal: Negative for joint swelling. Skin: Positive for wound. Negative for pallor. Allergic/Immunologic: Negative for immunocompromised state. Neurological: Negative for seizures and facial asymmetry. Hematological: Does not bruise/bleed easily. Psychiatric/Behavioral: Negative for behavioral problems, self-injury and sleep disturbance. All other systems reviewed and are negative. Except as noted above the remainder of the review of systems was reviewed and negative.        PAST MEDICAL HISTORY     Past Medical History:   Diagnosis Date    [facility]     Depression     Diabetes mellitus ([facility]     Hypertension     PTSD (post-traumatic stress disorder)     Thyroid disease          SURGICALHISTORY       Past Surgical History:   Procedure Laterality Date    BREAST LUMPECTOMY Right     CERVICAL LAMINECTOMY      C2-C6    INCISION AND DRAINAGE Left 1/7/2019    INCISION AND DRAINAGE LEFT PERIRECTAL  ABSCESS performed by Donna Lopez MD at [address] [place] [address]       Discharge Medication List as of 11/19/2021 10:28 PM      CONTINUE these medications which have NOT CHANGED    Details   Cholecalciferol (VITAMIN Running Out of Food in the Last Year: Not on file    Ran Out of Food in the Last Year: Not on file   Transportation Needs:     Lack of Transportation (Medical): Not on file    Lack of Transportation (Non-Medical): Not on file   Physical Activity:     Days of Exercise per Week: Not on file    Minutes of Exercise per Session: Not on file   Stress:     Feeling of Stress : Not on file   Social Connections:     Frequency of Communication with Friends and Family: Not on file    Frequency of Social Gatherings with Friends and Family: Not on file    Attends [employer]ism Services: Not on file    Active Member of [address] [place] [address] [employer] or Organizations: Not on file    Attends Club or Organization Meetings: Not on file    Marital Status: Not on file   Intimate Partner Violence:     Fear of Current or Ex-Partner: Not on file    Emotionally Abused: Not on file    Physically Abused: Not on file    Sexually Abused: Not on file   Housing Stability:     Unable to Pay for Housing in the Last Year: Not on file    Number of [place] in the Last Year: Not on file    Unstable Housing in the Last Year: Not on file       SCREENINGS   Ebola Virus Disease (EVD) Screening   Temp: 98.1 °F (36.7 °C)  CIWA Assessment  BP: (!) 157/54  Pulse: 90    PHYSICAL EXAM    (up to 7 for level 4, 8 or more for level 5)     ED Triage Vitals [11/19/21 1852]   BP Temp Temp Source Pulse Resp SpO2 Height Weight   (!) 216/87 98.1 °F (36.7 °C) Oral 94 18 98 % 5' (1.524 m) (!) 380 lb (172.4 kg)       Physical Exam  Vitals and nursing note reviewed. Constitutional:       General: She is not in acute distress. Appearance: She is well-developed. HENT:      Head: Normocephalic and atraumatic. Right Ear: Tympanic membrane and external ear normal.      Left Ear: Tympanic membrane and external ear normal.      Nose: Nose normal.      Mouth/Throat:      Mouth: Mucous membranes are moist.      Pharynx: No oropharyngeal exudate.    Eyes:      General: Right eye: No discharge. Left eye: No discharge. Extraocular Movements: Extraocular movements intact. Pupils: Pupils are equal, round, and reactive to light. Cardiovascular:      Rate and Rhythm: Normal rate and regular rhythm. Heart sounds: Normal heart sounds. Pulmonary:      Effort: Pulmonary effort is normal. No respiratory distress. Breath sounds: Normal breath sounds. No stridor. Abdominal:      General: Bowel sounds are normal. There is no distension. Palpations: Abdomen is soft. Tenderness: There is no abdominal tenderness. Musculoskeletal:         General: Tenderness present. Normal range of motion. Cervical back: Normal range of motion and neck supple. Right lower leg: Edema present. Left lower leg: Edema present. Comments: Positive Dread bilateral   Skin:     General: Skin is warm. Findings: Lesion present. Comments: 3 cm right mid shaft lower lateral skin ulcer with slight drainage   Neurological:      Mental Status: She is alert and oriented to person, place, and time. Psychiatric:         Mood and Affect: Mood normal.         RESULTS     EKG: All EKG's are interpreted by the Emergency Department Physician who either signs or Co-signsthis chart in the absence of a cardiologist.         RADIOLOGY:   Herbie Nageotte such as CT, Ultrasound and MRI are read by the radiologist. Plain radiographic images are visualized and preliminarily interpreted by the emergency physician with the below findings:         Interpretation per the Radiologist below, if available at the time ofthis note:    US DUP LOWER EXTREMITIES BILATERAL VENOUS   Final Result      NO DVT IDENTIFIED IN EITHER LOWER EXTREMITY, WITHIN THE LIMITS OF THE STUDY.                   ED BEDSIDE ULTRASOUND:   Performed by ED Physician - none    LABS:  Labs Reviewed   COMPREHENSIVE METABOLIC PANEL - Abnormal; Notable for the following components:       Result Value Glucose 115 (*)     BUN 25 (*)     CREATININE 0.96 (*)     GFR Non- 59.3 (*)     Alkaline Phosphatase 154 (*)     All other components within normal limits   CBC WITH AUTO DIFFERENTIAL - Abnormal; Notable for the following components:    MCV 79.5 (*)     MCH 25.9 (*)     MCHC 32.6 (*)     RDW 16.8 (*)     All other components within normal limits   LIPASE   URINE RT REFLEX TO CULTURE       All other labs were within normal range or not returned as of this dictation. EMERGENCY DEPARTMENT COURSE and DIFFERENTIAL DIAGNOSIS/MDM:   Vitals:    Vitals:    11/19/21 1857 11/19/21 1935 11/19/21 2045 11/19/21 2236   BP: (!) 207/99 (!) 144/62 (!) 157/54    Pulse:  88 91 90   Resp:  18 18    Temp:       TempSrc:       SpO2:  98% 97% 100%   Weight:       Height:                MDM  Number of Diagnoses or Management Options  Chronic kidney disease, unspecified CKD stage  Pain in both lower extremities  Pressure injury of skin, unspecified injury stage, unspecified location  Venous stasis  Diagnosis management comments: [facility] social work states she has had 1 contact with them they have not returned her calls. We had Negrito Lyman care coordinator evaluate patient and recommend home care for patient. Order [facility]. Negative for DVT patient has history of venous stasis as well as pressure ulcer will switch patient to mupirocin ointment patient noted to be standing at bedside in emergency room. Reviewed labs patient's laboratory work-up notable for renal insufficiency versus CKD which is been evaluated [facility] in the past.  Will refer patient to primary care as well as orthopedics group and prescribe small amount of pain medication. Care coordination has sent written request for [facility].        Amount and/or Complexity of Data Reviewed  Clinical lab tests: reviewed and ordered  Tests in the radiology section of CPT®: ordered and reviewed  Review and summarize past medical records: yes        CRITICAL CARE TIME       CONSULTS:  IP CONSULT TO HOME CARE NEEDS    PROCEDURES:  Unless otherwise noted below, none     Procedures    FINAL IMPRESSION      1. Pain in both lower extremities    2. Venous stasis    3. Pressure injury of skin, unspecified injury stage, unspecified location    4. Chronic kidney disease, unspecified CKD stage          DISPOSITION/PLAN   DISPOSITION        PATIENT REFERRED TO:  [facility] [address] [facility] [address] [facility] [facility]  [phone]  Call in 2 days      Mirela Ash MD  [facility] [address] [facility] [address] [phone]    Call in 2 days      Roger Anguiano DO  [address] [facility] [phone]    Call in 2 days      orthopedics    Call in 1 day      [place] [address] [address]  [facility] [phone]    Call in 2 days        DISCHARGE MEDICATIONS:  Discharge Medication List as of 11/19/2021 10:28 PM      START taking these medications    Details   traMADol (ULTRAM) 50 MG tablet Take 1 tablet by mouth every 6 hours as needed for Pain for up to 7 days. Intended supply: 7 days.  Take lowest dose possible to manage pain, Disp-28 tablet, R-0Print                (Please note that portions of this note were completed with a voice recognition program.  Efforts were made to edit the dictations but occasionally words are mis-transcribed.)    Guido Riley PA-C (electronically signed)  Attending Emergency Physician       Guido Riley PA-C  11/25/21 0140

## 2021-11-20 NOTE — ED NOTES
[facility] arrived for patient transport. Report provided to medics. Pt set with all belongings, including walker.      Lisseth Dobbins RN  11/20/21 0000

## 2022-03-14 ENCOUNTER — APPOINTMENT (OUTPATIENT)
Dept: GENERAL RADIOLOGY | Age: 62
DRG: 364 | End: 2022-03-14
Payer: MEDICAID

## 2022-03-14 ENCOUNTER — APPOINTMENT (OUTPATIENT)
Dept: ULTRASOUND IMAGING | Age: 62
DRG: 364 | End: 2022-03-14
Payer: MEDICAID

## 2022-03-14 ENCOUNTER — HOSPITAL ENCOUNTER (INPATIENT)
Age: 62
LOS: 8 days | Discharge: NURSING, REHAB OR LONG-TERM CARE | DRG: 364 | End: 2022-03-22
Attending: STUDENT IN AN ORGANIZED HEALTH CARE EDUCATION/TRAINING PROGRAM | Admitting: INTERNAL MEDICINE
Payer: MEDICAID

## 2022-03-14 DIAGNOSIS — L03.116 CELLULITIS AND ABSCESS OF LEFT LOWER EXTREMITY: Primary | ICD-10-CM

## 2022-03-14 DIAGNOSIS — L02.416 CELLULITIS AND ABSCESS OF LEFT LOWER EXTREMITY: Primary | ICD-10-CM

## 2022-03-14 DIAGNOSIS — A41.9 SEPTICEMIA (HCC): ICD-10-CM

## 2022-03-14 DIAGNOSIS — L02.91 ABSCESS: ICD-10-CM

## 2022-03-14 LAB
ALBUMIN SERPL-MCNC: 3.6 G/DL (ref 3.5–4.6)
ALP BLD-CCNC: 144 U/L (ref 40–130)
ALT SERPL-CCNC: 17 U/L (ref 0–33)
ANION GAP SERPL CALCULATED.3IONS-SCNC: 18 MEQ/L (ref 9–15)
AST SERPL-CCNC: 16 U/L (ref 0–35)
BASOPHILS ABSOLUTE: 0 K/UL (ref 0–0.2)
BASOPHILS RELATIVE PERCENT: 0.2 %
BILIRUB SERPL-MCNC: 0.9 MG/DL (ref 0.2–0.7)
BUN BLDV-MCNC: 24 MG/DL (ref 8–23)
C-REACTIVE PROTEIN: 254.9 MG/L (ref 0–5)
CALCIUM SERPL-MCNC: 9 MG/DL (ref 8.5–9.9)
CHLORIDE BLD-SCNC: 101 MEQ/L (ref 95–107)
CO2: 16 MEQ/L (ref 20–31)
CREAT SERPL-MCNC: 1.28 MG/DL (ref 0.5–0.9)
EOSINOPHILS ABSOLUTE: 0 K/UL (ref 0–0.7)
EOSINOPHILS RELATIVE PERCENT: 0 %
GFR AFRICAN AMERICAN: 51.4
GFR NON-AFRICAN AMERICAN: 42.5
GLOBULIN: 3.5 G/DL (ref 2.3–3.5)
GLUCOSE BLD-MCNC: 102 MG/DL (ref 70–99)
GLUCOSE BLD-MCNC: 136 MG/DL (ref 70–99)
HCT VFR BLD CALC: 35.8 % (ref 37–47)
HEMOGLOBIN: 11.1 G/DL (ref 12–16)
LYMPHOCYTES ABSOLUTE: 0.7 K/UL (ref 1–4.8)
LYMPHOCYTES RELATIVE PERCENT: 5.4 %
MCH RBC QN AUTO: 24 PG (ref 27–31.3)
MCHC RBC AUTO-ENTMCNC: 30.9 % (ref 33–37)
MCV RBC AUTO: 77.5 FL (ref 82–100)
MONOCYTES ABSOLUTE: 0.6 K/UL (ref 0.2–0.8)
MONOCYTES RELATIVE PERCENT: 4.5 %
NEUTROPHILS ABSOLUTE: 11.8 K/UL (ref 1.4–6.5)
NEUTROPHILS RELATIVE PERCENT: 89.9 %
PDW BLD-RTO: 18.1 % (ref 11.5–14.5)
PERFORMED ON: ABNORMAL
PLATELET # BLD: 238 K/UL (ref 130–400)
POTASSIUM SERPL-SCNC: 4.1 MEQ/L (ref 3.4–4.9)
PROCALCITONIN: 2.49 NG/ML (ref 0–0.15)
RBC # BLD: 4.62 M/UL (ref 4.2–5.4)
SEDIMENTATION RATE, ERYTHROCYTE: 94 MM (ref 0–30)
SODIUM BLD-SCNC: 135 MEQ/L (ref 135–144)
TOTAL CK: 24 U/L (ref 0–170)
TOTAL PROTEIN: 7.1 G/DL (ref 6.3–8)
WBC # BLD: 13.1 K/UL (ref 4.8–10.8)

## 2022-03-14 PROCEDURE — 1210000000 HC MED SURG R&B

## 2022-03-14 PROCEDURE — 85652 RBC SED RATE AUTOMATED: CPT

## 2022-03-14 PROCEDURE — 2580000003 HC RX 258: Performed by: STUDENT IN AN ORGANIZED HEALTH CARE EDUCATION/TRAINING PROGRAM

## 2022-03-14 PROCEDURE — 99284 EMERGENCY DEPT VISIT MOD MDM: CPT

## 2022-03-14 PROCEDURE — 86140 C-REACTIVE PROTEIN: CPT

## 2022-03-14 PROCEDURE — 80053 COMPREHEN METABOLIC PANEL: CPT

## 2022-03-14 PROCEDURE — 36415 COLL VENOUS BLD VENIPUNCTURE: CPT

## 2022-03-14 PROCEDURE — 6370000000 HC RX 637 (ALT 250 FOR IP): Performed by: STUDENT IN AN ORGANIZED HEALTH CARE EDUCATION/TRAINING PROGRAM

## 2022-03-14 PROCEDURE — 85025 COMPLETE CBC W/AUTO DIFF WBC: CPT

## 2022-03-14 PROCEDURE — 82550 ASSAY OF CK (CPK): CPT

## 2022-03-14 PROCEDURE — 96375 TX/PRO/DX INJ NEW DRUG ADDON: CPT

## 2022-03-14 PROCEDURE — 87040 BLOOD CULTURE FOR BACTERIA: CPT

## 2022-03-14 PROCEDURE — 84145 PROCALCITONIN (PCT): CPT

## 2022-03-14 PROCEDURE — 96376 TX/PRO/DX INJ SAME DRUG ADON: CPT

## 2022-03-14 PROCEDURE — 2580000003 HC RX 258: Performed by: INTERNAL MEDICINE

## 2022-03-14 PROCEDURE — 76999 ECHO EXAMINATION PROCEDURE: CPT

## 2022-03-14 PROCEDURE — 96374 THER/PROPH/DIAG INJ IV PUSH: CPT

## 2022-03-14 PROCEDURE — 2500000003 HC RX 250 WO HCPCS: Performed by: STUDENT IN AN ORGANIZED HEALTH CARE EDUCATION/TRAINING PROGRAM

## 2022-03-14 PROCEDURE — 73590 X-RAY EXAM OF LOWER LEG: CPT

## 2022-03-14 PROCEDURE — 6360000002 HC RX W HCPCS: Performed by: STUDENT IN AN ORGANIZED HEALTH CARE EDUCATION/TRAINING PROGRAM

## 2022-03-14 PROCEDURE — 93971 EXTREMITY STUDY: CPT

## 2022-03-14 RX ORDER — SODIUM CHLORIDE 0.9 % (FLUSH) 0.9 %
5-40 SYRINGE (ML) INJECTION PRN
Status: DISCONTINUED | OUTPATIENT
Start: 2022-03-14 | End: 2022-03-23 | Stop reason: HOSPADM

## 2022-03-14 RX ORDER — TOPIRAMATE 25 MG/1
25 TABLET ORAL 2 TIMES DAILY
COMMUNITY
Start: 2022-03-07

## 2022-03-14 RX ORDER — FENTANYL CITRATE 50 UG/ML
100 INJECTION, SOLUTION INTRAMUSCULAR; INTRAVENOUS ONCE
Status: COMPLETED | OUTPATIENT
Start: 2022-03-14 | End: 2022-03-14

## 2022-03-14 RX ORDER — ACETAMINOPHEN 500 MG
1000 TABLET ORAL ONCE
Status: COMPLETED | OUTPATIENT
Start: 2022-03-14 | End: 2022-03-14

## 2022-03-14 RX ORDER — LEVOTHYROXINE SODIUM 0.05 MG/1
50 TABLET ORAL DAILY
Status: DISCONTINUED | OUTPATIENT
Start: 2022-03-15 | End: 2022-03-23 | Stop reason: HOSPADM

## 2022-03-14 RX ORDER — DULAGLUTIDE 3 MG/.5ML
3 INJECTION, SOLUTION SUBCUTANEOUS WEEKLY
COMMUNITY
Start: 2022-02-19

## 2022-03-14 RX ORDER — ACETAMINOPHEN 650 MG/1
650 SUPPOSITORY RECTAL EVERY 6 HOURS PRN
Status: DISCONTINUED | OUTPATIENT
Start: 2022-03-14 | End: 2022-03-23 | Stop reason: HOSPADM

## 2022-03-14 RX ORDER — SODIUM CHLORIDE 9 MG/ML
25 INJECTION, SOLUTION INTRAVENOUS PRN
Status: DISCONTINUED | OUTPATIENT
Start: 2022-03-14 | End: 2022-03-23 | Stop reason: HOSPADM

## 2022-03-14 RX ORDER — CLINDAMYCIN PHOSPHATE 900 MG/50ML
900 INJECTION INTRAVENOUS EVERY 6 HOURS
Status: DISCONTINUED | OUTPATIENT
Start: 2022-03-14 | End: 2022-03-20

## 2022-03-14 RX ORDER — 0.9 % SODIUM CHLORIDE 0.9 %
1000 INTRAVENOUS SOLUTION INTRAVENOUS ONCE
Status: COMPLETED | OUTPATIENT
Start: 2022-03-14 | End: 2022-03-14

## 2022-03-14 RX ORDER — ONDANSETRON 2 MG/ML
4 INJECTION INTRAMUSCULAR; INTRAVENOUS EVERY 6 HOURS PRN
Status: DISCONTINUED | OUTPATIENT
Start: 2022-03-14 | End: 2022-03-23 | Stop reason: HOSPADM

## 2022-03-14 RX ORDER — DEXTROSE MONOHYDRATE 25 G/50ML
12.5 INJECTION, SOLUTION INTRAVENOUS PRN
Status: DISCONTINUED | OUTPATIENT
Start: 2022-03-14 | End: 2022-03-14 | Stop reason: ALTCHOICE

## 2022-03-14 RX ORDER — ONDANSETRON 4 MG/1
4 TABLET, ORALLY DISINTEGRATING ORAL EVERY 8 HOURS PRN
Status: DISCONTINUED | OUTPATIENT
Start: 2022-03-14 | End: 2022-03-23 | Stop reason: HOSPADM

## 2022-03-14 RX ORDER — SODIUM CHLORIDE 0.9 % (FLUSH) 0.9 %
5-40 SYRINGE (ML) INJECTION EVERY 12 HOURS SCHEDULED
Status: DISCONTINUED | OUTPATIENT
Start: 2022-03-14 | End: 2022-03-23 | Stop reason: HOSPADM

## 2022-03-14 RX ORDER — CLINDAMYCIN PHOSPHATE 900 MG/50ML
900 INJECTION INTRAVENOUS ONCE
Status: COMPLETED | OUTPATIENT
Start: 2022-03-14 | End: 2022-03-14

## 2022-03-14 RX ORDER — POLYETHYLENE GLYCOL 3350 17 G/17G
17 POWDER, FOR SOLUTION ORAL DAILY PRN
Status: DISCONTINUED | OUTPATIENT
Start: 2022-03-14 | End: 2022-03-23 | Stop reason: HOSPADM

## 2022-03-14 RX ORDER — KETOROLAC TROMETHAMINE 30 MG/ML
30 INJECTION, SOLUTION INTRAMUSCULAR; INTRAVENOUS ONCE
Status: COMPLETED | OUTPATIENT
Start: 2022-03-14 | End: 2022-03-14

## 2022-03-14 RX ORDER — MORPHINE SULFATE 2 MG/ML
2 INJECTION, SOLUTION INTRAMUSCULAR; INTRAVENOUS
Status: DISCONTINUED | OUTPATIENT
Start: 2022-03-14 | End: 2022-03-23 | Stop reason: HOSPADM

## 2022-03-14 RX ORDER — NICOTINE POLACRILEX 4 MG
15 LOZENGE BUCCAL PRN
Status: DISCONTINUED | OUTPATIENT
Start: 2022-03-14 | End: 2022-03-23 | Stop reason: HOSPADM

## 2022-03-14 RX ORDER — LISINOPRIL 10 MG/1
10 TABLET ORAL 2 TIMES DAILY
COMMUNITY
Start: 2022-02-18

## 2022-03-14 RX ORDER — SODIUM CHLORIDE, SODIUM LACTATE, POTASSIUM CHLORIDE, CALCIUM CHLORIDE 600; 310; 30; 20 MG/100ML; MG/100ML; MG/100ML; MG/100ML
INJECTION, SOLUTION INTRAVENOUS CONTINUOUS
Status: DISPENSED | OUTPATIENT
Start: 2022-03-14 | End: 2022-03-15

## 2022-03-14 RX ORDER — DEXTROSE MONOHYDRATE 50 MG/ML
100 INJECTION, SOLUTION INTRAVENOUS PRN
Status: DISCONTINUED | OUTPATIENT
Start: 2022-03-14 | End: 2022-03-23 | Stop reason: HOSPADM

## 2022-03-14 RX ORDER — ACETAMINOPHEN 325 MG/1
650 TABLET ORAL EVERY 6 HOURS PRN
Status: DISCONTINUED | OUTPATIENT
Start: 2022-03-14 | End: 2022-03-23 | Stop reason: HOSPADM

## 2022-03-14 RX ADMIN — FENTANYL CITRATE 100 MCG: 50 INJECTION INTRAMUSCULAR; INTRAVENOUS at 17:07

## 2022-03-14 RX ADMIN — Medication 10 ML: at 21:59

## 2022-03-14 RX ADMIN — SODIUM CHLORIDE 1000 ML: 9 INJECTION, SOLUTION INTRAVENOUS at 19:19

## 2022-03-14 RX ADMIN — FENTANYL CITRATE 100 MCG: 50 INJECTION INTRAMUSCULAR; INTRAVENOUS at 19:18

## 2022-03-14 RX ADMIN — KETOROLAC TROMETHAMINE 30 MG: 30 INJECTION, SOLUTION INTRAMUSCULAR at 16:47

## 2022-03-14 RX ADMIN — CLINDAMYCIN PHOSPHATE 900 MG: 900 INJECTION, SOLUTION INTRAVENOUS at 17:54

## 2022-03-14 RX ADMIN — ACETAMINOPHEN 1000 MG: 500 TABLET ORAL at 19:18

## 2022-03-14 RX ADMIN — SODIUM CHLORIDE, POTASSIUM CHLORIDE, SODIUM LACTATE AND CALCIUM CHLORIDE: 600; 310; 30; 20 INJECTION, SOLUTION INTRAVENOUS at 21:58

## 2022-03-14 RX ADMIN — SODIUM CHLORIDE 1000 ML: 9 INJECTION, SOLUTION INTRAVENOUS at 19:15

## 2022-03-14 ASSESSMENT — ENCOUNTER SYMPTOMS
TROUBLE SWALLOWING: 0
SINUS PRESSURE: 0
VOMITING: 0
ABDOMINAL PAIN: 0
CHEST TIGHTNESS: 0
BACK PAIN: 0
SHORTNESS OF BREATH: 0
DIARRHEA: 0
COUGH: 0

## 2022-03-14 ASSESSMENT — PAIN SCALES - GENERAL
PAINLEVEL_OUTOF10: 8
PAINLEVEL_OUTOF10: 4
PAINLEVEL_OUTOF10: 8
PAINLEVEL_OUTOF10: 7
PAINLEVEL_OUTOF10: 8

## 2022-03-14 ASSESSMENT — PAIN - FUNCTIONAL ASSESSMENT: PAIN_FUNCTIONAL_ASSESSMENT: 0-10

## 2022-03-14 NOTE — ED TRIAGE NOTES
Shoshana Ashley January 24th and developed an abscess at left knee, went on antibiotics and resolved but recently became painful again and developed open area with purulent drainage, now with clear fluid \"shooting out\" as well, last evening began feeling not well and was concerned for sepsis again, leg is swollen, erythematous, painful, warm

## 2022-03-14 NOTE — H&P
Hospital Medicine  History and Physical    Patient:  Nehemias Sparks  MRN: [id number]    CHIEF COMPLAINT:    Chief Complaint   Patient presents with    Leg Pain       History Obtained From:  Patient, EMR  Primary Care Physician: Lisa Schneider    HISTORY OF PRESENT ILLNESS:   The patient is a 62 y.o. female with PMH of HTN, DM2, CKD3, morbid obesity, chronic lymphedema of the LE who presented with the above CC. Patient initially developed left knee pain,swelling and redness in February after suffering a fall in late January, treated with a 10 day course if Doxycycline with some clinical improvement per report, started draining purulent fluid spontaneously after she completed the antibiotic course, yesterday she started feeling sick with fevers, chills and dizziness, seen by homecare nurse today, sent to ED to be evaluated, diagnosed with cellulitis of LE extremity, doppler u/s showed a complex fluid collection involving subcutaneous tissues of left knee concerning for abscess, IV Clindamycin, Toradol and Fentanyl were administered and patient was admitted for further management. Past Medical History:      Diagnosis Date    B[facility] depression ([facility] [address] [facility]     Depression     Diabetes mellitus ([facility] [facility]     Hypertension     PTSD (post-traumatic stress disorder)     Thyroid disease        Past Surgical History:      Procedure Laterality Date    BREAST LUMPECTOMY Right     CERVICAL LAMINECTOMY      C2-C6    INCISION AND DRAINAGE Left 1/7/2019    INCISION AND DRAINAGE LEFT PERIRECTAL  ABSCESS performed by Hudson Beckford MD at [address] [place] [address]         Medications Prior to Admission:    Prior to Admission medications    Medication Sig Start Date End Date Taking? Authorizing Provider   mupirocin (BACTROBAN) 2 % ointment Apply topically 3 times daily Apply topically 3 times daily.  11/19/21   Booker Olivera PA-C   buPROPion (WELLBUTRIN XL) 150 MG extended release tablet Take 1 tablet by mouth daily 2/6/19   Richa Hazel MD   traZODone (DESYREL) 100 MG tablet Take 1 tablet by mouth nightly 2/6/19   Richa Hazel MD   lurasidone (LATUDA) 60 MG TABS tablet Take 1 tablet by mouth daily 2/6/19   Richa Hazel MD   prazosin (MINIPRESS) 1 MG capsule Take 1 capsule by mouth 2 times daily 2/6/19   Richa Hzael MD   losartan (COZAAR) 100 MG tablet Take 100 mg by mouth daily    Historical Provider, MD   linagliptin (TRADJENTA) 5 MG tablet Take 1 tablet by mouth daily 1/17/19   Marty East MD   amLODIPine (NORVASC) 5 MG tablet Take 1 tablet by mouth 2 times daily 1/16/19   Marty East MD   docusate sodium (COLACE, DULCOLAX) 100 MG CAPS Take 100 mg by mouth 2 times daily 1/16/19   Marty East MD   Cholecalciferol (VITAMIN D) 2000 units CAPS capsule Take by mouth    Historical Provider, MD   aspirin 81 MG tablet Take 81 mg by mouth daily    Historical Provider, MD   levothyroxine (SYNTHROID) 25 MCG tablet Take 50 mcg by mouth Daily     Historical Provider, MD       Allergies:  Bactrim [sulfamethoxazole-trimethoprim], Penicillins, Prednisone, and Vancomycin    Social History:   TOBACCO:   reports that she has never smoked. She has never used smokeless tobacco.  ETOH:   reports current alcohol use. Family History:       Problem Relation Age of Onset    Bipolar Disorder Mother     Bipolar Disorder Sister     Bipolar Disorder Brother        REVIEW OF SYSTEMS:  Ten systems reviewed and negative except for stated in HPI    Physical Exam:    Vitals: BP (!) 146/60   Pulse 95   Temp 99.9 °F (37.7 °C) (Oral)   Resp 20   Ht 5' (1.524 m)   Wt (!) 388 lb (176 kg)   SpO2 97%   BMI 75.78 kg/m²     General appearance: alert, cooperative  Skin: erythema of the left LE present  HEENT: Head: Normocephalic, no lesions, without obvious abnormality. Eye: Normal external eye, conjunctiva, lids cornea, MARITO.   Neck: supple, symmetrical, trachea midline  Lungs: clear to auscultation bilaterally  Heart: regular rate and rhythm and S1, S2 normal  Abdomen: soft, BS active  Extremities: left LE edema and erythema with induration near the left knee area, chronic lymphedema of the LE bilaterally, right leg covered with dressing  Neurologic: Mental status: Alert, oriented, thought content appropriate     Recent Labs     03/14/22  1630   WBC 13.1*   HGB 11.1*        Recent Labs     03/14/22  1630      K 4.1      CO2 16*   BUN 24*   CREATININE 1.28*   GLUCOSE 136*   AST 16   ALT 17   BILITOT 0.9*   ALKPHOS 144*     Troponin T: No results for input(s): TROPONINI in the last 72 hours. ABGs: No results found for: PHART, PO2ART, [id number]  INR: No results for input(s): INR in the last 72 hours. URINALYSIS:No results for input(s): NITRITE, COLORU, PHUR, LABCAST, WBCUA, RBCUA, MUCUS, TRICHOMONAS, YEAST, BACTERIA, CLARITYU, SPECGRAV, LEUKOCYTESUR, UROBILINOGEN, BILIRUBINUR, BLOODU, GLUCOSEU, AMORPHOUS in the last 72 hours. Invalid input(s): Orlan Homans  -----------------------------------------------------------------   US DUP LOWER EXTREMITY LEFT YVETTE    Result Date: 3/14/2022  EXAMINATION: US SOFT TISSUE LIMITED AREA, US DUP LOWER EXTREMITY LEFT YVETTE COMPARISON:None CLINICAL HISTORY:  over left knee scab ? abscess  FINDINGS:Targeted ultrasound scans of the left anterior knee. Significant subcutaneous edema with a 6.7 x 7.5 x 1.9 cm complex hypoechoic area lateral to the skin defect on the left knee that is compatible with abscess or complex hematoma. 7.5 x 6.7 x 1.9 cm complex fluid collection involving subcutaneous tissues of left knee consistent with abscess or hematoma  . EXAMINATION: US SOFT TISSUE LIMITED AREA, US DUP LOWER EXTREMITY LEFT YVETTE DATE AND TIME:3/14/2022 5:02 PM CLINICAL HISTORY: Leg pain. Leg swelling. Thrombophlebitis, possible.  Possible superficial and deep vein thrombosis, shortness of breath, dyspnea and edema   over left knee scab ? abscess COMPARISON: None TECHNIQUE: The lower extremity veins were evaluated with color doppler, gray scale imaging and spectral analysis while using compression and augmentation when possible. FINDINGS: Evaluation of the left lower extremity from the thigh to the knee shows normal phasic flow, normal augmentation of the Doppler signal, and normal compression of the deep veins. There is no sonographic evidence for acute deep venous thrombosis from the left groin to the popliteal region. IMPRESSION:NEGATIVE FOR ACUTE DVT OF THE LEFT LOWER EXTREMITY FROM THE  GROIN TO THE KNEE.      US SOFT TISSUE LIMITED AREA    Result Date: 3/14/2022  EXAMINATION: US SOFT TISSUE LIMITED AREA, US DUP LOWER EXTREMITY LEFT YVETTE COMPARISON:None CLINICAL HISTORY:  over left knee scab ? abscess  FINDINGS:Targeted ultrasound scans of the left anterior knee. Significant subcutaneous edema with a 6.7 x 7.5 x 1.9 cm complex hypoechoic area lateral to the skin defect on the left knee that is compatible with abscess or complex hematoma. 7.5 x 6.7 x 1.9 cm complex fluid collection involving subcutaneous tissues of left knee consistent with abscess or hematoma  . EXAMINATION: US SOFT TISSUE LIMITED AREA, US DUP LOWER EXTREMITY LEFT YVETTE DATE AND TIME:3/14/2022 5:02 PM CLINICAL HISTORY: Leg pain. Leg swelling. Thrombophlebitis, possible. Possible superficial and deep vein thrombosis, shortness of breath, dyspnea and edema   over left knee scab ? abscess       COMPARISON: None TECHNIQUE: The lower extremity veins were evaluated with color doppler, gray scale imaging and spectral analysis while using compression and augmentation when possible. FINDINGS: Evaluation of the left lower extremity from the thigh to the knee shows normal phasic flow, normal augmentation of the Doppler signal, and normal compression of the deep veins. There is no sonographic evidence for acute deep venous thrombosis from the left groin to the popliteal region. IMPRESSION:NEGATIVE FOR ACUTE DVT OF THE LEFT LOWER EXTREMITY FROM THE  GROIN TO THE KNEE.             Assessment and Plan     62 y.o. female with PMH of HTN, DM2, CKD3, morbid obesity, chronic lymphedema of the LE who presented with:    Purulent cellulitis of LLE with abscess  - failed outpatient antibiotic therapy  - doppler u/s showed a complex fluid collection involving subcutaneous tissues of left knee concerning for abscess,   - continue IV Clindamycin,   - ID and ortho consulted for further management    DM2 with hyperglycemia  - ISS, hypoglycemia protocol    CKD3  - monitor renal function            Dioni Murphy MD, MD  Admitting Hospitalist

## 2022-03-14 NOTE — ED NOTES
All blood work sent to the lab, medicated for pain and transport to ultrasound at this time     Keenan Salas, [facility]  03/14/22 2999

## 2022-03-14 NOTE — ED PROVIDER NOTES
[facility]  eMERGENCY dEPARTMENT eNCOUnter      Pt Name: Marinda Felty  MRN: [id number]  [place] 1960  Date of evaluation: 3/14/2022  Provider: Padilla Dumont, [address] [place] [address]       Chief Complaint   Patient presents with    Leg Pain         HISTORY OF PRESENT ILLNESS   (Location/Symptom, Timing/Onset,Context/Setting, Quality, Duration, Modifying Factors, Severity)  Note limiting factors. Marinda Felty is a 62 y.o. female who presents to the emergency department with c/o left leg redness. A \"scab at the left knee. \"  Upon further questioning the patient states it started out like a little pimple around February 24. Upon further questioning the patient states at the beginning of January she had redness to her leg, was prescribed doxycycline and it completely went away. Patient states that little purple area redness began spreading went down her whole leg and then started going up her thigh over the last 2 to 3 weeks. Patient states 2 to 3 days ago it began squirting out blood mixed with pus and she used an entire oral toilet paper wiping get up and squeezing the pus out. Patient denies any fever or chills. Patient denies vomiting or diarrhea. Patient states that the redness is worse than the original redness that happened in January and she thinks that she has cellulitis. Patient complains of dull achy pain in the leg is more swollen than the right leg and thigh. Touch or movement makes it worse. The history is provided by the patient. NursingNotes were reviewed. REVIEW OF SYSTEMS    (2-9 systems for level 4, 10 or more for level 5)     Review of Systems   Constitutional: Negative for activity change, appetite change, chills, fever and unexpected weight change. HENT: Negative for drooling, ear pain, nosebleeds, sinus pressure and trouble swallowing. Respiratory: Negative for cough, chest tightness and shortness of breath.     Cardiovascular: Positive for leg swelling. Negative for chest pain. Gastrointestinal: Negative for abdominal pain, diarrhea and vomiting. Endocrine: Negative for polydipsia and polyphagia. Genitourinary: Negative for dysuria, flank pain and frequency. Musculoskeletal: Negative for back pain and myalgias. Skin: Positive for rash. Negative for pallor. Neurological: Negative for syncope, weakness and headaches. Hematological: Does not bruise/bleed easily. All other systems reviewed and are negative. Except as noted above the remainder of the review of systems was reviewed and negative.        PAST MEDICAL HISTORY     Past Medical History:   Diagnosis Date    Bipolar depression ([facility] [address] [facility]     Depression     Diabetes mellitus ([facility] [facility]     Hypertension     PTSD (post-traumatic stress disorder)     Thyroid disease          SURGICALHISTORY       Past Surgical History:   Procedure Laterality Date    BREAST LUMPECTOMY Right     CERVICAL LAMINECTOMY      C2-C6    INCISION AND DRAINAGE Left 1/7/2019    INCISION AND DRAINAGE LEFT PERIRECTAL  ABSCESS performed by Roxy Vinson MD at [address]       Previous Medications    AMLODIPINE (NORVASC) 5 MG TABLET    Take 1 tablet by mouth 2 times daily    ASPIRIN 81 MG TABLET    Take 81 mg by mouth daily    BUPROPION (WELLBUTRIN XL) 150 MG EXTENDED RELEASE TABLET    Take 1 tablet by mouth daily    CHOLECALCIFEROL (VITAMIN D) 2000 UNITS CAPS CAPSULE    Take by mouth    DOCUSATE SODIUM (COLACE, DULCOLAX) 100 MG CAPS    Take 100 mg by mouth 2 times daily    LEVOTHYROXINE (SYNTHROID) 25 MCG TABLET    Take 50 mcg by mouth Daily     LINAGLIPTIN (TRADJENTA) 5 MG TABLET    Take 1 tablet by mouth daily    LOSARTAN (COZAAR) 100 MG TABLET    Take 100 mg by mouth daily    LURASIDONE (LATUDA) 60 MG TABS TABLET    Take 1 tablet by mouth daily    MUPIROCIN (BACTROBAN) 2 % OINTMENT    Apply topically 3 times daily Apply topically 3 times daily. PRAZOSIN (MINIPRESS) 1 MG CAPSULE    Take 1 capsule by mouth 2 times daily    TRAZODONE (DESYREL) 100 MG TABLET    Take 1 tablet by mouth nightly       ALLERGIES     Bactrim [sulfamethoxazole-trimethoprim], Penicillins, Prednisone, and Vancomycin    FAMILY HISTORY       Family History   Problem Relation Age of Onset    Bipolar Disorder Mother     Bipolar Disorder Sister     Bipolar Disorder Brother           SOCIAL HISTORY       Social History     Socioeconomic History    Marital status:      Spouse name: None    Number of children: None    Years of education: None    Highest education level: None   Occupational History    None   Tobacco Use    Smoking status: Never Smoker    Smokeless tobacco: Never Used   Substance and Sexual Activity    Alcohol use: Yes     Comment: occassionally    Drug use: No    Sexual activity: None   Other Topics Concern    None   Social History Narrative    None     Social Determinants of Health     Financial Resource Strain:     Difficulty of Paying Living Expenses: Not on file   Food Insecurity:     Worried About Running Out of Food in the Last Year: Not on file    Savage of Food in the Last Year: Not on file   Transportation Needs:     Lack of Transportation (Medical): Not on file    Lack of Transportation (Non-Medical):  Not on file   Physical Activity:     Days of Exercise per Week: Not on file    Minutes of Exercise per Session: Not on file   Stress:     Feeling of Stress : Not on file   Social Connections:     Frequency of Communication with Friends and Family: Not on file    Frequency of Social Gatherings with Friends and Family: Not on file    Attends Congregational Services: Not on file    Active Member of Clubs or Organizations: Not on file    Attends Club or Organization Meetings: Not on file    Marital Status: Not on file   Intimate Partner Violence:     Fear of Current or Ex-Partner: Not on file    Emotionally Abused: Not on file    Physically Abused: Not on file    Sexually Abused: Not on file   Housing Stability:     Unable to Pay for Housing in the Last Year: Not on file    Number of [place] in the Last Year: Not on file    Unstable Housing in the Last Year: Not on file       SCREENINGS    Anna Coma Scale  Eye Opening: Spontaneous  Best Verbal Response: Oriented  Best Motor Response: Obeys commands  Brett Coma Scale Score: 15 @FLOW([id number])@      PHYSICAL EXAM    (up to 7 for level 4, 8 or more for level 5)     ED Triage Vitals [03/14/22 1618]   BP Temp Temp Source Pulse Resp SpO2 Height Weight   (!) 146/60 99.9 °F (37.7 °C) Oral 105 20 97 % 5' (1.524 m) (!) 388 lb (176 kg)       Physical Exam  Vitals and nursing note reviewed. Constitutional:       General: She is awake. She is in acute distress. Appearance: Normal appearance. She is well-developed. She is morbidly obese. She is not ill-appearing, toxic-appearing or diaphoretic. Comments: No photophobia. No phonophobia. HENT:      Head: Normocephalic and atraumatic. No John's sign. Right Ear: External ear normal.      Left Ear: External ear normal.      Nose: Nose normal. No congestion or rhinorrhea. Mouth/Throat:      Mouth: Mucous membranes are moist.      Pharynx: Oropharynx is clear. No oropharyngeal exudate or posterior oropharyngeal erythema. Eyes:      General: No scleral icterus. Right eye: No foreign body or discharge. Left eye: No discharge. Extraocular Movements: Extraocular movements intact. Conjunctiva/sclera: Conjunctivae normal.      Left eye: No exudate. Pupils: Pupils are equal, round, and reactive to light. Neck:      Vascular: No JVD. Trachea: No tracheal deviation. Comments: No meningismus. Cardiovascular:      Rate and Rhythm: Regular rhythm. Tachycardia present. Pulses: Normal pulses. Heart sounds: Normal heart sounds. Heart sounds not distant. No murmur heard.   No friction rub. No gallop. Pulmonary:      Effort: Pulmonary effort is normal. No respiratory distress. Breath sounds: Normal breath sounds. No stridor. No wheezing, rhonchi or rales. Chest:      Chest wall: No tenderness. Abdominal:      General: Abdomen is flat. Bowel sounds are normal. There is no distension. Palpations: Abdomen is soft. There is no mass. Tenderness: There is no abdominal tenderness. There is no right CVA tenderness, left CVA tenderness, guarding or rebound. Hernia: No hernia is present. Musculoskeletal:         General: No swelling, tenderness, deformity or signs of injury. Normal range of motion. Cervical back: Normal range of motion and neck supple. No rigidity. Legs:    Lymphadenopathy:      Head:      Right side of head: No submental adenopathy. Left side of head: No submental adenopathy. Skin:     General: Skin is warm and dry. Capillary Refill: Capillary refill takes less than 2 seconds. Coloration: Skin is not jaundiced or pale. Findings: No bruising, erythema, lesion or rash. Neurological:      General: No focal deficit present. Mental Status: She is alert and oriented to person, place, and time. Mental status is at baseline. Cranial Nerves: No cranial nerve deficit. Sensory: No sensory deficit. Motor: No weakness. Coordination: Coordination normal.      Deep Tendon Reflexes: Reflexes are normal and symmetric. Psychiatric:         Mood and Affect: Mood normal.         Behavior: Behavior normal. Behavior is cooperative. Thought Content:  Thought content normal.         Judgment: Judgment normal.         DIAGNOSTIC RESULTS     EKG: All EKG's are interpreted by the Emergency Department Physician who either signs or Co-signsthis chart in the absence of a cardiologist.        RADIOLOGY:   Non-plain filmimages such as CT, Ultrasound and MRI are read by the radiologist. Plain radiographic images are visualized and preliminarily interpreted by the emergency physician with the below findings:        Interpretation per the Radiologist below, if available at the time ofthis note:    US DUP LOWER EXTREMITY LEFT YVETTE   Final Result   7.5 x 6.7 x 1.9 cm complex fluid collection involving subcutaneous tissues of left knee consistent with abscess or hematoma  . EXAMINATION: US SOFT TISSUE LIMITED AREA, US DUP LOWER EXTREMITY LEFT YVETTE      DATE AND TIME:3/14/2022 5:02 PM      CLINICAL HISTORY: Leg pain. Leg swelling. Thrombophlebitis, possible. Possible superficial and deep vein thrombosis, shortness of breath, dyspnea and edema   over left knee scab ? abscess             COMPARISON: None      TECHNIQUE: The lower extremity veins were evaluated with color doppler, gray scale imaging and spectral analysis while using compression and augmentation when possible. FINDINGS: Evaluation of the left lower extremity from the thigh to the knee shows normal phasic flow, normal augmentation of the Doppler signal, and normal compression of the deep veins. There is no sonographic evidence for acute deep venous thrombosis    from the left groin to the popliteal region. IMPRESSION:NEGATIVE FOR ACUTE DVT OF THE LEFT LOWER EXTREMITY FROM THE  GROIN TO THE KNEE.        US SOFT TISSUE LIMITED AREA   Final Result   7.5 x 6.7 x 1.9 cm complex fluid collection involving subcutaneous tissues of left knee consistent with abscess or hematoma  . EXAMINATION: US SOFT TISSUE LIMITED AREA, US DUP LOWER EXTREMITY LEFT YVETTE      DATE AND TIME:3/14/2022 5:02 PM      CLINICAL HISTORY: Leg pain. Leg swelling. Thrombophlebitis, possible.  Possible superficial and deep vein thrombosis, shortness of breath, dyspnea and edema   over left knee scab ? abscess             COMPARISON: None      TECHNIQUE: The lower extremity veins were evaluated with color doppler, gray scale imaging and spectral analysis while using compression and augmentation when possible. FINDINGS: Evaluation of the left lower extremity from the thigh to the knee shows normal phasic flow, normal augmentation of the Doppler signal, and normal compression of the deep veins. There is no sonographic evidence for acute deep venous thrombosis    from the left groin to the popliteal region. IMPRESSION:NEGATIVE FOR ACUTE DVT OF THE LEFT LOWER EXTREMITY FROM THE  GROIN TO THE KNEE.        XR TIBIA FIBULA LEFT (2 VIEWS)    (Results Pending)     X-ray left tib-fib: No bony lysis, no ectopic air along the bone. No fracture.     ED BEDSIDE ULTRASOUND:   Performed by ED Physician - none    LABS:  Labs Reviewed   CBC WITH AUTO DIFFERENTIAL - Abnormal; Notable for the following components:       Result Value    WBC 13.1 (*)     Hemoglobin 11.1 (*)     Hematocrit 35.8 (*)     MCV 77.5 (*)     MCH 24.0 (*)     MCHC 30.9 (*)     RDW 18.1 (*)     Neutrophils Absolute 11.8 (*)     Lymphocytes Absolute 0.7 (*)     All other components within normal limits   COMPREHENSIVE METABOLIC PANEL - Abnormal; Notable for the following components:    CO2 16 (*)     Anion Gap 18 (*)     Glucose 136 (*)     BUN 24 (*)     CREATININE 1.28 (*)     GFR Non- 42.5 (*)     GFR  51.4 (*)     Total Bilirubin 0.9 (*)     Alkaline Phosphatase 144 (*)     All other components within normal limits   C-REACTIVE PROTEIN - Abnormal; Notable for the following components:    .9 (*)     All other components within normal limits   SEDIMENTATION RATE - Abnormal; Notable for the following components:    Sed Rate 94 (*)     All other components within normal limits   PROCALCITONIN - Abnormal; Notable for the following components:    Procalcitonin 2.49 (*)     All other components within normal limits   CULTURE, BLOOD 2   CULTURE, BLOOD 1   CK   HEMOGLOBIN A1C   POCT GLUCOSE   POCT GLUCOSE       All other labs were within normal range or not returned as of this dictation. EMERGENCY DEPARTMENT COURSE and DIFFERENTIAL DIAGNOSIS/MDM:   Vitals:    Vitals:    03/14/22 1618 03/14/22 1914 03/14/22 1915   BP: (!) 146/60  (!) 139/57   Pulse: 105 95 91   Resp: 20  16   Temp: 99.9 °F (37.7 °C)     TempSrc: Oral     SpO2: 97%  97%   Weight: (!) 388 lb (176 kg)     Height: 5' (1.524 m)             MDM  Patient was treated for cellulitis the beginning of January doxycycline. Cellulitis and abscesses return. We will treat the patient with IV clindamycin due to penicillin and vancomycin allergies. Is allergic to penicillin which negates IV Zosyn. Clinical pharmacy consult was obtained with Merna Vazquez the clinical pharmacist, who recommends 900 loading dose of IV clindamycin. CRP, sed rate and procalcitonin are elevated. IV fluids were given after 500 cc by EMS were given. The ER physician does not want to do the 30 cc/kg actual body weight because this could result heart failure, due to massive quantity of IV fluid. His vital signs are stable. CRITICAL CARE TIME   Total Critical Care time was 42 minutes, excluding separately reportableprocedures. There was a high probability of clinicallysignificant/life threatening deterioration in the patient's condition which required my urgent intervention. CONSULTS:  [address] [place] [address] CONSULT TO INFECTIOUS DISEASES    PROCEDURES:  Unless otherwise noted below, none     Procedures    FINAL IMPRESSION      1. Cellulitis and abscess of left lower extremity    2. Septicemia [facility])          DISPOSITION/PLAN   DISPOSITION Admitted 03/14/2022 07:20:40 PM      PATIENT REFERRED TO:  No follow-up provider specified.     DISCHARGE MEDICATIONS:  New Prescriptions    No medications on file          (Please note that portions of this note were completed with a voice recognition program.  Efforts were made to edit the dictations but occasionally words are mis-transcribed.)    52 Mary Manriquez, DO (electronically signed)  Attending Emergency Physician          Loraine Fournier DO  03/14/22 6386

## 2022-03-15 PROBLEM — I87.2 VENOUS STASIS ULCER OF RIGHT CALF LIMITED TO BREAKDOWN OF SKIN WITHOUT VARICOSE VEINS (HCC): Status: ACTIVE | Noted: 2022-03-15

## 2022-03-15 PROBLEM — L97.211 VENOUS STASIS ULCER OF RIGHT CALF LIMITED TO BREAKDOWN OF SKIN WITHOUT VARICOSE VEINS (HCC): Status: ACTIVE | Noted: 2022-03-15

## 2022-03-15 PROBLEM — I99.9 LYMPHEDEMA DUE TO VENOUS DISEASE: Chronic | Status: ACTIVE | Noted: 2022-03-15

## 2022-03-15 PROBLEM — I89.0 LYMPHEDEMA DUE TO VENOUS DISEASE: Chronic | Status: ACTIVE | Noted: 2022-03-15

## 2022-03-15 LAB
ALBUMIN SERPL-MCNC: 3.1 G/DL (ref 3.5–4.6)
ANION GAP SERPL CALCULATED.3IONS-SCNC: 11 MEQ/L (ref 9–15)
BASOPHILS ABSOLUTE: 0 K/UL (ref 0–0.2)
BASOPHILS RELATIVE PERCENT: 0.1 %
BUN BLDV-MCNC: 26 MG/DL (ref 8–23)
C-REACTIVE PROTEIN, HIGH SENSITIVITY: >300 MG/L (ref 0–5)
CALCIUM SERPL-MCNC: 8.6 MG/DL (ref 8.5–9.9)
CHLORIDE BLD-SCNC: 107 MEQ/L (ref 95–107)
CO2: 20 MEQ/L (ref 20–31)
CREAT SERPL-MCNC: 1.32 MG/DL (ref 0.5–0.9)
EOSINOPHILS ABSOLUTE: 0 K/UL (ref 0–0.7)
EOSINOPHILS RELATIVE PERCENT: 0.4 %
GFR AFRICAN AMERICAN: 49.6
GFR NON-AFRICAN AMERICAN: 41
GLUCOSE BLD-MCNC: 107 MG/DL (ref 70–99)
GLUCOSE BLD-MCNC: 112 MG/DL (ref 70–99)
GLUCOSE BLD-MCNC: 113 MG/DL (ref 70–99)
GLUCOSE BLD-MCNC: 124 MG/DL (ref 70–99)
GLUCOSE BLD-MCNC: 143 MG/DL (ref 70–99)
HBA1C MFR BLD: 6.4 % (ref 4.8–5.9)
HCT VFR BLD CALC: 30.8 % (ref 37–47)
HEMOGLOBIN: 9.9 G/DL (ref 12–16)
LACTIC ACID: 0.8 MMOL/L (ref 0.5–2.2)
LYMPHOCYTES ABSOLUTE: 0.7 K/UL (ref 1–4.8)
LYMPHOCYTES RELATIVE PERCENT: 8.2 %
MAGNESIUM: 2 MG/DL (ref 1.7–2.4)
MCH RBC QN AUTO: 25 PG (ref 27–31.3)
MCHC RBC AUTO-ENTMCNC: 32.1 % (ref 33–37)
MCV RBC AUTO: 77.7 FL (ref 82–100)
MONOCYTES ABSOLUTE: 0.7 K/UL (ref 0.2–0.8)
MONOCYTES RELATIVE PERCENT: 7.5 %
NEUTROPHILS ABSOLUTE: 7.7 K/UL (ref 1.4–6.5)
NEUTROPHILS RELATIVE PERCENT: 83.8 %
PDW BLD-RTO: 18.4 % (ref 11.5–14.5)
PERFORMED ON: ABNORMAL
PHOSPHORUS: 2.6 MG/DL (ref 2.3–4.8)
PLATELET # BLD: 209 K/UL (ref 130–400)
POTASSIUM SERPL-SCNC: 4.3 MEQ/L (ref 3.4–4.9)
PROCALCITONIN: 2.08 NG/ML (ref 0–0.15)
RBC # BLD: 3.96 M/UL (ref 4.2–5.4)
SODIUM BLD-SCNC: 138 MEQ/L (ref 135–144)
WBC # BLD: 9.1 K/UL (ref 4.8–10.8)

## 2022-03-15 PROCEDURE — 2580000003 HC RX 258: Performed by: INTERNAL MEDICINE

## 2022-03-15 PROCEDURE — 83036 HEMOGLOBIN GLYCOSYLATED A1C: CPT

## 2022-03-15 PROCEDURE — 99222 1ST HOSP IP/OBS MODERATE 55: CPT | Performed by: INTERNAL MEDICINE

## 2022-03-15 PROCEDURE — 83605 ASSAY OF LACTIC ACID: CPT

## 2022-03-15 PROCEDURE — 6360000002 HC RX W HCPCS: Performed by: INTERNAL MEDICINE

## 2022-03-15 PROCEDURE — 86141 C-REACTIVE PROTEIN HS: CPT

## 2022-03-15 PROCEDURE — 6370000000 HC RX 637 (ALT 250 FOR IP): Performed by: INTERNAL MEDICINE

## 2022-03-15 PROCEDURE — 99251 PR INITL INPATIENT CONSULT NEW/ESTAB PT 20 MIN: CPT | Performed by: ORTHOPAEDIC SURGERY

## 2022-03-15 PROCEDURE — 1210000000 HC MED SURG R&B

## 2022-03-15 PROCEDURE — 2500000003 HC RX 250 WO HCPCS

## 2022-03-15 PROCEDURE — 2500000003 HC RX 250 WO HCPCS: Performed by: INTERNAL MEDICINE

## 2022-03-15 PROCEDURE — 83735 ASSAY OF MAGNESIUM: CPT

## 2022-03-15 PROCEDURE — 36415 COLL VENOUS BLD VENIPUNCTURE: CPT

## 2022-03-15 PROCEDURE — 99253 IP/OBS CNSLTJ NEW/EST LOW 45: CPT | Performed by: COLON & RECTAL SURGERY

## 2022-03-15 PROCEDURE — 80069 RENAL FUNCTION PANEL: CPT

## 2022-03-15 PROCEDURE — 84145 PROCALCITONIN (PCT): CPT

## 2022-03-15 PROCEDURE — 99211 OFF/OP EST MAY X REQ PHY/QHP: CPT

## 2022-03-15 PROCEDURE — 85025 COMPLETE CBC W/AUTO DIFF WBC: CPT

## 2022-03-15 RX ORDER — L. ACIDOPHILUS/L.BULGARICUS 1MM CELL
1 TABLET ORAL 2 TIMES DAILY
Status: DISCONTINUED | OUTPATIENT
Start: 2022-03-15 | End: 2022-03-23 | Stop reason: HOSPADM

## 2022-03-15 RX ORDER — DOXYCYCLINE HYCLATE 100 MG/1
100 CAPSULE ORAL EVERY 12 HOURS SCHEDULED
Status: DISCONTINUED | OUTPATIENT
Start: 2022-03-15 | End: 2022-03-20

## 2022-03-15 RX ORDER — TOPIRAMATE 25 MG/1
25 TABLET ORAL 2 TIMES DAILY
Status: DISCONTINUED | OUTPATIENT
Start: 2022-03-15 | End: 2022-03-15 | Stop reason: DRUGHIGH

## 2022-03-15 RX ORDER — TOPIRAMATE 25 MG/1
25 TABLET ORAL NIGHTLY
Status: DISCONTINUED | OUTPATIENT
Start: 2022-03-15 | End: 2022-03-23 | Stop reason: HOSPADM

## 2022-03-15 RX ORDER — TOPIRAMATE 25 MG/1
50 TABLET ORAL EVERY MORNING
Status: DISCONTINUED | OUTPATIENT
Start: 2022-03-16 | End: 2022-03-23 | Stop reason: HOSPADM

## 2022-03-15 RX ADMIN — SODIUM CHLORIDE, POTASSIUM CHLORIDE, SODIUM LACTATE AND CALCIUM CHLORIDE: 600; 310; 30; 20 INJECTION, SOLUTION INTRAVENOUS at 13:23

## 2022-03-15 RX ADMIN — CLINDAMYCIN IN 5 PERCENT DEXTROSE 900 MG: 18 INJECTION, SOLUTION INTRAVENOUS at 23:42

## 2022-03-15 RX ADMIN — MICONAZOLE NITRATE: 2 POWDER TOPICAL at 20:16

## 2022-03-15 RX ADMIN — MORPHINE SULFATE 2 MG: 2 INJECTION, SOLUTION INTRAMUSCULAR; INTRAVENOUS at 12:10

## 2022-03-15 RX ADMIN — CLINDAMYCIN IN 5 PERCENT DEXTROSE 900 MG: 18 INJECTION, SOLUTION INTRAVENOUS at 00:23

## 2022-03-15 RX ADMIN — LACTOBACILLUS TAB 1 TABLET: TAB at 20:17

## 2022-03-15 RX ADMIN — CLINDAMYCIN IN 5 PERCENT DEXTROSE 900 MG: 18 INJECTION, SOLUTION INTRAVENOUS at 17:59

## 2022-03-15 RX ADMIN — CLINDAMYCIN IN 5 PERCENT DEXTROSE 900 MG: 18 INJECTION, SOLUTION INTRAVENOUS at 12:11

## 2022-03-15 RX ADMIN — CLINDAMYCIN IN 5 PERCENT DEXTROSE 900 MG: 18 INJECTION, SOLUTION INTRAVENOUS at 05:43

## 2022-03-15 RX ADMIN — MORPHINE SULFATE 2 MG: 2 INJECTION, SOLUTION INTRAMUSCULAR; INTRAVENOUS at 23:44

## 2022-03-15 RX ADMIN — LACTOBACILLUS TAB 1 TABLET: TAB at 15:27

## 2022-03-15 RX ADMIN — MICONAZOLE NITRATE: 2 POWDER TOPICAL at 01:15

## 2022-03-15 RX ADMIN — Medication 10 ML: at 08:21

## 2022-03-15 RX ADMIN — ENOXAPARIN SODIUM 40 MG: 40 INJECTION SUBCUTANEOUS at 08:21

## 2022-03-15 RX ADMIN — MORPHINE SULFATE 2 MG: 2 INJECTION, SOLUTION INTRAMUSCULAR; INTRAVENOUS at 05:46

## 2022-03-15 RX ADMIN — LEVOTHYROXINE SODIUM 50 MCG: 0.05 TABLET ORAL at 07:00

## 2022-03-15 RX ADMIN — TOPIRAMATE 25 MG: 25 TABLET, FILM COATED ORAL at 20:17

## 2022-03-15 RX ADMIN — MICONAZOLE NITRATE: 2 POWDER TOPICAL at 08:25

## 2022-03-15 ASSESSMENT — PAIN SCALES - GENERAL
PAINLEVEL_OUTOF10: 8
PAINLEVEL_OUTOF10: 2
PAINLEVEL_OUTOF10: 5
PAINLEVEL_OUTOF10: 8
PAINLEVEL_OUTOF10: 7
PAINLEVEL_OUTOF10: 8

## 2022-03-15 ASSESSMENT — ENCOUNTER SYMPTOMS
SORE THROAT: 0
DIARRHEA: 0
VOMITING: 0
ABDOMINAL PAIN: 0
TROUBLE SWALLOWING: 0
VOICE CHANGE: 0
COUGH: 0
NAUSEA: 0
CONSTIPATION: 0
COLOR CHANGE: 0
BACK PAIN: 0
SHORTNESS OF BREATH: 0

## 2022-03-15 ASSESSMENT — PAIN DESCRIPTION - LOCATION
LOCATION: KNEE;LEG
LOCATION: KNEE
LOCATION: LEG;KNEE
LOCATION: KNEE;LEG

## 2022-03-15 ASSESSMENT — PAIN DESCRIPTION - ORIENTATION
ORIENTATION: LEFT

## 2022-03-15 ASSESSMENT — PAIN DESCRIPTION - DESCRIPTORS
DESCRIPTORS: SORE;SQUEEZING
DESCRIPTORS: SORE;SQUEEZING

## 2022-03-15 NOTE — PROGRESS NOTES
Hospitalist Progress Note      PCP: Carolyn Castellanos    Date of Admission: 3/14/2022    Chief Complaint:  No acute events, afebrile, stable HD    Medications:  Reviewed    Infusion Medications    sodium chloride      lactated ringers 75 mL/hr at 03/15/22 1323    dextrose       Scheduled Medications    miconazole   Topical BID    sodium chloride flush  5-40 mL IntraVENous 2 times per day    enoxaparin  40 mg SubCUTAneous Daily    insulin lispro  0-6 Units SubCUTAneous TID WC    insulin lispro  0-3 Units SubCUTAneous Nightly    clindamycin (CLEOCIN) IV  900 mg IntraVENous Q6H    levothyroxine  50 mcg Oral Daily     PRN Meds: sodium chloride flush, sodium chloride, ondansetron **OR** ondansetron, polyethylene glycol, acetaminophen **OR** acetaminophen, glucose, glucagon (rDNA), dextrose, dextrose bolus (hypoglycemia) **OR** dextrose bolus (hypoglycemia), morphine      Intake/Output Summary (Last 24 hours) at 3/15/2022 1417  Last data filed at 3/15/2022 2500  Gross per 24 hour   Intake 796.99 ml   Output --   Net 796.99 ml       Exam:    BP (!) 123/51   Pulse 87   Temp 98.6 °F (37 °C) (Oral)   Resp 18   Ht 5' (1.524 m)   Wt (!) 388 lb (176 kg)   SpO2 98%   BMI 75.78 kg/m²     General appearance: alert, cooperative  Lungs: clear to auscultation bilaterally  Heart: regular rate and rhythm and S1, S2 normal  Abdomen: soft, BS active  Extremities: left LE edema and erythema with induration near the left knee area, chronic lymphedema of the LE bilaterally, right leg covered with dressing      Labs:   Recent Labs     03/14/22  1630 03/15/22  0522   WBC 13.1* 9.1   HGB 11.1* 9.9*   HCT 35.8* 30.8*    209     Recent Labs     03/14/22  1630 03/15/22  0522    138   K 4.1 4.3    107   CO2 16* 20   BUN 24* 26*   CREATININE 1.28* 1.32*   CALCIUM 9.0 8.6   PHOS  --  2.6     Recent Labs     03/14/22  1630   AST 16   ALT 17   BILITOT 0.9*   ALKPHOS 144*     No results for input(s): INR in the last 72 hours. Recent Labs     03/14/22  1630   CKTOTAL 24       Urinalysis:      Lab Results   Component Value Date    NITRU Negative 01/06/2019    WBCUA 3-5 01/06/2019    BACTERIA Negative 01/06/2019    RBCUA 0-2 01/06/2019    BLOODU Negative 01/06/2019    SPECGRAV 1.031 01/06/2019    GLUCOSEU Negative 01/06/2019       Radiology:  XR TIBIA FIBULA LEFT (2 VIEWS)   Final Result   DEGENERATIVE CHANGE LEFT KNEE, AND LEFT HEEL. NO FRACTURE. US DUP LOWER EXTREMITY LEFT YVETTE   Final Result   7.5 x 6.7 x 1.9 cm complex fluid collection involving subcutaneous tissues of left knee consistent with abscess or hematoma  . EXAMINATION: US SOFT TISSUE LIMITED AREA, US DUP LOWER EXTREMITY LEFT YVETTE      DATE AND TIME:3/14/2022 5:02 PM      CLINICAL HISTORY: Leg pain. Leg swelling. Thrombophlebitis, possible. Possible superficial and deep vein thrombosis, shortness of breath, dyspnea and edema   over left knee scab ? abscess             COMPARISON: None      TECHNIQUE: The lower extremity veins were evaluated with color doppler, gray scale imaging and spectral analysis while using compression and augmentation when possible. FINDINGS: Evaluation of the left lower extremity from the thigh to the knee shows normal phasic flow, normal augmentation of the Doppler signal, and normal compression of the deep veins. There is no sonographic evidence for acute deep venous thrombosis    from the left groin to the popliteal region. IMPRESSION:NEGATIVE FOR ACUTE DVT OF THE LEFT LOWER EXTREMITY FROM THE  GROIN TO THE KNEE.        US SOFT TISSUE LIMITED AREA   Final Result   7.5 x 6.7 x 1.9 cm complex fluid collection involving subcutaneous tissues of left knee consistent with abscess or hematoma  . EXAMINATION: US SOFT TISSUE LIMITED AREA, US DUP LOWER EXTREMITY LEFT YVETTE      DATE AND TIME:3/14/2022 5:02 PM      CLINICAL HISTORY: Leg pain. Leg swelling. Thrombophlebitis, possible.

## 2022-03-15 NOTE — CARE COORDINATION
[facility] [place] Case Management Initial Discharge Assessment    Met with Patient to discuss discharge plan. PCP: Chula Hernandez                                Date of Last Visit: Feb 2022    [facility] Patient: No        [facility] Notified: no    If no PCP, list provided? N/A    Discharge Planning    Living Arrangements: independently at home    Who do you live with? Alone    Who helps you with your care:  self or [facility]    If lives at home:     Do you have any barriers navigating in your home? yes - 4-5 Stairs to enter home in the back; 6 stairs to enter in front. Able to stay on one level once in home. Patient can perform ADL? Yes    Current Services (outpatient and in home) :  [facility] ([address] and PT)    Dialysis: No    Is transportation available to get to your appointments? Yes    DME Equipment:  no    Respiratory equipment: CPAP without O2    Respiratory provider:  yes - [address] [place] [address],[address]:  yes - [employer] in [address] [place] [address] with Medication Assistance Program?  No      Patient agreeable to [facility]? Yes, Company Resumption with [facility]    Patient agreeable to SNF/Rehab? Yes, [address]     Other discharge needs identified? Other [facility] BED      Initial Discharge Plan? (Note: please see concurrent daily documentation for any updates after initial note). D/C PLAN IS HOME W/RESUMPTION OF [facility] SERVICES VS [facility] FOR REH[facility]. WILL DEPEND WHAT PT/OT EVALS RECOMMEND ONCE PATIENT HAS LEFT KNEE EVALUATED BY ORTHOPEDIC SURGERY THIS MORNING.     Readmission Risk              Risk of Unplanned Readmission:  15         Electronically signed by Efraín Nickerson RN on 3/15/2022 at 7:36 AM  [place]

## 2022-03-15 NOTE — PROGRESS NOTES
Assessment completed this shift. Alert and oriented x4. Denies nausea. Bathed patient and changed linens with assistance from Deirdre Edgar. Frank [place] 118. BLE +2 edema with venous status ulcers noted. Yellow drainage noted earlier this am. Dressings applied by podiatry. Patient requested Morphine for L knee/leg pain 8/10. Will continue to monitor.   Electronically signed by April Betancur RN on 3/15/2022 at 12:03 PM

## 2022-03-15 NOTE — CONSULTS
Infectious Diseases Inpatient Consult Note      Reason for Consult:   Antibiotics management for knee abscess  Primary Care Physician:  Miranda Lu  History Obtained From:   Pt, EPIC    Admit Date: 3/14/2022  Hospital Day: 2      HISTORY OF PRESENT ILLNESS:  This is a 62 y.o. female was admitted to [facility]  from home through ER with progressive fevers, chills, generalized body aches, fatigue and decreased appetite. Patient has progressive left leg swelling redness and severe pain. Patient fell in January. Patient started having left knee swelling and pain with copious amount of drainage in February. Was treated with 10 days of oral doxycycline. Symptoms worsened after she ran out of doxycycline. Patient had low-grade fever on admission.    Blood cultures are pending  Had decreased leukocytosis since clindamycin was started  Has highly elevated CRP of greater than 300, elevated procalcitonin    Past medical surgical and social history were reviewed and are as detailed below  Past Medical History:   Diagnosis Date    Bipolar depression ([facility]     Depression     Diabetes mellitus ([facility]     Hypertension     PTSD (post-traumatic stress disorder)     Thyroid disease    PMH of HTN, DM2, CKD3, morbid obesity, chronic lymphedema of the LE        Past Surgical History:   Procedure Laterality Date    BREAST LUMPECTOMY Right     CERVICAL LAMINECTOMY      C2-C6    INCISION AND DRAINAGE Left 1/7/2019    INCISION AND DRAINAGE LEFT PERIRECTAL  ABSCESS performed by Washington Jhaveri MD at [facility]         Current Medications:     miconazole   Topical BID    sodium chloride flush  5-40 mL IntraVENous 2 times per day    enoxaparin  40 mg SubCUTAneous Daily    insulin lispro  0-6 Units SubCUTAneous TID WC    insulin lispro  0-3 Units SubCUTAneous Nightly    clindamycin (CLEOCIN) IV  900 mg IntraVENous Q6H    levothyroxine  50 mcg Oral Daily       Allergies:  Bactrim [sulfamethoxazole-trimethoprim], Penicillins, Prednisone, and Vancomycin    Social History     Socioeconomic History    Marital status:      Spouse name: Not on file    Number of children: Not on file    Years of education: Not on file    Highest education level: Not on file   Occupational History    Not on file   Tobacco Use    Smoking status: Never Smoker    Smokeless tobacco: Never Used   Substance and Sexual Activity    Alcohol use: Yes     Comment: occassionally    Drug use: No    Sexual activity: Not on file   Other Topics Concern    Not on file   Social History Narrative    Not on file     Social Determinants of Health     Financial Resource Strain:     Difficulty of Paying Living Expenses: Not on file   Food Insecurity:     Worried About Running Out of Food in the Last Year: Not on file    Savage of Food in the Last Year: Not on file   Transportation Needs:     Lack of Transportation (Medical): Not on file    Lack of Transportation (Non-Medical):  Not on file   Physical Activity:     Days of Exercise per Week: Not on file    Minutes of Exercise per Session: Not on file   Stress:     Feeling of Stress : Not on file   Social Connections:     Frequency of Communication with Friends and Family: Not on file    Frequency of Social Gatherings with Friends and Family: Not on file    Attends [employer] Services: Not on file    Active Member of [address] [place] [address] [employer] or Organizations: Not on file    Attends Club or Organization Meetings: Not on file    Marital Status: Not on file   Intimate Partner Violence:     Fear of Current or Ex-Partner: Not on file    Emotionally Abused: Not on file    Physically Abused: Not on file    Sexually Abused: Not on file   Housing Stability:     Unable to Pay for Housing in the Last Year: Not on file    Number of [place] in the Last Year: Not on file    Unstable Housing in the Last Year: Not on file         Family History:   Family History   Problem Relation Age of Onset    Bipolar Disorder Mother     Bipolar Disorder Sister     Bipolar Disorder Brother        Review of Systems  14 system review is negative other than HPI    Physical Exam  Vitals:    03/14/22 2000 03/14/22 2030 03/14/22 2156 03/15/22 0738   BP:  (!) 126/58 (!) 129/49 (!) 123/51   Pulse: 86 89 87 87   Resp: 16 16 18 18   Temp:   98.6 °F (37 °C) 98.6 °F (37 °C)   TempSrc:   Oral Oral   SpO2:  98% 98% 98%   Weight:       Height:         General Appearance: alert and oriented to person, place and time, well-developed and well-nourished, in no acute distress  Skin: warm anddry, no rash. Head: normocephalic and atraumatic  Eyes: pupils equal, round, and reactive to light,extraocular eye movements intact, conjunctivae normal, anicteric sclerae  ENT: oropharynx clear and moist with normal mucous membranes.  No thrush  Lungs: normal respiratory effort, Clear Lungs, no rhonchi, no crackles, no wheezes  Heart: nl S1/S2, no murmur  Abdomen: soft, no tenderness, no H-S-megaly, + BS, large pannus  NEUROLOGICAL: alert and oriented x 3, no focal deficits  Bilateral legs and left thigh erythema and warmth  Bilateral legs few superficial ulcers  Left knee with a small scab without any drainage, no fluctulance  Patient is able to bend left knee without pain      DATA:    Lab Results   Component Value Date    WBC 9.1 03/15/2022    HGB 9.9 (L) 03/15/2022    HCT 30.8 (L) 03/15/2022    MCV 77.7 (L) 03/15/2022     03/15/2022     Lab Results   Component Value Date    CREATININE 1.32 (H) 03/15/2022    BUN 26 (H) 03/15/2022     03/15/2022    K 4.3 03/15/2022     03/15/2022    CO2 20 03/15/2022       Hepatic Function Panel:   Lab Results   Component Value Date    ALKPHOS 144 03/14/2022    ALT 17 03/14/2022    AST 16 03/14/2022    PROT 7.1 03/14/2022    BILITOT 0.9 03/14/2022    LABALBU 3.1 03/15/2022      DUP LOWER EXTREMITY LEFT YVETTE [[id number]] Collected: 03/14/22 1753 Updated: 03/14/22 1802 Narrative: EXAMINATION: US SOFT TISSUE LIMITED AREA, US DUP LOWER EXTREMITY LEFT YVETTE     COMPARISON:None     CLINICAL HISTORY:  over left knee scab ? abscess      FINDINGS:Targeted ultrasound scans of the left anterior knee. Significant subcutaneous edema with a 6.7 x 7.5 x 1.9 cm complex hypoechoic area lateral to the skin defect on the left knee that is compatible with abscess or complex hematoma. Impression:  7.5 x 6.7 x 1.9 cm complex fluid collection involving subcutaneous tissues of left knee consistent with abscess or hematoma  . Impression:  DEGENERATIVE CHANGE LEFT KNEE, AND LEFT HEEL.  NO FRACTURE     IMPRESSION:    · Left lower extremity cellulitis   · left knee abscess/infected hematoma  · Failure of oral antibiotics  · Bilateral legs venous stasis ulcers  · Diabetes mellitus type 2 with morbid obesity    Patient Active Problem List   Diagnosis    Asthma, chronic    Cervical myelopathy (HCC)    Cervical vertebral fusion    Diabetes mellitus due to underlying condition with diabetic polyneuropathy ([address]    Diabetes mellitus type II, controlled ([address]    HTN (hypertension)    Hyperlipidemia    Metabolic syndrome    Stasis dermatitis    Sepsis ([address]    Perirectal abscess    Bipolar 1 disorder, depressed, severe ([facility]    Anxiety disorder, unspecified    Chronic post-traumatic stress disorder (PTSD)    Abscess       PLAN:  · Continue IV clindamycin for now  · Add p.o. doxycycline for possibility of MRSA  · Agree with orthopedic surgeon evaluation  · Check CT of the knee  · Follow-up blood cultures  · Repeat CRP in few days  · Add lactobacillus    Discussed with patient    Veverly Mortimer, MD

## 2022-03-15 NOTE — CONSULTS
Consult Note  Patient: Pau Rodriguez  Unit/Bed: G185/I136-61  YOB: 1960  MRN: [id number]  Acct: [de-identified]   Admitting Diagnosis: Abscess [L02.91]  Septicemia [facility]) [A41.9]  Cellulitis and abscess of left lower extremity [L03.116, L02.416]  Date:  3/14/2022  Hospital Day: 1    Complaint:  Cellulitis and abscess of left lower extremity     History of Present Illness:    Patient is a 62year old female with past medical history of bipolar depression, DM, HTN, PTSD, and thyroid disease. She presented to the ER yesterday with increasing pain to left lower extremity and warmth to this extremity and ill feeling. Patient reports that she had an area on the left medial knee that started out to look like a pimple in January. She saw her PCP in February for this who started her on doxycycline. She reports that after completion of oral antibiotics the \"pimple\" opened up and had significant amount of purulent drainage. She reports this drainage to have slowed down and over the course of the past few weeks to have had intermittent purulent drainage of the left knee in an area where the skin was open secondary to fall. She reports some pain and tenderness to the medial left knee and anterior left thigh exacerbated by movement and palpation.        PMHx:  Past Medical History:   Diagnosis Date    Bipolar depression ([facility]     Depression     Diabetes mellitus ([facility]     Hypertension     PTSD (post-traumatic stress disorder)     Thyroid disease        PSHx:  Past Surgical History:   Procedure Laterality Date    BREAST LUMPECTOMY Right     CERVICAL LAMINECTOMY      C2-C6    INCISION AND DRAINAGE Left 1/7/2019    INCISION AND DRAINAGE LEFT PERIRECTAL  ABSCESS performed by Amena Gamez MD at [address] [place] [address]         Social Hx:  Social History     Socioeconomic History    Marital status:      Spouse name: None    Number of children: None    Years of education: None    Highest education level: None   Occupational History    None   Tobacco Use    Smoking status: Never Smoker    Smokeless tobacco: Never Used   Substance and Sexual Activity    Alcohol use: Yes     Comment: occassionally    Drug use: No    Sexual activity: None   Other Topics Concern    None   Social History Narrative    None     Social Determinants of Health     Financial Resource Strain:     Difficulty of Paying Living Expenses: Not on file   Food Insecurity:     Worried About Running Out of Food in the Last Year: Not on file    Savage of Food in the Last Year: Not on file   Transportation Needs:     Lack of Transportation (Medical): Not on file    Lack of Transportation (Non-Medical): Not on file   Physical Activity:     Days of Exercise per Week: Not on file    Minutes of Exercise per Session: Not on file   Stress:     Feeling of Stress : Not on file   Social Connections:     Frequency of Communication with Friends and Family: Not on file    Frequency of Social Gatherings with Friends and Family: Not on file    Attends Judaism Services: Not on file    Active Member of [address] [place] [address] or Organizations: Not on file    Attends Club or Organization Meetings: Not on file    Marital Status: Not on file   Intimate Partner Violence:     Fear of Current or Ex-Partner: Not on file    Emotionally Abused: Not on file    Physically Abused: Not on file    Sexually Abused: Not on file   Housing Stability:     Unable to Pay for Housing in the Last Year: Not on file    Number of [place] in the Last Year: Not on file    Unstable Housing in the Last Year: Not on file       Family Hx:  Family History   Problem Relation Age of Onset    Bipolar Disorder Mother     Bipolar Disorder Sister     Bipolar Disorder Brother        Review of Systems:   Review of Systems   Constitutional: Negative for appetite change and fever.    HENT: Negative for drooling, ear pain, sore throat, trouble swallowing and voice change. Respiratory: Negative for cough and shortness of breath. Cardiovascular: Negative for chest pain. Gastrointestinal: Negative for abdominal pain, constipation, diarrhea, nausea and vomiting. Genitourinary: Negative for decreased urine volume and dysuria. Musculoskeletal: Positive for arthralgias (left lower extremity ) and gait problem (patient reportt difficulty with ambulation at baseline ). Negative for back pain. Skin: Negative for color change. Neurological: Negative for dizziness, weakness, light-headedness and headaches. Psychiatric/Behavioral: Negative for agitation and behavioral problems. All other systems reviewed and are negative. Physical Examination:    BP (!) 123/51   Pulse 87   Temp 98.6 °F (37 °C) (Oral)   Resp 18   Ht 5' (1.524 m)   Wt (!) 388 lb (176 kg)   SpO2 98%   BMI 75.78 kg/m²    Physical Exam  Vitals and nursing note reviewed. Constitutional:       General: She is not in acute distress. Appearance: Normal appearance. She is well-developed. HENT:      Head: Normocephalic and atraumatic. Nose: Nose normal.      Mouth/Throat:      Mouth: Mucous membranes are moist.   Eyes:      General:         Right eye: No discharge. Left eye: No discharge. Conjunctiva/sclera: Conjunctivae normal.   Neck:      Vascular: No JVD. Trachea: No tracheal deviation. Cardiovascular:      Rate and Rhythm: Normal rate. Pulmonary:      Effort: Pulmonary effort is normal.      Breath sounds: Normal breath sounds. Abdominal:      General: Bowel sounds are normal.      Palpations: Abdomen is soft. Musculoskeletal:         General: Normal range of motion. Cervical back: Normal range of motion and neck supple. No rigidity. No muscular tenderness. Lymphadenopathy:      Cervical: No cervical adenopathy. Skin:     General: Skin is warm and dry. Capillary Refill: Capillary refill takes less than 2 seconds. Findings: Erythema (bilateral lower extremities ) present. Neurological:      Mental Status: She is alert and oriented to person, place, and time.    Psychiatric:         Mood and Affect: Mood normal.         Behavior: Behavior normal.         LABS:  CBC:   Lab Results   Component Value Date    WBC 9.1 03/15/2022    RBC 3.96 03/15/2022    HGB 9.9 03/15/2022    HCT 30.8 03/15/2022    MCV 77.7 03/15/2022    MCH 25.0 03/15/2022    MCHC 32.1 03/15/2022    RDW 18.4 03/15/2022     03/15/2022     CBC with Differential:    Lab Results   Component Value Date    WBC 9.1 03/15/2022    RBC 3.96 03/15/2022    HGB 9.9 03/15/2022    HCT 30.8 03/15/2022     03/15/2022    MCV 77.7 03/15/2022    MCH 25.0 03/15/2022    MCHC 32.1 03/15/2022    RDW 18.4 03/15/2022    BANDSPCT 1 01/15/2019    METASPCT 2 01/15/2019    LYMPHOPCT 8.2 03/15/2022    MONOPCT 7.5 03/15/2022    MYELOPCT 2 01/15/2019    BASOPCT 0.1 03/15/2022    MONOSABS 0.7 03/15/2022    LYMPHSABS 0.7 03/15/2022    EOSABS 0.0 03/15/2022    BASOSABS 0.0 03/15/2022     CMP:    Lab Results   Component Value Date     03/15/2022    K 4.3 03/15/2022    K 3.8 01/07/2019     03/15/2022    CO2 20 03/15/2022    BUN 26 03/15/2022    CREATININE 1.32 03/15/2022    GFRAA 49.6 03/15/2022    LABGLOM 41.0 03/15/2022    GLUCOSE 107 03/15/2022    PROT 7.1 03/14/2022    LABALBU 3.1 03/15/2022    CALCIUM 8.6 03/15/2022    BILITOT 0.9 03/14/2022    ALKPHOS 144 03/14/2022    AST 16 03/14/2022    ALT 17 03/14/2022     BMP:    Lab Results   Component Value Date     03/15/2022    K 4.3 03/15/2022    K 3.8 01/07/2019     03/15/2022    CO2 20 03/15/2022    BUN 26 03/15/2022    LABALBU 3.1 03/15/2022    CREATININE 1.32 03/15/2022    CALCIUM 8.6 03/15/2022    GFRAA 49.6 03/15/2022    LABGLOM 41.0 03/15/2022    GLUCOSE 107 03/15/2022     Magnesium:  Lab Results   Component Value Date    MG 2.0 03/15/2022     Troponin:  No results found for: TROPONINI    I have obtained images with verbal permission of the patient with use of [employer] which have been uploaded to media section of patient chart and have been provided below for review:       Media Information             Document Information    Clinical Consent:  Wound      03/15/2022 08:03   Attached To: Hospital Encounter on 3/14/22     Source Information    BRAD Stover CNP  [facility]         Assessment:    Active Hospital Problems    Diagnosis Date Noted    Abscess [L02.91] 03/14/2022     Patient admitted to hospital for cellulitis and abscess of left lower extremity The patient has not had any joint replacements to this extremity. Patienth as chronic discoloration and swelling of bilateral lower extremities most likely related to venous stasis. No orthopedic intervention warranted and recommend evaluation by general surgery for this. Orthopedics will sign off of case. Please call for any questions or concerns. Thank you for this consultation and allowing us to be a part of this patient's care. Plan:  1. Appreciate general surgery recommendations   2.  Continue antibiotics per primary team            Electronically signed by BRAD Abraham CNP on 3/15/2022 at 11:56 AM

## 2022-03-15 NOTE — PROGRESS NOTES
Wound Ostomy Continence Nurse  Consult Note       NAME:  [address] [place] [address] Miguel Angel RECORD NUMBER:  [id number]  AGE: 62 y.o. GENDER: female  : 1960  TODAY'S DATE:  3/15/2022    Subjective   Reason for [address] Nurse Evaluation and Assessment: Bilateral LE venous ulcers; MASD in skin folds      Willow Vivar is a 62 y.o. female referred by:   [x] Physician  [] Nursing  [] Other:     Wound Identification:  Wound Type: venous and MASD  Contributing Factors: decreased mobility and obesity    Wound History: Patient admitted to [facility] with venous ulcers to bilateral LE, which patient states she has \"had them for a while. \" Patient reports that wounds are cared for at home by her [facility]. Patient also has MASD with fungal rash and denudation noted  Current Wound Care Treatment: Podiatry is on for LEs and abscess.  Continue use of antifungal powder in skin folds    Patient Goal of Care:  [x] Wound Healing  [] Odor Control  [] Palliative Care  [] Pain Control   [] Other:         PAST MEDICAL HISTORY        Diagnosis Date    [facility]     Depression     Diabetes mellitus ([facility]     Hypertension     PTSD (post-traumatic stress disorder)     Thyroid disease        PAST SURGICAL HISTORY    Past Surgical History:   Procedure Laterality Date    BREAST LUMPECTOMY Right     CERVICAL LAMINECTOMY      C2-C6    INCISION AND DRAINAGE Left 2019    INCISION AND DRAINAGE LEFT PERIRECTAL  ABSCESS performed by Marisa Guzman MD at [address] [facility] ROTATOR CUFF REPAIR         FAMILY HISTORY    Family History   Problem Relation Age of Onset    Bipolar Disorder Mother     Bipolar Disorder Sister     Bipolar Disorder Brother        SOCIAL HISTORY    Social History     Tobacco Use    Smoking status: Never Smoker    Smokeless tobacco: Never Used   Substance Use Topics    Alcohol use: Yes     Comment: occassionally    Drug use: No       ALLERGIES    Allergies   Allergen Reactions    Bactrim [Sulfamethoxazole-Trimethoprim] Rash    Penicillins Swelling    Prednisone Other (See Comments)     States made her angry and manic    Vancomycin Other (See Comments)     Put her in renal failure per pt       MEDICATIONS    No current facility-administered medications on file prior to encounter. Current Outpatient Medications on File Prior to Encounter   Medication Sig Dispense Refill    TRULICITY 3 ZD/7.9LD SOPN Inject 3 mg into the skin once a week Every Monday      lisinopril (PRINIVIL;ZESTRIL) 10 MG tablet Take 10 mg by mouth in the morning and at bedtime      topiramate (TOPAMAX) 25 MG tablet Take 25 mg by mouth in the morning and at bedtime 2 tabs am 1 tab HS      Cholecalciferol (VITAMIN D) 2000 units CAPS capsule Take by mouth      levothyroxine (SYNTHROID) 25 MCG tablet Take 50 mcg by mouth Daily       mupirocin (BACTROBAN) 2 % ointment Apply topically 3 times daily Apply topically 3 times daily.  1 each 0    buPROPion (WELLBUTRIN XL) 150 MG extended release tablet Take 1 tablet by mouth daily 30 tablet 1    traZODone (DESYREL) 100 MG tablet Take 1 tablet by mouth nightly 30 tablet 1    lurasidone (LATUDA) 60 MG TABS tablet Take 1 tablet by mouth daily 30 tablet 1    prazosin (MINIPRESS) 1 MG capsule Take 1 capsule by mouth 2 times daily 60 capsule 2    losartan (COZAAR) 100 MG tablet Take 100 mg by mouth daily      linagliptin (TRADJENTA) 5 MG tablet Take 1 tablet by mouth daily 30 tablet 2    amLODIPine (NORVASC) 5 MG tablet Take 1 tablet by mouth 2 times daily 30 tablet 3    docusate sodium (COLACE, DULCOLAX) 100 MG CAPS Take 100 mg by mouth 2 times daily 60 capsule 1    aspirin 81 MG tablet Take 81 mg by mouth daily         Objective    BP (!) 123/51   Pulse 87   Temp 98.6 °F (37 °C) (Oral)   Resp 18   Ht 5' (1.524 m)   Wt (!) 388 lb (176 kg)   SpO2 98%   BMI 75.78 kg/m²     LABS:  WBC:    Lab Results   Component Value Date    WBC 9.1 03/15/2022     H/H: Lab Results   Component Value Date    HGB 9.9 03/15/2022    HCT 30.8 03/15/2022     PTT:    Lab Results   Component Value Date    APTT 34.3 01/06/2019   [APTT}  PT/INR:    Lab Results   Component Value Date    PROTIME 12.1 01/06/2019    INR 1.2 01/06/2019     HgBA1c:    Lab Results   Component Value Date    LABA1C 6.4 03/15/2022       Assessment   Duarte Risk Score: Duarte Scale Score: 17    Patient Active Problem List   Diagnosis    Asthma, chronic    Cervical myelopathy (HCC)    Cervical vertebral fusion    Diabetes mellitus due to underlying condition with diabetic polyneuropathy ([facility]    Diabetes mellitus type II, controlled ([facility] [facility]    HTN (hypertension)    Hyperlipidemia    Metabolic syndrome    Stasis dermatitis    Sepsis ([facility] [facility]    Perirectal abscess    Bipolar 1 disorder, depressed, severe (HCC)    Anxiety disorder, unspecified    Chronic post-traumatic stress disorder (PTSD)    Abscess       Measurements:  Wound 03/14/22 Pelvis Left open area skin fold: Moisture Associated Skin Damage (MASD) (Active)   Wound Etiology Other 03/15/22 1005   Dressing/Treatment Pharmaceutical agent (see MAR) 03/15/22 0814   Number of days: 0       Wound 03/14/22 Leg Right;Distal;Lateral (Active)   Wound Etiology Venous 03/15/22 1005   Wound Length (cm) 14 cm 03/15/22 1005   Wound Width (cm) 4 cm 03/15/22 1005   Wound Depth (cm) 0.1 cm 03/15/22 1005   Wound Surface Area (cm^2) 56 cm^2 03/15/22 1005   Wound Volume (cm^3) 5.6 cm^3 03/15/22 1005   Wound Assessment Pink/red 03/15/22 1005   Drainage Amount Small 03/15/22 1005   Drainage Description Serous 03/15/22 1005   Odor None 03/15/22 1005   Lina-wound Assessment Maceration 03/15/22 1005   Margins Undefined edges 03/15/22 1005   Wound Thickness Description not for Pressure Injury Full thickness 03/15/22 1005   Number of days: 1       Wound 03/14/22 Leg Left;Distal;Lateral (Active)   Wound Etiology Venous 03/15/22 1005   Wound Length (cm) 9 cm 03/15/22 1005   Wound Width (cm) 3 cm 03/15/22 1005   Wound Depth (cm) 0.1 cm 03/15/22 1005   Wound Surface Area (cm^2) 27 cm^2 03/15/22 1005   Wound Volume (cm^3) 2.7 cm^3 03/15/22 1005   Wound Assessment Pink/red 03/15/22 1005   Drainage Amount Small 03/15/22 1005   Drainage Description Serous 03/15/22 1005   Odor None 03/15/22 1005   Sushila-wound Assessment Maceration 03/15/22 1005   Margins Undefined edges 03/15/22 1005   Wound Thickness Description not for Pressure Injury Full thickness 03/15/22 1005   Number of days: 1     Incision 01/07/19 Perineum Left (Active)   Number of days: 1162     Assessment:    Bilateral LE evaluated at this time -venous ulcers noted with a clean, pink/red granular wound bed, shallow depth, and small amount of serous drainage and sushila wound maceration. Bilateral LEs noted to be red and edematous, down into feet. Plan   Plan of Care: Wound 03/14/22 Pelvis Left open area skin fold: Moisture Associated Skin Damage (MASD)-Dressing/Treatment: Pharmaceutical agent (see MAR)    Specialty Bed Required : N/A   [] Low Air Loss   [] Pressure Redistribution  [] Fluid Immersion  [] Bariatric  [] Other:     Current Diet: ADULT DIET;  Regular  Dietician consult:  Yes    Discharge Plan:  Placement for patient upon discharge: home with support    Patient appropriate for Outpatient [address]: Yes    Referrals:  []   [] [facility]  [] Supplies  [] Other    Patient/Caregiver Teaching:  Level of patient/caregiver understanding able to:   [] Indicates understanding       [] Needs reinforcement  [] Unsuccessful      [] Verbal Understanding  [] Demonstrated understanding       [] No evidence of learning  [] Refused teaching         [x] N/A       Electronically signed by XI AlvaradoN, RN, CWOCN on 3/15/2022 at 10:38 AM

## 2022-03-15 NOTE — CONSULTS
Department of General Surgery - Adult  Surgical Service general surgery  Attending Consult Note      Reason for Consult: Left leg abscess      CHIEF COMPLAINT: Left leg pain    History Obtained From:  patient, electronic medical record    HISTORY OF PRESENT ILLNESS:                The patient is a 62 y.o. female who presents with left leg pain with swelling. Patient with a history of chronic lymphedema lower extremities. She has developed a swollen area around her left knee which has spontaneously drained but quickly reaccumulated. She had an ultrasound which showed a complex fluid collection subcutaneous around the left knee. I was asked to see her regarding operative drainage.     Past Medical History:        Diagnosis Date    B[facility] ([facility]     Depression     Diabetes mellitus ([facility]     Hypertension     PTSD (post-traumatic stress disorder)     Thyroid disease      Past Surgical History:        Procedure Laterality Date    BREAST LUMPECTOMY Right     CERVICAL LAMINECTOMY      C2-C6    INCISION AND DRAINAGE Left 1/7/2019    INCISION AND DRAINAGE LEFT PERIRECTAL  ABSCESS performed by Shahla Jefferson MD at [address] [facility]       Current Medications:   Current Facility-Administered Medications: miconazole (MICOTIN) 2 % powder, , Topical, BID  doxycycline hyclate (VIBRAMYCIN) capsule 100 mg, 100 mg, Oral, 2 times per day  lactobacillus acidophilus (FLORANEX) 1 tablet, 1 tablet, Oral, BID  [START ON 3/16/2022] topiramate (TOPAMAX) tablet 50 mg, 50 mg, Oral, QAM **AND** topiramate (TOPAMAX) tablet 25 mg, 25 mg, Oral, Nightly  sodium chloride flush 0.9 % injection 5-40 mL, 5-40 mL, IntraVENous, 2 times per day  sodium chloride flush 0.9 % injection 5-40 mL, 5-40 mL, IntraVENous, PRN  0.9 % sodium chloride infusion, 25 mL, IntraVENous, PRN  enoxaparin (LOVENOX) injection 40 mg, 40 mg, SubCUTAneous, Daily  ondansetron (ZOFRAN-ODT) disintegrating tablet 4 mg, 4 mg, Oral, Q8H PRN **OR** ondansetron (ZOFRAN) injection 4 mg, 4 mg, IntraVENous, Q6H PRN  polyethylene glycol (GLYCOLAX) packet 17 g, 17 g, Oral, Daily PRN  acetaminophen (TYLENOL) tablet 650 mg, 650 mg, Oral, Q6H PRN **OR** acetaminophen (TYLENOL) suppository 650 mg, 650 mg, Rectal, Q6H PRN  glucose (GLUTOSE) 40 % oral gel 15 g, 15 g, Oral, PRN  glucagon (rDNA) injection 1 mg, 1 mg, IntraMUSCular, PRN  dextrose 5 % solution, 100 mL/hr, IntraVENous, PRN  insulin lispro (HUMALOG) injection vial 0-6 Units, 0-6 Units, SubCUTAneous, TID WC  insulin lispro (HUMALOG) injection vial 0-3 Units, 0-3 Units, SubCUTAneous, Nightly  dextrose bolus (hypoglycemia) 10% 125 mL, 125 mL, IntraVENous, PRN **OR** dextrose bolus (hypoglycemia) 10% 250 mL, 250 mL, IntraVENous, PRN  clindamycin (CLEOCIN) 900 mg in dextrose 5 % 50 mL IVPB, 900 mg, IntraVENous, Q6H  morphine (PF) injection 2 mg, 2 mg, IntraVENous, Q3H PRN  levothyroxine (SYNTHROID) tablet 50 mcg, 50 mcg, Oral, Daily  Allergies:  Bactrim [sulfamethoxazole-trimethoprim], Penicillins, Prednisone, and Vancomycin    Social History:   TOBACCO:   reports that she has never smoked. She has never used smokeless tobacco.  ETOH:   reports current alcohol use. DRUGS:   reports no history of drug use.   Family History:       Problem Relation Age of Onset    Bipolar Disorder Mother     Bipolar Disorder Sister     Bipolar Disorder Brother        REVIEW OF SYSTEMS:    CONSTITUTIONAL:  positive for  fatigue and malaise  EYES:  negative  HEENT:  negative  RESPIRATORY:  negative  CARDIOVASCULAR:  negative  GASTROINTESTINAL:  negative for nausea and vomiting  HEMATOLOGIC/LYMPHATIC:  positive for swelling/edema lower extremities  MUSCULOSKELETAL:  negative  NEUROLOGICAL:  negative  BEHAVIOR/PSYCH:  negative    PHYSICAL EXAM:    VITALS:  BP (!) 123/51   Pulse 87   Temp 98.6 °F (37 °C) (Oral)   Resp 18   Ht 5' (1.524 m)   Wt (!) 388 lb (176 kg)   SpO2 98%   BMI 75.78 kg/m² CONSTITUTIONAL:  awake, alert, cooperative, no apparent distress, and appears stated age  EYES:  Lids and lashes normal, pupils equal, round and reactive to light, extra ocular muscles intact, sclera clear, conjunctiva normal  ENT:  Normocephalic, without obvious abnormality, atraumatic, sinuses nontender on palpation, external ears without lesions, oral pharynx with moist mucus membranes, tonsils without erythema or exudates, gums normal and good dentition. NECK:  Supple, symmetrical, trachea midline, no adenopathy, thyroid symmetric, not enlarged and no tenderness, skin normal  HEMATOLOGIC/LYMPHATICS:  no cervical lymphadenopathy  BACK:  Symmetric, no curvature, spinous processes are non-tender on palpation, paraspinous muscles are non-tender on palpation, no costal vertebral tenderness  LUNGS:  No increased work of breathing, good air exchange, clear to auscultation bilaterally, no crackles or wheezing  CARDIOVASCULAR:  Normal apical impulse, regular rate and rhythm, normal S1 and S2, no S3 or S4, and no murmur noted  ABDOMEN:  No scars, normal bowel sounds, soft, non-distended, non-tender, no masses palpated, no hepatosplenomegally  CHEST/BREASTS:  Breasts symmetrical, skin without lesion(s), no nipple retraction or dimpling, no nipple discharge, no masses palpated, no axillary or supraclavicular adenopathy  MUSCULOSKELETAL: Bilateral lymphedema. Fluctuance present just medial to the left knee consistent with abscess. Area of previous spontaneous drainage noted but not currently draining.   NEUROLOGIC: Awake alert and oriented  SKIN:  no bruising or bleeding  DATA:    CBC:   Lab Results   Component Value Date    WBC 9.1 03/15/2022    RBC 3.96 03/15/2022    HGB 9.9 03/15/2022    HCT 30.8 03/15/2022    MCV 77.7 03/15/2022    MCH 25.0 03/15/2022    MCHC 32.1 03/15/2022    RDW 18.4 03/15/2022     03/15/2022     CMP:    Lab Results   Component Value Date     03/15/2022    K 4.3 03/15/2022    K 3.8 01/07/2019     03/15/2022    CO2 20 03/15/2022    BUN 26 03/15/2022    CREATININE 1.32 03/15/2022    GFRAA 49.6 03/15/2022    LABGLOM 41.0 03/15/2022    GLUCOSE 107 03/15/2022    PROT 7.1 03/14/2022    LABALBU 3.1 03/15/2022    CALCIUM 8.6 03/15/2022    BILITOT 0.9 03/14/2022    ALKPHOS 144 03/14/2022    AST 16 03/14/2022    ALT 17 03/14/2022     Radiology Review: Ultrasound left lower extremity reviewed as outlined above    IMPRESSION/RECOMMENDATIONS:      Chronic bilateral lymphedema with left leg abscess    Risks and benefits of incision and drainage with wound VAC placement described. Risks of the procedure include infection, bleeding, and poor wound healing in the backdrop of lymphedema outlined. Despite the risks, she wished to proceed. Consent obtained.

## 2022-03-16 ENCOUNTER — ANESTHESIA (OUTPATIENT)
Dept: OPERATING ROOM | Age: 62
DRG: 364 | End: 2022-03-16
Payer: MEDICAID

## 2022-03-16 ENCOUNTER — ANESTHESIA EVENT (OUTPATIENT)
Dept: OPERATING ROOM | Age: 62
DRG: 364 | End: 2022-03-16
Payer: MEDICAID

## 2022-03-16 VITALS — OXYGEN SATURATION: 94 % | DIASTOLIC BLOOD PRESSURE: 57 MMHG | SYSTOLIC BLOOD PRESSURE: 100 MMHG

## 2022-03-16 LAB
ALBUMIN SERPL-MCNC: 2.9 G/DL (ref 3.5–4.6)
ANION GAP SERPL CALCULATED.3IONS-SCNC: 13 MEQ/L (ref 9–15)
BASOPHILS ABSOLUTE: 0 K/UL (ref 0–0.2)
BASOPHILS RELATIVE PERCENT: 0.3 %
BUN BLDV-MCNC: 25 MG/DL (ref 8–23)
C-REACTIVE PROTEIN, HIGH SENSITIVITY: 260.5 MG/L (ref 0–5)
CALCIUM SERPL-MCNC: 8.9 MG/DL (ref 8.5–9.9)
CHLORIDE BLD-SCNC: 108 MEQ/L (ref 95–107)
CO2: 18 MEQ/L (ref 20–31)
CREAT SERPL-MCNC: 1.37 MG/DL (ref 0.5–0.9)
EOSINOPHILS ABSOLUTE: 0.1 K/UL (ref 0–0.7)
EOSINOPHILS RELATIVE PERCENT: 1.2 %
GFR AFRICAN AMERICAN: 47.5
GFR NON-AFRICAN AMERICAN: 39.3
GLUCOSE BLD-MCNC: 101 MG/DL (ref 70–99)
GLUCOSE BLD-MCNC: 106 MG/DL (ref 70–99)
GLUCOSE BLD-MCNC: 112 MG/DL (ref 70–99)
GLUCOSE BLD-MCNC: 114 MG/DL (ref 70–99)
GLUCOSE BLD-MCNC: 81 MG/DL (ref 70–99)
GLUCOSE BLD-MCNC: 97 MG/DL (ref 70–99)
HCT VFR BLD CALC: 28.9 % (ref 37–47)
HEMOGLOBIN: 9.1 G/DL (ref 12–16)
LYMPHOCYTES ABSOLUTE: 1.3 K/UL (ref 1–4.8)
LYMPHOCYTES RELATIVE PERCENT: 18 %
MAGNESIUM: 2.1 MG/DL (ref 1.7–2.4)
MCH RBC QN AUTO: 24.7 PG (ref 27–31.3)
MCHC RBC AUTO-ENTMCNC: 31.6 % (ref 33–37)
MCV RBC AUTO: 78.4 FL (ref 82–100)
MONOCYTES ABSOLUTE: 0.6 K/UL (ref 0.2–0.8)
MONOCYTES RELATIVE PERCENT: 8.8 %
NEUTROPHILS ABSOLUTE: 5.1 K/UL (ref 1.4–6.5)
NEUTROPHILS RELATIVE PERCENT: 71.7 %
PDW BLD-RTO: 18.5 % (ref 11.5–14.5)
PERFORMED ON: ABNORMAL
PERFORMED ON: NORMAL
PERFORMED ON: NORMAL
PHOSPHORUS: 3 MG/DL (ref 2.3–4.8)
PLATELET # BLD: 188 K/UL (ref 130–400)
POTASSIUM SERPL-SCNC: 4.3 MEQ/L (ref 3.4–4.9)
PROCALCITONIN: 1.39 NG/ML (ref 0–0.15)
RBC # BLD: 3.69 M/UL (ref 4.2–5.4)
SODIUM BLD-SCNC: 139 MEQ/L (ref 135–144)
WBC # BLD: 7.1 K/UL (ref 4.8–10.8)

## 2022-03-16 PROCEDURE — 6370000000 HC RX 637 (ALT 250 FOR IP): Performed by: COLON & RECTAL SURGERY

## 2022-03-16 PROCEDURE — 10061 I&D ABSCESS COMP/MULTIPLE: CPT | Performed by: COLON & RECTAL SURGERY

## 2022-03-16 PROCEDURE — 2500000003 HC RX 250 WO HCPCS: Performed by: INTERNAL MEDICINE

## 2022-03-16 PROCEDURE — 7100000001 HC PACU RECOVERY - ADDTL 15 MIN: Performed by: COLON & RECTAL SURGERY

## 2022-03-16 PROCEDURE — 36415 COLL VENOUS BLD VENIPUNCTURE: CPT

## 2022-03-16 PROCEDURE — 87077 CULTURE AEROBIC IDENTIFY: CPT

## 2022-03-16 PROCEDURE — 2580000003 HC RX 258: Performed by: COLON & RECTAL SURGERY

## 2022-03-16 PROCEDURE — 6370000000 HC RX 637 (ALT 250 FOR IP): Performed by: ANESTHESIOLOGY

## 2022-03-16 PROCEDURE — 3700000001 HC ADD 15 MINUTES (ANESTHESIA): Performed by: COLON & RECTAL SURGERY

## 2022-03-16 PROCEDURE — 0J9P0ZZ DRAINAGE OF LEFT LOWER LEG SUBCUTANEOUS TISSUE AND FASCIA, OPEN APPROACH: ICD-10-PCS | Performed by: COLON & RECTAL SURGERY

## 2022-03-16 PROCEDURE — 80069 RENAL FUNCTION PANEL: CPT

## 2022-03-16 PROCEDURE — 3600000003 HC SURGERY LEVEL 3 BASE: Performed by: COLON & RECTAL SURGERY

## 2022-03-16 PROCEDURE — 97167 OT EVAL HIGH COMPLEX 60 MIN: CPT

## 2022-03-16 PROCEDURE — 84145 PROCALCITONIN (PCT): CPT

## 2022-03-16 PROCEDURE — 6360000002 HC RX W HCPCS: Performed by: ANESTHESIOLOGY

## 2022-03-16 PROCEDURE — 86141 C-REACTIVE PROTEIN HS: CPT

## 2022-03-16 PROCEDURE — A4217 STERILE WATER/SALINE, 500 ML: HCPCS | Performed by: COLON & RECTAL SURGERY

## 2022-03-16 PROCEDURE — 2580000003 HC RX 258

## 2022-03-16 PROCEDURE — 2580000003 HC RX 258: Performed by: INTERNAL MEDICINE

## 2022-03-16 PROCEDURE — 7100000000 HC PACU RECOVERY - FIRST 15 MIN: Performed by: COLON & RECTAL SURGERY

## 2022-03-16 PROCEDURE — 85025 COMPLETE CBC W/AUTO DIFF WBC: CPT

## 2022-03-16 PROCEDURE — 2500000003 HC RX 250 WO HCPCS: Performed by: COLON & RECTAL SURGERY

## 2022-03-16 PROCEDURE — 97162 PT EVAL MOD COMPLEX 30 MIN: CPT

## 2022-03-16 PROCEDURE — 83735 ASSAY OF MAGNESIUM: CPT

## 2022-03-16 PROCEDURE — 2709999900 HC NON-CHARGEABLE SUPPLY: Performed by: COLON & RECTAL SURGERY

## 2022-03-16 PROCEDURE — 6360000002 HC RX W HCPCS: Performed by: INTERNAL MEDICINE

## 2022-03-16 PROCEDURE — 3700000000 HC ANESTHESIA ATTENDED CARE: Performed by: COLON & RECTAL SURGERY

## 2022-03-16 PROCEDURE — 6360000002 HC RX W HCPCS: Performed by: COLON & RECTAL SURGERY

## 2022-03-16 PROCEDURE — 1210000000 HC MED SURG R&B

## 2022-03-16 PROCEDURE — 99213 OFFICE O/P EST LOW 20 MIN: CPT

## 2022-03-16 PROCEDURE — 97606 NEG PRS WND THER DME>50 SQCM: CPT | Performed by: COLON & RECTAL SURGERY

## 2022-03-16 PROCEDURE — 87070 CULTURE OTHR SPECIMN AEROBIC: CPT

## 2022-03-16 PROCEDURE — 99232 SBSQ HOSP IP/OBS MODERATE 35: CPT | Performed by: INTERNAL MEDICINE

## 2022-03-16 PROCEDURE — 3600000013 HC SURGERY LEVEL 3 ADDTL 15MIN: Performed by: COLON & RECTAL SURGERY

## 2022-03-16 PROCEDURE — 97535 SELF CARE MNGMENT TRAINING: CPT

## 2022-03-16 PROCEDURE — 87075 CULTR BACTERIA EXCEPT BLOOD: CPT

## 2022-03-16 RX ORDER — ONDANSETRON 2 MG/ML
4 INJECTION INTRAMUSCULAR; INTRAVENOUS
Status: DISCONTINUED | OUTPATIENT
Start: 2022-03-16 | End: 2022-03-16 | Stop reason: HOSPADM

## 2022-03-16 RX ORDER — OXYCODONE HYDROCHLORIDE 5 MG/1
5 TABLET ORAL PRN
Status: COMPLETED | OUTPATIENT
Start: 2022-03-16 | End: 2022-03-16

## 2022-03-16 RX ORDER — SODIUM CHLORIDE 9 MG/ML
25 INJECTION, SOLUTION INTRAVENOUS PRN
Status: DISCONTINUED | OUTPATIENT
Start: 2022-03-16 | End: 2022-03-16 | Stop reason: HOSPADM

## 2022-03-16 RX ORDER — OXYCODONE HYDROCHLORIDE AND ACETAMINOPHEN 5; 325 MG/1; MG/1
1 TABLET ORAL EVERY 4 HOURS PRN
Status: COMPLETED | OUTPATIENT
Start: 2022-03-16 | End: 2022-03-21

## 2022-03-16 RX ORDER — PROPOFOL 10 MG/ML
INJECTION, EMULSION INTRAVENOUS PRN
Status: DISCONTINUED | OUTPATIENT
Start: 2022-03-16 | End: 2022-03-16 | Stop reason: SDUPTHER

## 2022-03-16 RX ORDER — DIPHENHYDRAMINE HYDROCHLORIDE 50 MG/ML
12.5 INJECTION INTRAMUSCULAR; INTRAVENOUS
Status: ACTIVE | OUTPATIENT
Start: 2022-03-16 | End: 2022-03-16

## 2022-03-16 RX ORDER — SODIUM CHLORIDE, SODIUM LACTATE, POTASSIUM CHLORIDE, CALCIUM CHLORIDE 600; 310; 30; 20 MG/100ML; MG/100ML; MG/100ML; MG/100ML
INJECTION, SOLUTION INTRAVENOUS CONTINUOUS
Status: DISCONTINUED | OUTPATIENT
Start: 2022-03-16 | End: 2022-03-16

## 2022-03-16 RX ORDER — MEPERIDINE HYDROCHLORIDE 25 MG/ML
12.5 INJECTION INTRAMUSCULAR; INTRAVENOUS; SUBCUTANEOUS EVERY 5 MIN PRN
Status: DISCONTINUED | OUTPATIENT
Start: 2022-03-16 | End: 2022-03-16 | Stop reason: HOSPADM

## 2022-03-16 RX ORDER — FENTANYL CITRATE 50 UG/ML
50 INJECTION, SOLUTION INTRAMUSCULAR; INTRAVENOUS EVERY 10 MIN PRN
Status: DISCONTINUED | OUTPATIENT
Start: 2022-03-16 | End: 2022-03-16 | Stop reason: HOSPADM

## 2022-03-16 RX ORDER — FENTANYL CITRATE 50 UG/ML
INJECTION, SOLUTION INTRAMUSCULAR; INTRAVENOUS PRN
Status: DISCONTINUED | OUTPATIENT
Start: 2022-03-16 | End: 2022-03-16 | Stop reason: SDUPTHER

## 2022-03-16 RX ORDER — MAGNESIUM HYDROXIDE 1200 MG/15ML
LIQUID ORAL CONTINUOUS PRN
Status: COMPLETED | OUTPATIENT
Start: 2022-03-16 | End: 2022-03-16

## 2022-03-16 RX ORDER — OXYCODONE HYDROCHLORIDE 5 MG/1
10 TABLET ORAL PRN
Status: COMPLETED | OUTPATIENT
Start: 2022-03-16 | End: 2022-03-16

## 2022-03-16 RX ORDER — SODIUM CHLORIDE 0.9 % (FLUSH) 0.9 %
5-40 SYRINGE (ML) INJECTION PRN
Status: DISCONTINUED | OUTPATIENT
Start: 2022-03-16 | End: 2022-03-16 | Stop reason: HOSPADM

## 2022-03-16 RX ORDER — MIDAZOLAM HYDROCHLORIDE 1 MG/ML
INJECTION INTRAMUSCULAR; INTRAVENOUS PRN
Status: DISCONTINUED | OUTPATIENT
Start: 2022-03-16 | End: 2022-03-16 | Stop reason: SDUPTHER

## 2022-03-16 RX ORDER — SODIUM CHLORIDE, SODIUM LACTATE, POTASSIUM CHLORIDE, CALCIUM CHLORIDE 600; 310; 30; 20 MG/100ML; MG/100ML; MG/100ML; MG/100ML
INJECTION, SOLUTION INTRAVENOUS
Status: COMPLETED
Start: 2022-03-16 | End: 2022-03-16

## 2022-03-16 RX ORDER — SODIUM CHLORIDE 0.9 % (FLUSH) 0.9 %
5-40 SYRINGE (ML) INJECTION EVERY 12 HOURS SCHEDULED
Status: DISCONTINUED | OUTPATIENT
Start: 2022-03-16 | End: 2022-03-16 | Stop reason: HOSPADM

## 2022-03-16 RX ORDER — OXYCODONE HYDROCHLORIDE AND ACETAMINOPHEN 5; 325 MG/1; MG/1
2 TABLET ORAL EVERY 4 HOURS PRN
Status: COMPLETED | OUTPATIENT
Start: 2022-03-16 | End: 2022-03-21

## 2022-03-16 RX ORDER — METOCLOPRAMIDE HYDROCHLORIDE 5 MG/ML
10 INJECTION INTRAMUSCULAR; INTRAVENOUS
Status: DISCONTINUED | OUTPATIENT
Start: 2022-03-16 | End: 2022-03-16 | Stop reason: HOSPADM

## 2022-03-16 RX ORDER — ONDANSETRON 2 MG/ML
INJECTION INTRAMUSCULAR; INTRAVENOUS PRN
Status: DISCONTINUED | OUTPATIENT
Start: 2022-03-16 | End: 2022-03-16 | Stop reason: SDUPTHER

## 2022-03-16 RX ADMIN — DOXYCYCLINE HYCLATE 100 MG: 100 CAPSULE ORAL at 22:05

## 2022-03-16 RX ADMIN — LACTOBACILLUS TAB 1 TABLET: TAB at 22:10

## 2022-03-16 RX ADMIN — PHENYLEPHRINE HYDROCHLORIDE 200 MCG: 10 INJECTION INTRAVENOUS at 13:13

## 2022-03-16 RX ADMIN — PHENYLEPHRINE HYDROCHLORIDE 200 MCG: 10 INJECTION INTRAVENOUS at 13:34

## 2022-03-16 RX ADMIN — MIDAZOLAM HYDROCHLORIDE 2 MG: 1 INJECTION, SOLUTION INTRAMUSCULAR; INTRAVENOUS at 13:14

## 2022-03-16 RX ADMIN — CLINDAMYCIN IN 5 PERCENT DEXTROSE 900 MG: 18 INJECTION, SOLUTION INTRAVENOUS at 11:34

## 2022-03-16 RX ADMIN — MICONAZOLE NITRATE: 2 POWDER TOPICAL at 22:11

## 2022-03-16 RX ADMIN — SODIUM CHLORIDE, POTASSIUM CHLORIDE, SODIUM LACTATE AND CALCIUM CHLORIDE 1000 ML: 600; 310; 30; 20 INJECTION, SOLUTION INTRAVENOUS at 11:31

## 2022-03-16 RX ADMIN — ONDANSETRON 4 MG: 2 INJECTION INTRAMUSCULAR; INTRAVENOUS at 13:06

## 2022-03-16 RX ADMIN — PROPOFOL 200 MG: 10 INJECTION, EMULSION INTRAVENOUS at 13:06

## 2022-03-16 RX ADMIN — MORPHINE SULFATE 2 MG: 2 INJECTION, SOLUTION INTRAMUSCULAR; INTRAVENOUS at 06:09

## 2022-03-16 RX ADMIN — CLINDAMYCIN IN 5 PERCENT DEXTROSE 900 MG: 18 INJECTION, SOLUTION INTRAVENOUS at 18:11

## 2022-03-16 RX ADMIN — CLINDAMYCIN IN 5 PERCENT DEXTROSE 900 MG: 18 INJECTION, SOLUTION INTRAVENOUS at 06:09

## 2022-03-16 RX ADMIN — OXYCODONE 5 MG: 5 TABLET ORAL at 14:53

## 2022-03-16 RX ADMIN — FENTANYL CITRATE 100 MCG: 50 INJECTION, SOLUTION INTRAMUSCULAR; INTRAVENOUS at 13:06

## 2022-03-16 RX ADMIN — TOPIRAMATE 25 MG: 25 TABLET, FILM COATED ORAL at 22:05

## 2022-03-16 RX ADMIN — Medication 10 ML: at 08:50

## 2022-03-16 RX ADMIN — FENTANYL CITRATE 50 MCG: 50 INJECTION INTRAMUSCULAR; INTRAVENOUS at 14:17

## 2022-03-16 RX ADMIN — FENTANYL CITRATE 50 MCG: 50 INJECTION INTRAMUSCULAR; INTRAVENOUS at 14:44

## 2022-03-16 RX ADMIN — ENOXAPARIN SODIUM 60 MG: 60 INJECTION SUBCUTANEOUS at 18:06

## 2022-03-16 RX ADMIN — MICONAZOLE NITRATE: 2 POWDER TOPICAL at 08:49

## 2022-03-16 RX ADMIN — Medication 10 ML: at 22:11

## 2022-03-16 RX ADMIN — OXYCODONE AND ACETAMINOPHEN 2 TABLET: 5; 325 TABLET ORAL at 22:05

## 2022-03-16 ASSESSMENT — PAIN SCALES - GENERAL
PAINLEVEL_OUTOF10: 10
PAINLEVEL_OUTOF10: 7
PAINLEVEL_OUTOF10: 7
PAINLEVEL_OUTOF10: 2
PAINLEVEL_OUTOF10: 7
PAINLEVEL_OUTOF10: 8

## 2022-03-16 ASSESSMENT — PULMONARY FUNCTION TESTS
PIF_VALUE: 30
PIF_VALUE: 3
PIF_VALUE: 35
PIF_VALUE: 29
PIF_VALUE: 1
PIF_VALUE: 1
PIF_VALUE: 30
PIF_VALUE: 30
PIF_VALUE: 5
PIF_VALUE: 29
PIF_VALUE: 30
PIF_VALUE: 18
PIF_VALUE: 29
PIF_VALUE: 30
PIF_VALUE: 30
PIF_VALUE: 35
PIF_VALUE: 33
PIF_VALUE: 29
PIF_VALUE: 30
PIF_VALUE: 3
PIF_VALUE: 1
PIF_VALUE: 29
PIF_VALUE: 29
PIF_VALUE: 30
PIF_VALUE: 1
PIF_VALUE: 31
PIF_VALUE: 4
PIF_VALUE: 4
PIF_VALUE: 3
PIF_VALUE: 1
PIF_VALUE: 23
PIF_VALUE: 30
PIF_VALUE: 8
PIF_VALUE: 30
PIF_VALUE: 2
PIF_VALUE: 31
PIF_VALUE: 33
PIF_VALUE: 29
PIF_VALUE: 29
PIF_VALUE: 1
PIF_VALUE: 30
PIF_VALUE: 2
PIF_VALUE: 1
PIF_VALUE: 30
PIF_VALUE: 29
PIF_VALUE: 1
PIF_VALUE: 3
PIF_VALUE: 30

## 2022-03-16 ASSESSMENT — ENCOUNTER SYMPTOMS
COLOR CHANGE: 1
RESPIRATORY NEGATIVE: 1
GASTROINTESTINAL NEGATIVE: 1

## 2022-03-16 ASSESSMENT — PAIN DESCRIPTION - LOCATION: LOCATION: LEG

## 2022-03-16 ASSESSMENT — PAIN - FUNCTIONAL ASSESSMENT: PAIN_FUNCTIONAL_ASSESSMENT: 0-10

## 2022-03-16 ASSESSMENT — PAIN DESCRIPTION - ORIENTATION: ORIENTATION: LEFT

## 2022-03-16 ASSESSMENT — PAIN DESCRIPTION - PAIN TYPE: TYPE: ACUTE PAIN

## 2022-03-16 NOTE — PROGRESS NOTES
Infectious Disease     Patient Name: Nehemias Sparks  Date: 3/16/2022  YOB: 1960  Medical Record Number: [id number]        Left lower extremity cellulitis  Left knee abscess      To surgery today drainage of left leg abscess  And wound VAC placement cultures pending              US DUP LOWER EXTREMITY LEFT YVETTE [[id number]] Collected: 03/14/22 1753 Updated: 03/14/22 1802 Narrative:  EXAMINATION: US SOFT TISSUE LIMITED AREA, US DUP LOWER EXTREMITY LEFT YVETET     COMPARISON:None     CLINICAL HISTORY:  over left knee scab ? abscess      FINDINGS:Targeted ultrasound scans of the left anterior knee. Significant subcutaneous edema with a 6.7 x 7.5 x 1.9 cm complex hypoechoic area lateral to the skin defect on the left knee that is compatible with abscess or complex hematoma. Impression:  7.5 x 6.7 x 1.9 cm complex fluid collection involving subcutaneous tissues of left knee consistent with abscess or hematoma  .    Impression:  DEGENERATIVE CHANGE LEFT KNEE, AND LEFT HEEL. NO FRACTURE           Review of Systems   Constitutional: Negative for chills, diaphoresis, fatigue and fever. Respiratory: Negative. Cardiovascular: Negative. Gastrointestinal: Negative. Skin: Positive for color change.        Review of Systems: All 14 review of systems negative other than as stated above    Social History     Tobacco Use    Smoking status: Never Smoker    Smokeless tobacco: Never Used   Substance Use Topics    Alcohol use: Yes     Comment: occassionally    Drug use: No         Past Medical History:   Diagnosis Date    B[facility] depression ([facility]     Depression     Diabetes mellitus ([facility]     Hypertension     PTSD (post-traumatic stress disorder)     Thyroid disease            Past Surgical History:   Procedure Laterality Date    BREAST LUMPECTOMY Right     CERVICAL LAMINECTOMY      C2-C6    INCISION AND DRAINAGE Left 1/7/2019    INCISION AND DRAINAGE LEFT PERIRECTAL  ABSCESS performed by Gomez Jane Consuelo Narayan MD at [address] [place] [address]           No current facility-administered medications on file prior to encounter. Current Outpatient Medications on File Prior to Encounter   Medication Sig Dispense Refill    TRULICITY 3 NX/4.3CX SOPN Inject 3 mg into the skin once a week Every Monday      lisinopril (PRINIVIL;ZESTRIL) 10 MG tablet Take 10 mg by mouth in the morning and at bedtime      topiramate (TOPAMAX) 25 MG tablet Take 25 mg by mouth in the morning and at bedtime 2 tabs am 1 tab HS      Cholecalciferol (VITAMIN D) 2000 units CAPS capsule Take by mouth      levothyroxine (SYNTHROID) 25 MCG tablet Take 50 mcg by mouth Daily          Allergies   Allergen Reactions    Bactrim [Sulfamethoxazole-Trimethoprim] Rash    Penicillins Swelling    Prednisone Other (See Comments)     States made her angry and manic    Vancomycin Other (See Comments)     Put her in renal failure per pt         Family History   Problem Relation Age of Onset    Bipolar Disorder Mother     Bipolar Disorder Sister     Bipolar Disorder Brother          Physical Exam:      Physical Exam  Cardiovascular:      Heart sounds: Normal heart sounds. No murmur heard. Pulmonary:      Effort: No respiratory distress. Breath sounds: No wheezing or rales. Abdominal:      General: Abdomen is flat. Bowel sounds are normal. There is no distension. Palpations: Abdomen is soft. Tenderness: There is no abdominal tenderness. There is no guarding. Musculoskeletal:         General: Swelling and tenderness present. Blood pressure (!) 128/58, pulse 81, temperature 99.1 °F (37.3 °C), temperature source Temporal, resp. rate 18, height 5' (1.524 m), weight (!) 388 lb (176 kg), SpO2 94 %.       .   Lab Results   Component Value Date    WBC 7.1 03/16/2022    HGB 9.1 (L) 03/16/2022    HCT 28.9 (L) 03/16/2022    MCV 78.4 (L) 03/16/2022     03/16/2022     Lab Results   Component Value Date     03/16/2022    K 4.3 03/16/2022    K 3.8 01/07/2019     03/16/2022    CO2 18 03/16/2022    BUN 25 03/16/2022    CREATININE 1.37 03/16/2022    GLUCOSE 106 03/16/2022    CALCIUM 8.9 03/16/2022                ASSESSMENT:  Patient Active Problem List   Diagnosis    Asthma, chronic    C[facility] ([facility]    Cervical vertebral fusion    Diabetes mellitus due to underlying condition with diabetic polyneuropathy ([facility]    Diabetes mellitus type II, controlled ([facility]    HTN (hypertension)    Hyperlipidemia    Metabolic syndrome    Stasis dermatitis    Sepsis ([facility]    Perirectal abscess    Bipolar 1 disorder, depressed, severe (HCC)    Anxiety disorder, unspecified    Chronic post-traumatic stress disorder (PTSD)    Abscess    Cellulitis and abscess of left lower extremity    Lymphedema due to venous disease    Venous stasis ulcer of right calf limited to breakdown of skin without varicose veins (HCC)         PLAN:    Left lower extremity cellulitis  Left leg abscess    On clindamycin doxycycline

## 2022-03-16 NOTE — PROGRESS NOTES
Physical Therapy Med Surg Initial Assessment  Fac[facility]/Department[facility]  [facility]       NAME: Stewart Nelson  : 1960 (62 y.o.)  MRN: [id number]  CODE STATUS: Full Code    Date of Service: 3/16/2022    Patient Diagnosis(es): Abscess [L02.91]  Septicemia ([address] [A41.9]  Cellulitis and abscess of left lower extremity [L03.116, L02.416]   Chief Complaint   Patient presents with    Leg Pain     Patient Active Problem List    Diagnosis Date Noted    Lymphedema due to venous disease 03/15/2022    Venous stasis ulcer of right calf limited to breakdown of skin without varicose veins ([address] 03/15/2022    Cellulitis and abscess of left lower extremity     Abscess 2022    Anxiety disorder, unspecified 2019    Chronic post-traumatic stress disorder (PTSD) 2019    Bipolar 1 disorder, depressed, severe ([address] 2019    Perirectal abscess     Asthma, chronic 2019    HTN (hypertension) 2019    Hyperlipidemia 2019    Stasis dermatitis 2019    Sepsis ([address] 2019    Cervical myelopathy ([address] 2017    Diabetes mellitus due to underlying condition with diabetic polyneuropathy ([address] 2017    Cervical vertebral fusion 2017    Diabetes mellitus type II, controlled ([address]     Metabolic syndrome         Past Medical History:   Diagnosis Date    Bipolar depression ([address]     Depression     Diabetes mellitus ([address]     Hypertension     PTSD (post-traumatic stress disorder)     Thyroid disease      Past Surgical History:   Procedure Laterality Date    BREAST LUMPECTOMY Right     CERVICAL LAMINECTOMY      C2-C6    INCISION AND DRAINAGE Left 2019    INCISION AND DRAINAGE LEFT PERIRECTAL  ABSCESS performed by Nadiya Ochoa MD at [address] [place] [address]         Chart Reviewed: Yes  Patient assessed for rehabilitation services?: Yes  Family / Caregiver Present: No  General Comment  Comments: Pt resting in bed - agreeable to PT evaluation    Restrictions:  Restrictions/Precautions: Fall Risk     SUBJECTIVE: Subjective: \"I'm afraid my legs won't work. \"    Pain  Pre Treatment Pain Screening  Pain at present: 2  Scale Used: Numeric Score  Intervention List: Patient able to continue with treatment;Nurse/physician notified; Patient declined any intervention  Comments / Details: B Les    Post Treatment Pain Screening:   Pain Assessment  Pain Assessment:  (unchanged)    Prior Level of Function:  Social/Functional History  Lives With: Alone  Type of Home: House  Home Layout: Two level,Able to Live on Main level with bedroom/bathroom,Performs ADL's on one level  Home Access: Stairs to enter with rails  Bathroom Shower/Tub: Walk-in shower  Bathroom Equipment: Hand-held shower  Home Equipment: Rolling walker  ADL Assistance: Independent (Assist PRN with ADLs, however pt states that she has very little help currently)  Homemaking Assistance: Independent  Ambulation Assistance: Independent (with [employer])  Transfer Assistance: Independent  Additional Comments: Pt admits to multiple falls recently and has been struggling to manage her household and ADLs over the past few months. OBJECTIVE:   Vision: Within Functional Limits  Hearing: Within functional limits    Cognition:  Overall Orientation Status: Within Functional Limits  Follows Commands: Within Functional Limits    Observation/Palpation  Observation: No acute distress. Pt appearing anxious about mobility however motivated to get OOB. B calves dressed in gauze wrap.  Erythema B LEs.    ROM:  RLE General PROM: Obtains neutral supine position and seated position, however end ROM limited by loft tissue approximation and pain  LLE General PROM: Obtains neutral supine position and seated position, however end ROM limited by loft tissue approximation and pain    Strength:  Strength RLE  Comment: Grossly 2-/5  Strength LLE  Comment: Grossly 2-/5  Strength Other  Other: Trunk strength grossly 2/5. Strength generally limited by pain. Neuro:  Balance  Sitting - Static: Good  Sitting - Dynamic: Good;-  Standing - Static: Fair;+  Standing - Dynamic: Fair     Motor Control  Gross Motor?: WFL  Sensation  Overall Sensation Status:  (Pt c/o B LE and UE numbness)    Bed mobility  Rolling to Left: Maximum assistance;2 Person assistance  Rolling to Right: Maximum assistance;2 Person assistance  Supine to Sit: Maximum assistance;2 Person assistance  Sit to Supine: Maximum assistance;2 Person assistance  Scooting: Maximal assistance  Comment: Step by step cues for sequencing all transitions. Pt requires significant assist to mobilize LEs off/on bed and support for trunk. Instructed in effective use of bedrails. Transfers  Sit to Stand: Contact guard assistance;Stand by assistance  Stand to sit: Contact guard assistance;Stand by assistance  Bed to Chair: Contact guard assistance;Stand by assistance  Comment: Verbal cues for sequencing. Encouragement provided throughout for relaxation. Ambulation 1  Surface: level tile  Device: Rolling Walker  Assistance: Contact guard assistance  Quality of Gait: WBOS with increased lateral excursion. Verbal cues for breathing techniques. Distance: 10ft X 2              Activity Tolerance  Activity Tolerance: Patient Tolerated treatment well          PT Education  PT Education: Goals;PT Role;Plan of Care;Transfer Training;Functional Mobility Training;Gait Training    ASSESSMENT:   Body structures, Functions, Activity limitations: Decreased functional mobility ; Decreased ADL status; Decreased strength;Decreased safe awareness;Decreased balance;Decreased endurance;Decreased sensation;Decreased ROM; Increased pain  Decision Making: Medium Complexity  History: High  Exam: High  Clinical Presentation: Med    Prognosis: Good  Barriers to Learning: none    DISCHARGE RECOMMENDATIONS:  Discharge Recommendations: Continue to assess pending progress,Patient would benefit from continued therapy after discharge    Assessment: Continued PT indicated to progress mobility and facilitate DC at highest level of indep and safety. Rec therapy stay prior to DC home. REQUIRES PT FOLLOW UP: Yes      PLAN OF CARE:  Plan  Times per week: 3-6  Current Treatment Recommendations: Strengthening,Balance Training,Functional Mobility Training,Transfer Training,Gait Training,Stair training,Endurance Training,Neuromuscular Re-education,Patient/Caregiver Education & Training,Equipment Evaluation, Education, & procurement,Home Exercise Program,Safety Education & Training,Manual Therapy - Soft Tissue Mobilization,ROM,Positioning  Safety Devices  Type of devices: All fall risk precautions in place    Goals:  Long term goals  Long term goal 1: Pt to complete bed mobility with CGA  Long term goal 2: Pt to complete transfers with indep  Long term goal 3: Pt to ambulate 50-150ft with [employer] indep  Long term goal 4: Pt to complete HEP with indep    AMPAC (6 CLICK) BASIC MOBILITY  AM-PAC Inpatient Mobility Raw Score : 13     Therapy Time:   Individual   Time In 0850   Time Out 0931   Minutes 41   Timed Code Treatment Minutes: 8 Minutes (transfers 7min; 1min gait)       Jose L Chino, PT, 03/16/22 at 9:48 AM         Definitions for assistance levels  Independent = pt does not require any physical supervision or assistance from another person for activity completion. Device may be needed.   Stand by assistance = pt requires verbal cues or instructions from another person, close to but not touching, to perform the activity  Minimal assistance= pt performs 75% or more of the activity; assistance is required to complete the activity  Moderate assistance= pt performs 50% of the activity; assistance is required to complete the activity  Maximal assistance = pt performs 25% of the activity; assistance is required to complete the activity  Dependent = pt requires total physical assistance to accomplish the task

## 2022-03-16 NOTE — ANESTHESIA PRE PROCEDURE
Department of Anesthesiology  Preprocedure Note       Name:  Marinda Felty   Age:  62 y.o.  :  1960                                          MRN:  [id number]         Date:  3/16/2022      Surgeon: Landy Modi):  Cedric Reynoso MD    Procedure: Procedure(s):  INCISION AND DRAINAGE OF LEFT LEG ABSCESS WITH WOUND VAC PLACEMENT ROOM 468    Medications prior to admission:   Prior to Admission medications    Medication Sig Start Date End Date Taking?  Authorizing Provider   TRULICITY 3 VB/9.7MC SOPN Inject 3 mg into the skin once a week Every 22  Yes Historical Provider, MD   lisinopril (PRINIVIL;ZESTRIL) 10 MG tablet Take 10 mg by mouth in the morning and at bedtime 22  Yes Historical Provider, MD   topiramate (TOPAMAX) 25 MG tablet Take 25 mg by mouth in the morning and at bedtime 2 tabs am 1 tab HS 3/7/22  Yes Historical Provider, MD   Cholecalciferol (VITAMIN D) 2000 units CAPS capsule Take by mouth   Yes Historical Provider, MD   levothyroxine (SYNTHROID) 25 MCG tablet Take 50 mcg by mouth Daily    Yes Historical Provider, MD       Current medications:    Current Facility-Administered Medications   Medication Dose Route Frequency Provider Last Rate Last Admin    sodium chloride flush 0.9 % injection 5-40 mL  5-40 mL IntraVENous 2 times per day Alejandra Barton MD        sodium chloride flush 0.9 % injection 5-40 mL  5-40 mL IntraVENous PRN Alejandra Barton MD        0.9 % sodium chloride infusion  25 mL IntraVENous PRN Alejandra Barton MD        meperidine (DEMEROL) injection 12.5 mg  12.5 mg IntraVENous Q5 Min PRN Alejandra Barton MD        fentaNYL (SUBLIMAZE) injection 50 mcg  50 mcg IntraVENous Q10 Min PRN Alejandra Barton MD        HYDROmorphone (DILAUDID) injection 0.5 mg  0.5 mg IntraVENous Q10 Min PRN Alejandra Barton MD        oxyCODONE (ROXICODONE) immediate release tablet 5 mg  5 mg Oral PRN Alejandra Barton MD        Or   [facility] oxyCODONE (ROXICODONE) immediate release tablet 10 mg  10 mg Oral PRN Deanna Nielsen MD        ondansetron [facility]) injection 4 mg  4 mg IntraVENous Once PRN Deanna Nielsen MD        metoclopramide [facility]) injection 10 mg  10 mg IntraVENous Once PRN Deanna Nielsen MD        diphenhydrAMINE (BENADRYL) injection 12.5 mg  12.5 mg IntraVENous Once PRN Deanna Nielsen MD        miconazole (MICOTIN) 2 % powder   Topical BID BRAD Senior - CNP   Given at 03/15/22 2016    doxycycline hyclate (VIBRAMYCIN) capsule 100 mg  100 mg Oral 2 times per day Roosvelt Goodpasture, MD        lactobacillus acidophilus [facility]) 1 tablet  1 tablet Oral BID Roosvelt Goodpasture, MD   1 tablet at 03/15/22 2017    topiramate (TOPAMAX) tablet 50 mg  50 mg Oral QAM Monica Dominguez MD        And    topiramate (TOPAMAX) tablet 25 mg  25 mg Oral Nightly Monica Dominguez MD   25 mg at 03/15/22 2017    sodium chloride flush 0.9 % injection 5-40 mL  5-40 mL IntraVENous 2 times per day Monica Dominguez MD   10 mL at 03/15/22 0821    sodium chloride flush 0.9 % injection 5-40 mL  5-40 mL IntraVENous PRN Monica Dominguez MD        0.9 % sodium chloride infusion  25 mL IntraVENous PRN Monica Dominguez MD        [Held by provider] enoxaparin (LOVENOX) injection 40 mg  40 mg SubCUTAneous Daily Monica Dominguez MD   40 mg at 03/15/22 0821    ondansetron (ZOFRAN-ODT) disintegrating tablet 4 mg  4 mg Oral Q8H PRN Monica Dominguez MD        Or    ondansetron (ZOFRAN) injection 4 mg  4 mg IntraVENous Q6H PRN Monica Dominguez MD        polyethylene glycol (GLYCOLAX) packet 17 g  17 g Oral Daily PRN Monica Dominguez MD        acetaminophen (TYLENOL) tablet 650 mg  650 mg Oral Q6H PRN Monica Dominguez MD        Or    acetaminophen (TYLENOL) suppository 650 mg  650 mg Rectal Q6H PRN Monica Dominguez MD        glucose (GLUTOSE) 40 % oral gel 15 g  15 g Oral PRN Monica Dominguez MD        glucagon (rDNA) injection 1 mg  1 mg IntraMUSCular PRN Kira Lewis MD        dextrose 5 % solution  100 mL/hr IntraVENous PRN Kira Lewis MD        insulin lispro (HUMALOG) injection vial 0-6 Units  0-6 Units SubCUTAneous TID WC Kira Lewis MD        insulin lispro (HUMALOG) injection vial 0-3 Units  0-3 Units SubCUTAneous Nightly Kira Lewis MD   1 Units at 03/15/22 2025    dextrose bolus (hypoglycemia) 10% 125 mL  125 mL IntraVENous PRN Kira Lewis MD        Or    dextrose bolus (hypoglycemia) 10% 250 mL  250 mL IntraVENous PRN Kira Lewis MD        clindamycin (CLEOCIN) 900 mg in dextrose 5 % 50 mL IVPB  900 mg IntraVENous Q6H Kira Lewis MD   Paused at 03/16/22 0610    morphine (PF) injection 2 mg  2 mg IntraVENous Q3H PRN Kira Lewis MD   2 mg at 03/16/22 0609    levothyroxine (SYNTHROID) tablet 50 mcg  50 mcg Oral Daily Karina Mark D Sedar, DO   50 mcg at 03/15/22 0700       Allergies:     Allergies   Allergen Reactions    Bactrim [Sulfamethoxazole-Trimethoprim] Rash    Penicillins Swelling    Prednisone Other (See Comments)     States made her angry and manic    Vancomycin Other (See Comments)     Put her in renal failure per pt       Problem List:    Patient Active Problem List   Diagnosis Code    Asthma, chronic J45.909    C[address]ical myelopathy ([facility] G95.9    Cervical vertebral fusion M43.22    Diabetes mellitus due to underlying condition with diabetic polyneuropathy ([facility] E08.42    Diabetes mellitus type II, controlled ([facility] E11.9    HTN (hypertension) I10    Hyperlipidemia S01.9    Metabolic syndrome B51.15    Stasis dermatitis I87.2    Sepsis ([facility] A41.9    Perirectal abscess K61.1    Bipolar 1 disorder, depressed, severe (HCC) F31.4    Anxiety disorder, unspecified F41.9    Chronic post-traumatic stress disorder (PTSD) F43.12    Abscess L02.91    Cellulitis and abscess of left lower extremity L03.116, L02.416    Lymphedema due to venous disease I89.0, I99.9    Venous stasis ulcer of right calf limited to breakdown of skin without varicose veins (HCC) I87.2, L97.211       Past Medical History:        Diagnosis Date    B[facility] depression [facility]     Depression     Diabetes mellitus ([facility]     Hypertension     PTSD (post-traumatic stress disorder)     Thyroid disease        Past Surgical History:        Procedure Laterality Date    BREAST LUMPECTOMY Right     CERVICAL LAMINECTOMY      C2-C6    INCISION AND DRAINAGE Left 1/7/2019    INCISION AND DRAINAGE LEFT PERIRECTAL  ABSCESS performed by Bronwyn Stein MD [facility]         Social History:    Social History     Tobacco Use    Smoking status: Never Smoker    Smokeless tobacco: Never Used   Substance Use Topics    Alcohol use: Yes     Comment: occassionally                                Counseling given: Not Answered      Vital Signs (Current):   Vitals:    03/14/22 2030 03/14/22 2156 03/15/22 0738 03/15/22 1924   BP: (!) 126/58 (!) 129/49 (!) 123/51 (!) 129/52   Pulse: 89 87 87 86   Resp: 16 18 18 16   Temp:  98.6 °F (37 °C) 98.6 °F (37 °C) 99 °F (37.2 °C)   TempSrc:  Oral Oral Oral   SpO2: 98% 98% 98% 97%   Weight:       Height:                                                  BP Readings from Last 3 Encounters:   03/15/22 (!) 129/52   11/19/21 (!) 157/54   01/18/19 (!) 155/66       NPO Status:                                                                                 BMI:   Wt Readings from Last 3 Encounters:   03/14/22 (!) 388 lb (176 kg)   11/19/21 (!) 380 lb (172.4 kg)   01/22/19 (!) 373 lb (169.2 kg)     Body mass index is 75.78 kg/m².     CBC:   Lab Results   Component Value Date    WBC 7.1 03/16/2022    RBC 3.69 03/16/2022    HGB 9.1 03/16/2022    HCT 28.9 03/16/2022    MCV 78.4 03/16/2022    RDW 18.5 03/16/2022     03/16/2022       CMP:   Lab Results   Component Value Date     03/16/2022    K 4.3 03/16/2022    K 3.8 01/07/2019     03/16/2022    CO2 18 03/16/2022    BUN 25 03/16/2022    CREATININE 1.37 03/16/2022    GFRAA 47.5 03/16/2022    LABGLOM 39.3 03/16/2022    GLUCOSE 106 03/16/2022    PROT 7.1 03/14/2022    CALCIUM 8.9 03/16/2022    BILITOT 0.9 03/14/2022    ALKPHOS 144 03/14/2022    AST 16 03/14/2022    ALT 17 03/14/2022       POC Tests:   Recent Labs     03/15/22  2015   POCGLU 143*       Coags:   Lab Results   Component Value Date    PROTIME 12.1 01/06/2019    INR 1.2 01/06/2019    APTT 34.3 01/06/2019       HCG (If Applicable): No results found for: PREGTESTUR, PREGSERUM, HCG, HCGQUANT     ABGs: No results found for: PHART, PO2ART, FFZ4MVV, TRN4KYX, BEART, M3FDEEYF     Type & Screen (If Applicable):  No results found for: LABABO, LABRH    Drug/Infectious Status (If Applicable):  No results found for: HIV, HEPCAB    COVID-19 Screening (If Applicable): No results found for: COVID19        Anesthesia Evaluation  Patient summary reviewed and Nursing notes reviewed no history of anesthetic complications:   Airway: Mallampati: II  TM distance: >3 FB   Neck ROM: full  Mouth opening: > = 3 FB Dental:          Pulmonary:normal exam    (+) asthma:                            Cardiovascular:    (+) hypertension:,       ECG reviewed                        Neuro/Psych:   (+) neuromuscular disease:, psychiatric history:            GI/Hepatic/Renal:   (+) morbid obesity          Endo/Other:    (+) Diabetes, . Abdominal:   (+) obese,           Vascular: negative vascular ROS. Other Findings:             Anesthesia Plan      general     ASA 4       Induction: intravenous. MIPS: Prophylactic antiemetics administered. Anesthetic plan and risks discussed with patient.                       Catina Jones MD   3/16/2022

## 2022-03-16 NOTE — PROGRESS NOTES
Pt arrived to PACU with [employer] wound vac in place settings at 125mm no leaks detected, black foam drsg and clear barrier drsg in place, pt tolerating well

## 2022-03-16 NOTE — PROGRESS NOTES
[facility] MEDICINE AND SURGERY  Progress note      Patient Name: Carlyn Chavarria  MRN: [id number]    FOLLOW UP REGARDING:  Bilateral leg wounds    ASSESSMENT:    Patient is a 62year old female that presents to the hospital with complains of left knee pain and bilateral leg wounds. Leg wounds are consistent with venous stasis ulcerations in the setting of severe edema and do not appear clinically infected at this time. PLAN AND RECOMMENDATIONS:  Exam and evaluation  Leg wounds dressed with Aquacel and overlying DSD. Plan to change dressing daily. Nursing instructions in for dressing changes. Discussed lymphedema pumps with the patient. Will dispense written information to patient. Keep BLE elevated above the level of the heart  Antibiotics per primary / ID recommendations  Podiatry will continue to follow while patient is in house    INTERVAL HPI and PERTINENT ROS:   NAEO and HDS per recorded vital signs  Seen by orthopedic surgery who did not feel the left knee infection involved the joint. To undergo I&D of the left knee with general surgery today  No constitutional symptoms       OBJECTIVE:  BP (!) 129/52   Pulse 86   Temp 99 °F (37.2 °C) (Oral)   Resp 16   Ht 5' (1.524 m)   Wt (!) 388 lb (176 kg)   SpO2 97%   BMI 75.78 kg/m²   Patient is alert and oriented x 3 in NAD.       Vascular:  Non-Palpable Dorsalis Pedis and Non-Palpable Posterior Tibial Pulses B/L due to edema  Audible biphasic DP and PT pulses bilaterally  Capillary Fill time < 3 seconds to B/L digits  Skin temperature warm to warm tibial tuberosity to the digits B/L  Hair growth absent to digits  Severe 3+ pitting edema to bilateral lower extremities with ruborous changes consistent with stasis dermatitis     Neurological:   Epicritic sensation intact B/L  Protective sensation via monofilament testing intact B/L     Musculoskeletal/Orthopaedic:   4/5 muscle strength Dorsiflexion, Plantarflexion, Inversion, Eversion B/L  Global tenderness to bilateral feet and legs     Dermatological:   Skin appears well hydrated and supple with good temperature, texture, turgor. No hyperkeratosis noted. Interspaces 1-4 are clear and without debris B/L. Nails 1-5 appear thickened and elongated bilaterally  Open lesions as noted below:     Ulceration #1:   Location: Left lateral leg  Measurements: 8 cm x 3 cm x 0.1 cm  Base: Mixed Granular/Fibrotic  Borders: Viable   Exudate: Moderate serous  Comments: No erythema extending beyond borders with no evidence of ascending lymphangitis. Wound does not undermine, does not probe to bone. Ulceration #2:   Location: Right lateral leg  Measurements: 7.5 cm x 3 cm x 0.1 cm  Base: Mixed Granular/Fibrotic  Borders: Viable   Exudate: Moderate serous  Comments: No erythema extending beyond borders with no evidence of ascending lymphangitis. Wound does not undermine, does not probe to bone. LABS:   Lab Results   Component Value Date    WBC 7.1 03/16/2022    HGB 9.1 (L) 03/16/2022    HCT 28.9 (L) 03/16/2022    MCV 78.4 (L) 03/16/2022     03/16/2022     Lab Results   Component Value Date     03/16/2022    K 4.3 03/16/2022    K 3.8 01/07/2019     03/16/2022    CO2 18 03/16/2022    BUN 25 03/16/2022    CREATININE 1.37 03/16/2022    GLUCOSE 106 03/16/2022    CALCIUM 8.9 03/16/2022      Lab Results   Component Value Date    LABALBU 2.9 (L) 03/16/2022     Lab Results   Component Value Date    SEDRATE 94 (H) 03/14/2022     Lab Results   Component Value Date    .9 (H) 03/14/2022     Lab Results   Component Value Date    LABA1C 6.4 (H) 03/15/2022     No results found for: EAG    MICROBIOLOGY:   Blood cultures x2 3/14 pending        IMAGING:   XR Tibia Fibula Left (3/14/2022)  Decrease in joint space medial compartment, lateral compartment, patellofemoral compartment. Ankle mortise intact. Posterior and plantar spurring of calcaneus. Osteopenia. No fracture. No bone lesion.       Patient's case discussed with staff and agrees with final recommendations outlined above.       Bhargav Gonzalez DPM, PGY2  Please first page [facility] On Call, [phone]  March 16, 2022  11:08 AM

## 2022-03-16 NOTE — PROGRESS NOTES
Hospitalist Progress Note      PCP: March Jade    Date of Admission: 3/14/2022    Chief Complaint:  No acute events, afebrile, stable HD, scheduled for I&D of left leg abscess today    Medications:  Reviewed    Infusion Medications    sodium chloride      lactated ringers      sodium chloride      dextrose       Scheduled Medications    sodium chloride flush  5-40 mL IntraVENous 2 times per day    miconazole   Topical BID    doxycycline  100 mg Oral 2 times per day    lactobacillus acidophilus  1 tablet Oral BID    topiramate  50 mg Oral QAM    And    topiramate  25 mg Oral Nightly    sodium chloride flush  5-40 mL IntraVENous 2 times per day    [Held by provider] enoxaparin  40 mg SubCUTAneous Daily    insulin lispro  0-6 Units SubCUTAneous TID WC    insulin lispro  0-3 Units SubCUTAneous Nightly    clindamycin (CLEOCIN) IV  900 mg IntraVENous Q6H    levothyroxine  50 mcg Oral Daily     PRN Meds: sodium chloride flush, sodium chloride, meperidine, fentanNYL, HYDROmorphone, oxyCODONE **OR** oxyCODONE, ondansetron, metoclopramide, diphenhydrAMINE, sodium chloride flush, sodium chloride, ondansetron **OR** ondansetron, polyethylene glycol, acetaminophen **OR** acetaminophen, glucose, glucagon (rDNA), dextrose, dextrose bolus (hypoglycemia) **OR** dextrose bolus (hypoglycemia), morphine      Intake/Output Summary (Last 24 hours) at 3/16/2022 1332  Last data filed at 3/16/2022 1131  Gross per 24 hour   Intake 695.17 ml   Output 1000 ml   Net -304.83 ml       Exam:    BP (!) 128/58   Pulse 81   Temp 99.1 °F (37.3 °C) (Temporal)   Resp 18   Ht 5' (1.524 m)   Wt (!) 388 lb (176 kg)   SpO2 94%   BMI 75.78 kg/m²     General appearance: alert, cooperative  Lungs: clear to auscultation bilaterally  Heart: regular rate and rhythm and S1, S2 normal  Abdomen: soft, BS active  Extremities: left LE edema and erythema with induration near the left knee area, chronic lymphedema of the LE bilaterally, right leg covered with dressing      Labs:   Recent Labs     03/14/22  1630 03/15/22  0522 03/16/22  0509   WBC 13.1* 9.1 7.1   HGB 11.1* 9.9* 9.1*   HCT 35.8* 30.8* 28.9*    209 188     Recent Labs     03/14/22  1630 03/15/22  0522 03/16/22  0508    138 139   K 4.1 4.3 4.3    107 108*   CO2 16* 20 18*   BUN 24* 26* 25*   CREATININE 1.28* 1.32* 1.37*   CALCIUM 9.0 8.6 8.9   PHOS  --  2.6 3.0     Recent Labs     03/14/22  1630   AST 16   ALT 17   BILITOT 0.9*   ALKPHOS 144*     No results for input(s): INR in the last 72 hours. Recent Labs     03/14/22  1630   CKTOTAL 24       Urinalysis:      Lab Results   Component Value Date    NITRU Negative 01/06/2019    WBCUA 3-5 01/06/2019    BACTERIA Negative 01/06/2019    RBCUA 0-2 01/06/2019    BLOODU Negative 01/06/2019    SPECGRAV 1.031 01/06/2019    GLUCOSEU Negative 01/06/2019       Radiology:  XR TIBIA FIBULA LEFT (2 VIEWS)   Final Result   DEGENERATIVE CHANGE LEFT KNEE, AND LEFT HEEL. NO FRACTURE. US DUP LOWER EXTREMITY LEFT YVETTE   Final Result   7.5 x 6.7 x 1.9 cm complex fluid collection involving subcutaneous tissues of left knee consistent with abscess or hematoma  . EXAMINATION: US SOFT TISSUE LIMITED AREA, US DUP LOWER EXTREMITY LEFT YVETTE      DATE AND TIME:3/14/2022 5:02 PM      CLINICAL HISTORY: Leg pain. Leg swelling. Thrombophlebitis, possible. Possible superficial and deep vein thrombosis, shortness of breath, dyspnea and edema   over left knee scab ? abscess             COMPARISON: None      TECHNIQUE: The lower extremity veins were evaluated with color doppler, gray scale imaging and spectral analysis while using compression and augmentation when possible. FINDINGS: Evaluation of the left lower extremity from the thigh to the knee shows normal phasic flow, normal augmentation of the Doppler signal, and normal compression of the deep veins.   There is no sonographic evidence for acute deep venous thrombosis from the left groin to the popliteal region. IMPRESSION:NEGATIVE FOR ACUTE DVT OF THE LEFT LOWER EXTREMITY FROM THE  GROIN TO THE KNEE.        US SOFT TISSUE LIMITED AREA   Final Result   7.5 x 6.7 x 1.9 cm complex fluid collection involving subcutaneous tissues of left knee consistent with abscess or hematoma  . EXAMINATION: US SOFT TISSUE LIMITED AREA, US DUP LOWER EXTREMITY LEFT YVETTE      DATE AND TIME:3/14/2022 5:02 PM      CLINICAL HISTORY: Leg pain. Leg swelling. Thrombophlebitis, possible. Possible superficial and deep vein thrombosis, shortness of breath, dyspnea and edema   over left knee scab ? abscess             COMPARISON: None      TECHNIQUE: The lower extremity veins were evaluated with color doppler, gray scale imaging and spectral analysis while using compression and augmentation when possible. FINDINGS: Evaluation of the left lower extremity from the thigh to the knee shows normal phasic flow, normal augmentation of the Doppler signal, and normal compression of the deep veins. There is no sonographic evidence for acute deep venous thrombosis    from the left groin to the popliteal region. IMPRESSION:NEGATIVE FOR ACUTE DVT OF THE LEFT LOWER EXTREMITY FROM THE  GROIN TO THE KNEE.                 Assessment/Plan:    62 y.o. female with PMH of HTN, DM2, CKD3, morbid obesity, DEEP, chronic lymphedema of the LE who presented with:     Purulent cellulitis of LLE with abscess  - failed outpatient antibiotic therapy  - doppler u/s showed a complex fluid collection involving subcutaneous tissues of left knee concerning for abscess,   - blood cultures no growth  - on IV Clindamycin and PO Doxycycline,   - s/p I&D of LLE abscess with wound VAC placement per surgical service  - followed by ID      DM2 with hyperglycemia  - ISS, hypoglycemia protocol     CKD3  - monitor renal function    Microcytic anemia  - follow H/H    DEEP  - continue CPAP at HS Disposition - home with Zeinab Schmidt      Electronically signed by Chantale Nielson MD on 3/16/2022 at 1:32 PM

## 2022-03-16 NOTE — CONSULTS
PODIATRIC MEDICINE AND SURGERY  CONSULT HISTORY AND PHYSICAL      Consulting Service: Medicine  Requesting Provider: Dr. Warren Goldstein regarding: Bilateral leg wounds  Staff Doctor:  Dr. Atiya Smith:  Patient is a 62year old female that presents to the hospital with complains of left knee pain and bilateral leg wounds. Leg wounds are consistent with venous stasis ulcerations in the setting of severe edema and do not appear clinically infected at this time. Plan:  Exam and evaluation  Leg wounds dressed with Aquacel and overlying DSD. Plan to change dressing daily. Nursing instructions in for dressing changes. Discussed lymphedema pumps with the patient. Will dispense written information to patient. Keep BLE elevated above the level of the heart  Antibiotics per primary / ID recommendations  Podiatry will continue to follow while patient is in house      HPI: This very pleasant 62y.o. year old female with PMH as seen below seen today for bilateral leg wounds. States right leg wound started this past November and her left leg wound started in January following a fall. Is seen by [facility] for wound care which has consisted of xeroform to the wounds. Presented to the ED due to concern for left knee pain and swelling which was suspected to be due to an abscess. Patient was found to have a leukocytosis of 13.1 as well as CRP and ESR of 254.9 and 94 respectively. States that she is pre-diabetic and has a most recent A1c of 6.4% (3/15/2022). Patient denies any fevers, chills, nausea, vomiting, chest pain or shortness of breath.        Past Medical History:   Diagnosis Date    [facility]ipolar depression ([facility]     Depression     Diabetes mellitus ([facility]     Hypertension     PTSD (post-traumatic stress disorder)     Thyroid disease        Past Surgical History:   Procedure Laterality Date    BREAST LUMPECTOMY Right     CERVICAL LAMINECTOMY      C2-C6    INCISION AND DRAINAGE Left 1/7/2019    INCISION AND DRAINAGE LEFT PERIRECTAL  ABSCESS performed by Wil Porras MD at [facility]         No current facility-administered medications on file prior to encounter.      Current Outpatient Medications on File Prior to Encounter   Medication Sig Dispense Refill    TRULICITY 3 SY/8.6LD SOPN Inject 3 mg into the skin once a week Every Monday      lisinopril (PRINIVIL;ZESTRIL) 10 MG tablet Take 10 mg by mouth in the morning and at bedtime      topiramate (TOPAMAX) 25 MG tablet Take 25 mg by mouth in the morning and at bedtime 2 tabs am 1 tab HS      Cholecalciferol (VITAMIN D) 2000 units CAPS capsule Take by mouth      levothyroxine (SYNTHROID) 25 MCG tablet Take 50 mcg by mouth Daily          Allergies   Allergen Reactions    Bactrim [Sulfamethoxazole-Trimethoprim] Rash    Penicillins Swelling    Prednisone Other (See Comments)     States made her angry and manic    Vancomycin Other (See Comments)     Put her in renal failure per pt       Family History   Problem Relation Age of Onset    Bipolar Disorder Mother     Bipolar Disorder Sister     Bipolar Disorder Brother        Social History     Socioeconomic History    Marital status:      Spouse name: Not on file    Number of children: Not on file    Years of education: Not on file    Highest education level: Not on file   Occupational History    Not on file   Tobacco Use    Smoking status: Never Smoker    Smokeless tobacco: Never Used   Substance and Sexual Activity    Alcohol use: Yes     Comment: occassionally    Drug use: No    Sexual activity: Not on file   Other Topics Concern    Not on file   Social History Narrative    Not on file     Social Determinants of Health     Financial Resource Strain:     Difficulty of Paying Living Expenses: Not on file   Food Insecurity:     Worried About Running Out of Food in the Last Year: Not on file    Savage of Food in the Last Year: Not on file   Transportation Needs:     Lack of Transportation (Medical): Not on file    Lack of Transportation (Non-Medical): Not on file   Physical Activity:     Days of Exercise per Week: Not on file    Minutes of Exercise per Session: Not on file   Stress:     Feeling of Stress : Not on file   Social Connections:     Frequency of Communication with Friends and Family: Not on file    Frequency of Social Gatherings with Friends and Family: Not on file    Attends Congregation Services: Not on file    Active Member of [address] [place] [address] [employer] or Organizations: Not on file    Attends Club or Organization Meetings: Not on file    Marital Status: Not on file   Intimate Partner Violence:     Fear of Current or Ex-Partner: Not on file    Emotionally Abused: Not on file    Physically Abused: Not on file    Sexually Abused: Not on file   Housing Stability:     Unable to Pay for Housing in the Last Year: Not on file    Number of [place] in the Last Year: Not on file    Unstable Housing in the Last Year: Not on file         REVIEW OF SYSTEMS:  See HPI      OBJECTIVE:  BP (!) 129/52   Pulse 86   Temp 99 °F (37.2 °C)   Resp 18   Ht 5' (1.524 m)   Wt (!) 388 lb (176 kg)   SpO2 97%   BMI 75.78 kg/m²   Patient is alert and oriented x 3 in NAD.      Vascular:  Non-Palpable Dorsalis Pedis and Non-Palpable Posterior Tibial Pulses B/L due to edema  Audible biphasic DP and PT pulses bilaterally  Capillary Fill time < 3 seconds to B/L digits  Skin temperature warm to warm tibial tuberosity to the digits B/L  Hair growth absent to digits  Severe 3+ pitting edema to bilateral lower extremities with ruborous changes consistent with stasis dermatitis    Neurological:   Epicritic sensation intact B/L  Protective sensation via monofilament testing intact B/L    Musculoskeletal/Orthopaedic:   4/5 muscle strength Dorsiflexion, Plantarflexion, Inversion, Eversion B/L  Global tenderness to bilateral feet and legs    Dermatological:   Skin appears well hydrated and supple with good temperature, texture, turgor. No hyperkeratosis noted. Interspaces 1-4 are clear and without debris B/L. Nails 1-5 appear thickened and elongated bilaterally  Open lesions as noted below:    Ulceration #1:   Location: Left lateral leg  Measurements: 8 cm x 3 cm x 0.1 cm  Base: Mixed Granular/Fibrotic  Borders: Viable   Exudate: Moderate serous  Comments: No erythema extending beyond borders with no evidence of ascending lymphangitis. Wound does not undermine, does not probe to bone. Ulceration #2:   Location: Right lateral leg  Measurements: 7.5 cm x 3 cm x 0.1 cm  Base: Mixed Granular/Fibrotic  Borders: Viable   Exudate: Moderate serous  Comments: No erythema extending beyond borders with no evidence of ascending lymphangitis. Wound does not undermine, does not probe to bone. LABS:   Lab Results   Component Value Date    WBC 9.1 03/15/2022    HGB 9.9 (L) 03/15/2022    HCT 30.8 (L) 03/15/2022    MCV 77.7 (L) 03/15/2022     03/15/2022     Lab Results   Component Value Date     03/15/2022    K 4.3 03/15/2022    K 3.8 01/07/2019     03/15/2022    CO2 20 03/15/2022    BUN 26 03/15/2022    CREATININE 1.32 03/15/2022    GLUCOSE 107 03/15/2022    CALCIUM 8.6 03/15/2022      Lab Results   Component Value Date    LABALBU 3.1 (L) 03/15/2022     Lab Results   Component Value Date    SEDRATE 94 (H) 03/14/2022     Lab Results   Component Value Date    .9 (H) 03/14/2022     Lab Results   Component Value Date    LABA1C 6.4 (H) 03/15/2022     No results found for: EAG    MICROBIOLOGY:   Blood cultures x2 3/14 pending      IMAGING:   XR Tibia Fibula Left (3/14/2022)  Decrease in joint space medial compartment, lateral compartment, patellofemoral compartment. Ankle mortise intact. Posterior and plantar spurring of calcaneus. Osteopenia. No fracture. No bone lesion.       Patient's case will be discussed with staff, who will provide final recommendations. Thank you for the consult.     Denys Patrick DPM, PGY2  Please first page Podiatry On Call, [phone]  March 15, 2022  8:04 PM

## 2022-03-16 NOTE — PROGRESS NOTES
Pharmacist Review and Automatic Dose Adjustment of Prophylactic Enoxaparin      The reviewing pharmacist has made an adjustment to the ordered enoxaparin dose or converted to UFH per the approved [facility] protocol and table as identified below. Brendan Zheng is a 62 y.o. female. Recent Labs     03/14/22  1630 03/15/22  0522 03/16/22  0508   CREATININE 1.28* 1.32* 1.37*       Estimated Creatinine Clearance: 67 mL/min (A) (based on SCr of 1.37 mg/dL (H)).     Height:   Ht Readings from Last 1 Encounters:   03/14/22 5' (1.524 m)     Weight:  Wt Readings from Last 1 Encounters:   03/14/22 (!) 388 lb (176 kg)               Plan: Based upon the patient's weight and renal function, the enoxaparin dose has been changed/converted to 60mg BID      Thank you,  Michael Sadler, [facility]  3/16/2022, 4:03 PM

## 2022-03-16 NOTE — BRIEF OP NOTE
Brief Postoperative Note      Patient: Odell Montgomery  YOB: 1960  MRN: [id number]    Date of Procedure: 3/16/2022    Pre-Op Diagnosis: LEFT LEG ABSCESS    Post-Op Diagnosis: Same       Procedure(s):  INCISION AND DRAINAGE OF LEFT LEG ABSCESS WITH WOUND VAC PLACEMENT    Surgeon(s):  Wale Cary MD    Assistant:  First Assistant: Scotty Keith    Anesthesia: Choice    Estimated Blood Loss (mL): 30    Complications: None    Specimens:   ID Type Source Tests Collected by Time Destination   1 : Left leg abcess Body Fluid Leg CULTURE, ANAEROBIC AND AEROBIC Wale Cary MD 3/16/2022 1331        Implants:  * No implants in log *      Drains: * No LDAs found *    Findings: 10 x 8 cm subcutaneous left leg abscess    Electronically signed by Melody Kayser, MD on 3/16/2022 at 1:44 PM

## 2022-03-16 NOTE — FLOWSHEET NOTE
Assessment completed. VS WNL. +2 BLE edema, drsg clean, dry,intact. PRN Morphine given for 8/10 pain. Abd fold red, excoriated. Pt denies PM Vibramycin-she request to speak with ID regarding need for additional ATB and hx of kidney failure. Pt NPO for am procedure. Denies further needs at the moment. Standard safety precaution in place. Will continue to monitor.

## 2022-03-16 NOTE — PROGRESS NOTES
Shift assessment complete. Patient A&Ox4. VSS. She denies pain at this time. Medication given per [facility]. With the exception of doxycycline, patient refused. Redness noted bilateral groin area, abdominal fold, and under breast micotin powder applied. PT/OT at bedside patient is tearful stating her legs don't work like normal people's and that she doesn't feel good. Patient reassured by PT/OT and participated in therapy. 1550: Patient returned from surgery. VSS. She denies pain. Wound VAC in place. Patient states she can't go home with this VAC on her leg. I explained that she has the option to go to a SNF of home with home care. She would like to discuss tomorrow with care coordination. Patient refused doxycycline because she was worried that it would cause kidney problems. I checked lexicomp and discussed medication with pharmacist and it was found not to be a nephrotoxic drug. Findings discussed with patient. All questions answered. Patient agrees to take next dose.

## 2022-03-16 NOTE — PROGRESS NOTES
Wound Ostomy Continence Nurse  Consult Note       NAME:  [address] [place] [address] Miguel Angel RECORD NUMBER:  [id number]  AGE: 62 y.o. GENDER: female  : 1960  TODAY'S DATE:  3/16/2022    Subjective   Reason for [id number] Nurse Evaluation and Assessment: Wound VAC application Left Lateral Leg, S/p I&D       Marinda Felty is a 62 y.o. female referred by:   [x] Physician  [] Nursing  [] Other:     Wound Identification:  Wound Type: surgical s/p I&D  Contributing Factors: obesity and abscess    Wound History: Patient just had an I&D in OR, performed by Dr. Domingo Tomas. This [address] Nurse applied NPWT, Wound VAC at this time. Current Wound Care Treatment: Application of NPWT, Wound VAC to left lateral leg. Dressing changes MWF, 125 mmHg of low, continuous negative pressure.     Patient Goal of Care:  [x] Wound Healing  [] Odor Control  [] Palliative Care  [] Pain Control   [] Other:         PAST MEDICAL HISTORY        Diagnosis Date    Bipolar depression ([facility]     Depression     Diabetes mellitus ([facility]     Hypertension     PTSD (post-traumatic stress disorder)     Thyroid disease        PAST SURGICAL HISTORY    Past Surgical History:   Procedure Laterality Date    BREAST LUMPECTOMY Right     CERVICAL LAMINECTOMY      C2-C6    INCISION AND DRAINAGE Left 2019    INCISION AND DRAINAGE LEFT PERIRECTAL  ABSCESS performed by Cedric Reynoso MD at [facility] ROTATOR CUFF REPAIR         FAMILY HISTORY    Family History   Problem Relation Age of Onset    Bipolar Disorder Mother     Bipolar Disorder Sister     Bipolar Disorder Brother        SOCIAL HISTORY    Social History     Tobacco Use    Smoking status: Never Smoker    Smokeless tobacco: Never Used   Substance Use Topics    Alcohol use: Yes     Comment: occassionally    Drug use: No       ALLERGIES    Allergies   Allergen Reactions    Bactrim [Sulfamethoxazole-Trimethoprim] Rash    Penicillins Swelling    Prednisone Other (See Comments) States made her angry and manic    Vancomycin Other (See Comments)     Put her in renal failure per pt       MEDICATIONS    No current facility-administered medications on file prior to encounter.      Current Outpatient Medications on File Prior to Encounter   Medication Sig Dispense Refill    TRULICITY 3 NW/4.4GW SOPN Inject 3 mg into the skin once a week Every Monday      lisinopril (PRINIVIL;ZESTRIL) 10 MG tablet Take 10 mg by mouth in the morning and at bedtime      topiramate (TOPAMAX) 25 MG tablet Take 25 mg by mouth in the morning and at bedtime 2 tabs am 1 tab HS      Cholecalciferol (VITAMIN D) 2000 units CAPS capsule Take by mouth      levothyroxine (SYNTHROID) 25 MCG tablet Take 50 mcg by mouth Daily          Objective    BP (!) 128/58   Pulse 81   Temp 99.1 °F (37.3 °C) (Temporal)   Resp 18   Ht 5' (1.524 m)   Wt (!) 388 lb (176 kg)   SpO2 94%   BMI 75.78 kg/m²     LABS:  WBC:    Lab Results   Component Value Date    WBC 7.1 03/16/2022     H/H:    Lab Results   Component Value Date    HGB 9.1 03/16/2022    HCT 28.9 03/16/2022     PTT:    Lab Results   Component Value Date    APTT 34.3 01/06/2019   [APTT}  PT/INR:    Lab Results   Component Value Date    PROTIME 12.1 01/06/2019    INR 1.2 01/06/2019     HgBA1c:    Lab Results   Component Value Date    LABA1C 6.4 03/15/2022       Assessment   Duarte Risk Score: Duarte Scale Score: 17    Patient Active Problem List   Diagnosis    Asthma, chronic    Cervical myelopathy (HCC)    Cervical vertebral fusion    Diabetes mellitus due to underlying condition with diabetic polyneuropathy (HCC)    Diabetes mellitus type II, controlled ([facility]    HTN (hypertension)    Hyperlipidemia    Metabolic syndrome    Stasis dermatitis    Sepsis (HCC)    Perirectal abscess    Bipolar 1 disorder, depressed, severe (HCC)    Anxiety disorder, unspecified    Chronic post-traumatic stress disorder (PTSD)    Abscess    Cellulitis and abscess of left lower extremity    Lymphedema due to venous disease    Venous stasis ulcer of right calf limited to breakdown of skin without varicose veins (HCC)       Measurements:  Negative Pressure Wound Therapy Knee Anterior; Left (Active)   $ Standard NPWT <=50 sq cm PER TX $ Yes 03/16/22 1355   Wound Type Surgical 03/16/22 1355   Unit Type KCI 03/16/22 1355   Dressing Type Black foam 03/16/22 1355   Number of pieces used 1 03/16/22 1355   Cycle Continuous 03/16/22 1355   Target Pressure (mmHg) 125 03/16/22 1355   Canister changed? Yes 03/16/22 1355   Dressing Status Changed 03/16/22 1355   Dressing Changed Changed/New 03/16/22 1355   Drainage Amount Small 03/16/22 1355   Drainage Description Sanguinous 03/16/22 1355   Dressing Change Due 03/18/22 03/16/22 1355   Wound Assessment Red;Granulation tissue 03/16/22 1355   Odor None 03/16/22 1355   Number of days: 0       Wound 03/14/22 Pelvis Left open area skin fold: Moisture Associated Skin Damage (MASD) (Active)   Wound Etiology Other 03/15/22 1005   Dressing Status Other (Comment) 03/16/22 1133   Dressing/Treatment Pharmaceutical agent (see MAR) 03/16/22 1133   Wound Assessment Pink/red 03/16/22 1133   Drainage Amount None 03/16/22 1133   Odor None 03/16/22 1133   Number of days: 1       Wound 03/14/22 Leg Right;Distal;Lateral (Active)   Wound Etiology Venous 03/16/22 1133   Dressing Status Clean; Intact;Dry 03/16/22 1133   Dressing/Treatment Dry dressing 03/16/22 1133   Wound Length (cm) 14 cm 03/15/22 1005   Wound Width (cm) 4 cm 03/15/22 1005   Wound Depth (cm) 0.1 cm 03/15/22 1005   Wound Surface Area (cm^2) 56 cm^2 03/15/22 1005   Wound Volume (cm^3) 5.6 cm^3 03/15/22 1005   Wound Assessment Pink/red 03/15/22 1005   Drainage Amount Small 03/16/22 1133   Drainage Description Serous 03/15/22 1005   Odor None 03/15/22 2016   Lina-wound Assessment Maceration 03/15/22 1005   Margins Undefined edges 03/15/22 1005   Wound Thickness Description not for Pressure Injury Full thickness 03/15/22 1005   Number of days: 2       Wound 03/14/22 Leg Left;Distal;Lateral (Active)   Wound Etiology Venous 03/16/22 0843   Dressing Status Intact;Dry;Clean 03/16/22 0843   Dressing/Treatment Dry dressing 03/16/22 0843   Wound Length (cm) 9 cm 03/15/22 1005   Wound Width (cm) 3 cm 03/15/22 1005   Wound Depth (cm) 0.1 cm 03/15/22 1005   Wound Surface Area (cm^2) 27 cm^2 03/15/22 1005   Wound Volume (cm^3) 2.7 cm^3 03/15/22 1005   Wound Assessment Pink/red 03/15/22 1005   Drainage Amount Small 03/16/22 0843   Drainage Description Serous 03/16/22 0843   Odor None 03/16/22 0843   Lina-wound Assessment Maceration 03/15/22 1005   Margins Undefined edges 03/15/22 1005   Wound Thickness Description not for Pressure Injury Full thickness 03/15/22 1005   Number of days: 2     Incision 01/07/19 Perineum Left (Active)   Number of days: 2782       Incision 03/16/22 Knee Anterior; Left (Active)   Dressing Status New dressing applied 03/16/22 1355   Dressing Change Due 03/18/22 03/16/22 1355   Dressing/Treatment Negative pressure wound therapy 03/16/22 1355   Incision Length (cm) 1.5 03/16/22 1355   Incision Width (cm) 11 cm 03/16/22 1355   Incision Depth (cm) 2.5 cm 03/16/22 1355   Closure Other (Comment) 03/16/22 1355   Drainage Amount Small 03/16/22 1355   Drainage Description Sanguinous 03/16/22 1355   Odor None 03/16/22 1355   Lina-incision Assessment Edematous 03/16/22 1355   Number of days: 0     Assessment:    Evaluated wound with Dr. Do Ortiz in [address] [place] [address], s/p I&D of left lateral leg abscess. Measurements obtained - 1.5x11x2.5 cm with undermining from approximately 10 o'clock to 8 o'clock for 5cm. Wound bed is clean, red, and granular. Small amount of bleeding noted. Lina wound skin is intact and edematous (patient with history of lymphedema. Wound picture-framed with clear transparent film. One piece of black foam lightly packed into the wound bed, assuring all areas of undermining were filled.  Foam secured in place with clear transparent film and hole cut to accommodate the suction tubing. Wound VAC turned on, standard settings. No seal leaks noted.     Plan   Plan of Care: Wound 03/14/22 Pelvis Left open area skin fold: Moisture Associated Skin Damage (MASD)-Dressing/Treatment: Pharmaceutical agent (see MAR)  Wound 03/14/22 Leg Right;Distal;Lateral-Dressing/Treatment: Dry dressing  Wound 03/14/22 Leg Left;Distal;Lateral-Dressing/Treatment: Dry dressing    Specialty Bed Required : N/A   [] Low Air Loss   [] Pressure Redistribution  [] Fluid Immersion  [] Bariatric  [] Other:     Current Diet: Diet NPO  Dietician consult:  N/A    Discharge Plan:  Placement for patient upon discharge: home with support    Patient appropriate for Outpatient [address]: Yes    Referrals:  []   [] [facility]  [] Supplies  [] Other    Patient/Caregiver Teaching:  Level of patient/caregiver understanding able to:   [] Indicates understanding       [] Needs reinforcement  [] Unsuccessful      [] Verbal Understanding  [] Demonstrated understanding       [] No evidence of learning  [] Refused teaching         [x] N/A       Electronically signed by SHELLIE Esqueda, RN, Dereck Stapleton on 3/16/2022 at 2:14 PM

## 2022-03-16 NOTE — PROGRESS NOTES
Physician Progress Note      PATIENT:               Mercedes Baer  CSN #:                  [id number]  :                       1960  ADMIT DATE:       3/14/2022 4:13 PM  [address] DATE:  RESPONDING  PROVIDER #:        Osbaldo Funk MD          QUERY TEXT:    Patient admitted with Purulent cellulitis of LLE with abscess. Patient noted   to have WBC 13.1 .9  pulse 105 procalcitonin 2.49  2.08. If possible,   please document in the progress notes and discharge summary if you are   evaluating and /or treating any of the following: The medical record reflects the following:  Risk Factors: 62 yof  cellulitis w/ abscess of LLE  Clinical Indicators: WBC 13.1;  .9  pulse 105 procalcitonin 2.49  2.08  Treatment: IV clindamycin  ID ortho consult pertinent labs  US LLE  xray tibia    plan incision and drainage w/ wound vac placement    Thank you,  Kathie Brice BSN RN CDS   6am-2pm  Options provided:  -- Sepsis, present on admission  -- cellulitis without Sepsis  -- Other - I will add my own diagnosis  -- Disagree - Not applicable / Not valid  -- Disagree - Clinically unable to determine / Unknown  -- Refer to Clinical Documentation Reviewer    PROVIDER RESPONSE TEXT:    This patient has cellulitis without Sepsis.     Query created by: Emile Constantino on 3/16/2022 8:00 AM      Electronically signed by:  Osbaldo Funk MD 3/16/2022 4:54 PM

## 2022-03-16 NOTE — CARE COORDINATION
THIS LSW MET WITH PATIENT AT BEDSIDE THIS AM. PATIENT IS RESTING COMFORTABLY. PATIENT AND I DISCUSSED D/C PLANS. D/C PLAN: HOME WITH OWN C-PAP MACHINE AND RESUMPTION OF [facility]. LSW AND CM TO FOLLOW.   Electronically signed by ELZA Bain, ERYN on 3/16/22 at 11:22 AM EDT

## 2022-03-16 NOTE — PROGRESS NOTES
[facility] OCCUPATIONAL THERAPY EVALUATION - ACUTE     NAME: Armani Lund  : 1960 (62 y.o.)  MRN: [id number]  CODE STATUS: Full Code  Room: [facility] [place] [facility] [id number]    Date of Service: 3/16/2022    Patient Diagnosis(es): Abscess [L02.91]  Septicemia ([address] [A41.9]  Cellulitis and abscess of left lower extremity [L03.116, L02.416]   Chief Complaint   Patient presents with    Leg Pain     Patient Active Problem List    Diagnosis Date Noted    Lymphedema due to venous disease 03/15/2022    Venous stasis ulcer of right calf limited to breakdown of skin without varicose veins ([address] 03/15/2022    Cellulitis and abscess of left lower extremity     Abscess 2022    Anxiety disorder, unspecified 2019    Chronic post-traumatic stress disorder (PTSD) 2019    Bipolar 1 disorder, depressed, severe ([address] 2019    Perirectal abscess     Asthma, chronic 2019    HTN (hypertension) 2019    Hyperlipidemia 2019    Stasis dermatitis 2019    Sepsis ([address] 2019    Cervical myelopathy ([address] 2017    Diabetes mellitus due to underlying condition with diabetic polyneuropathy ([address] 2017    Cervical vertebral fusion 2017    Diabetes mellitus type II, controlled ([address]     Metabolic syndrome         Past Medical History:   Diagnosis Date    Bipolar depression ([address]     Depression     Diabetes mellitus ([address]     Hypertension     PTSD (post-traumatic stress disorder)     Thyroid disease      Past Surgical History:   Procedure Laterality Date    BREAST LUMPECTOMY Right     CERVICAL LAMINECTOMY      C2-C6    INCISION AND DRAINAGE Left 2019    INCISION AND DRAINAGE LEFT PERIRECTAL  ABSCESS performed by Landon Valdovinos MD at [address] [place] [address]          Restrictions        Safety Devices: Safety Devices  Safety Devices in place: Yes  Type of devices:  All fall risk precautions in place,Bed alarm in place,Call light within reach,Left in bed        Subjective  Pre Treatment Pain Screening  Pain at present: 2  Scale Used: Numeric Score  Intervention List: Patient able to continue with treatment    Pain Reassessment:   Pain Assessment  Patient Currently in Pain: Yes  Pain Assessment: 0-10  Pain Level: 2  Pain Type: Acute pain  Pain Location: Leg  Pain Orientation: Left       Prior Level of Function:  Social/Functional History  Lives With: Alone  Type of Home: House  Home Layout: Two level,Able to Live on Main level with bedroom/bathroom,Performs ADL's on one level  Home Access: Stairs to enter with rails  Entrance Stairs - Number of Steps: 6 in the front, 5 in the back  Entrance Stairs - Rails: Both  Bathroom Shower/Tub: Walk-in shower  Bathroom Toilet: Standard  Bathroom Equipment: Hand-held shower  Home Equipment: Rolling walker  ADL Assistance: Independent (Assist PRN with ADLs, however pt states that she has very little help currently)  Homemaking Assistance: Independent  Homemaking Responsibilities: Yes  Ambulation Assistance: Independent (with [employer])  Transfer Assistance: Independent  Additional Comments: Pt admits to multiple falls recently and has been struggling to manage her household and ADLs over the past few months. OBJECTIVE:     Orientation Status:  Orientation  Overall Orientation Status: Within Functional Limits    Observation:  Observation/Palpation  Posture: Fair  Observation: No acute distress. Pt appeared anxious and upset about mobility however motivated to get OOB. B calves dressed in gauze wrap. Erythema B LEs.     Cognition Status:  Cognition  Overall Cognitive Status: WFL    Perception Status:  Perception  Overall Perceptual Status: WFL    Sensation Status:  Sensation  Overall Sensation Status:  (Pt c/o B LE and UE numbness)  Light Touch: Severe deficits in the LLE,Severe deficits in the LUE,Severe deficits in the RUE,Severe deficits in the RLE  Sharp/Dull: Not tested  Stereognosis: Not tested  Proprioception: Not tested  Additional Comments: Pt reported numbness and tingling in B LE, and B UE    Vision and Hearing Status:  Vision  Vision: Within Functional Limits  Hearing  Hearing: Within functional limits     ROM:   LUE AROM (degrees)  LUE AROM : WFL  Left Hand AROM (degrees)  Left Hand AROM: WFL  RUE AROM (degrees)  RUE AROM : WFL  Right Hand AROM (degrees)  Right Hand AROM: WFL    Strength:  LUE Strength  Gross LUE Strength: Exceptions to [facility]  L Hand General: 3/5  RUE Strength  Gross RUE Strength: Exceptions to [facility]  R Hand General: 3/5    Coordination, Tone, Quality of Movement: Tone RUE  RUE Tone: Normotonic  Tone LUE  LUE Tone: Normotonic  Coordination  Movements Are Fluid And Coordinated: No  Coordination and Movement description: Decreased speed,Decreased accuracy,Right UE,Left UE,Fine motor impairments  Quality of Movement Other  Comment: Pt reported having difficulties opening and closing containers    Hand Dominance:       ADL Status:  ADL  Feeding: Setup,Increased time to complete  Grooming: Setup  UE Bathing: Moderate assistance,Increased time to complete  LE Bathing: Dependent/Total  UE Dressing: Minimal assistance,Increased time to complete  LE Dressing: Dependent/Total  Toileting: Maximum assistance  Additional Comments: Simulated ADL levels. However, patient completed toothbrushing and face washing with set up. Pt completed wiping on toilet, however RN provided cream and wiping to patients buttocks. Toilet Transfers  Toilet - Technique: Stand step,Ambulating (use of RR)  Equipment Used: Standard toilet (use of grab bars)  Toilet Transfer: Contact guard assistance  Toilet Transfers Comments: Pt was noted to leave RR outside of door and reach for the grab bar. Therapist instructed pt not to reach that far out of PAULA.  Pt was able to utilize grab bars to turn body to sit on toilet       Therapy key for assistance levels -   Independent = Pt. is able to perform task with no assistance but may require a device   Stand by assistance = Pt. does not perform task at an independent level but does not need physical assistance, requires verbal cues  Minimal, Moderate, Maximal Assistance = Pt. requires physical assistance (25%, 50%, 75% assist from helper) for task but is able to actively participate in task   Dependent = Pt. requires total assistance with task and is not able to actively participate with task completion     Functional Mobility:  Functional Mobility  Functional - Mobility Device: Rolling Walker  Activity: To/from bathroom  Assist Level: Contact guard assistance  Transfers  Stand Step Transfers: Contact guard assistance  Sit to stand: Contact guard assistance  Stand to sit: Contact guard assistance    Bed Mobility  Bed mobility  Rolling to Left: Maximum assistance,2 Person assistance  Rolling to Right: Maximum assistance,2 Person assistance  Supine to Sit: Maximum assistance,2 Person assistance  Sit to Supine: Maximum assistance,2 Person assistance  Scooting: Maximal assistance  Comment: Step by step cues for sequencing all transitions. Pt requires max assist to mobilize LEs off/on bed and support for trunk. Pt was unable to follow commands of use of bed rails. Seated and Standing Balance:  Balance  Sitting Balance: Supervision  Standing Balance: Stand by assistance    Functional Endurance:  Activity Tolerance  Activity Tolerance: Patient Tolerated treatment well    D/C Recommendations:  OT D/C RECOMMENDATIONS  REQUIRES OT FOLLOW UP: Yes    Equipment Recommendations:  OT Equipment Recommendations  Equipment Needed: Yes  Other: potential shower chair    OT Education:   OT Education  OT Education: OT Role,Plan of Care    OT Follow Up:  OT D/C RECOMMENDATIONS  REQUIRES OT FOLLOW UP: Yes       Assessment/Discharge Disposition:  Assessment: Pt is a 62year old female who presents to [facility] ([facility]) for abscess in B LE.  Pt was friendly, motivated and emotional during evaluation d/t everything she is going through. Pt expressed that she has been tripping and falling at home. 2017 she had a major neck surgery, october of 2020 she fell and reported her disease in back. Pt presents with decreased B UE ROM, strength and mobility. Performance deficits / Impairments: Decreased strength,Decreased sensation,Decreased fine motor control,Decreased high-level IADLs,Decreased endurance,Decreased ADL status,Decreased coordination,Decreased functional mobility   Prognosis: Good  Discharge Recommendations: Continue to assess pending progress  Decision Making: High Complexity  History: Moderate complexity  Exam: 9 deficits  Assistance / Modification: CGA    Six Click Score   How much help for putting on and taking off regular lower body clothing?: Total  How much help for Bathing?: A Lot  How much help for Toileting?: A Lot  How much help for putting on and taking off regular upper body clothing?: A Little  How much help for taking care of personal grooming?: A Little  How much help for eating meals?: A Little  AM-[facility] Inpatient Daily Activity Raw Score: 14  AM-PAC Inpatient ADL T-Scale Score : 33.39  ADL Inpatient CMS 0-100% Score: 59.67    Plan:  Plan  Times per week: 1-4x  Plan weeks: LOS  Current Treatment Recommendations: Strengthening,Endurance Training,Self-Care / ADL,Functional Mobility Training,Safety Education & Training,Balance Training    Goals:   Patient will:    - Improve functional endurance to tolerate/complete 20 mins of ADL's  - Be Mod I in UB ADLs   - Be Min A in LB ADLs  - Be independent in toileting tasks  - Improve B hand fine motor coordination to 5/5 in order to manage clothing fasteners/self-care containers in a timely manner  - Improve B UE strength and endurance to 5/5 in order to participate in self-care activities as projected. Patient Goal: Patient goals : \"I just want to be able to get up. \"     Discussed and agreed upon: Yes Comments:     Therapy Time:   OT Individual Minutes  Time In: 9758  Time Out: 0966  Minutes: 41    Eval: 41 minutes     Electronically signed by:    Aliyah Cardenas OT, OTR/L  3/16/2022, 9:53 AM

## 2022-03-16 NOTE — ANESTHESIA POSTPROCEDURE EVALUATION
Department of Anesthesiology  Postprocedure Note    Patient: Nilad Parker  MRN: [id number]  YOB: 1960  Date of evaluation: 3/16/2022  Time:  1:58 PM     Procedure Summary     Date: 03/16/22 Room / Location: [address]    Anesthesia Start: 0746 Anesthesia Stop:     Procedure: INCISION AND DRAINAGE OF LEFT LEG ABSCESS WITH WOUND VAC PLACEMENT (Left Leg Upper) Diagnosis: (LEFT LEG ABSCESS)    Surgeons: Dante Baumann MD Responsible Provider: Jerri Key MD    Anesthesia Type: general ASA Status: 4          Anesthesia Type: No value filed. Donaldo Phase I:      Donaldo Phase II:      Last vitals: Reviewed and per EMR flowsheets.        Anesthesia Post Evaluation    Patient location during evaluation: PACU  Patient participation: complete - patient participated  Level of consciousness: awake  Pain score: 0  Airway patency: patent  Nausea & Vomiting: no nausea  Complications: no  Cardiovascular status: hemodynamically stable  Respiratory status: acceptable  Hydration status: euvolemic

## 2022-03-17 LAB
ALBUMIN SERPL-MCNC: 3 G/DL (ref 3.5–4.6)
ANION GAP SERPL CALCULATED.3IONS-SCNC: 14 MEQ/L (ref 9–15)
BASOPHILS ABSOLUTE: 0 K/UL (ref 0–0.2)
BASOPHILS RELATIVE PERCENT: 0.4 %
BUN BLDV-MCNC: 25 MG/DL (ref 8–23)
C-REACTIVE PROTEIN, HIGH SENSITIVITY: 201.2 MG/L (ref 0–5)
CALCIUM SERPL-MCNC: 8.9 MG/DL (ref 8.5–9.9)
CHLORIDE BLD-SCNC: 105 MEQ/L (ref 95–107)
CO2: 19 MEQ/L (ref 20–31)
CREAT SERPL-MCNC: 1.22 MG/DL (ref 0.5–0.9)
EOSINOPHILS ABSOLUTE: 0.2 K/UL (ref 0–0.7)
EOSINOPHILS RELATIVE PERCENT: 3.1 %
GFR AFRICAN AMERICAN: 54.4
GFR NON-AFRICAN AMERICAN: 44.9
GLUCOSE BLD-MCNC: 106 MG/DL (ref 70–99)
GLUCOSE BLD-MCNC: 110 MG/DL (ref 70–99)
GLUCOSE BLD-MCNC: 139 MG/DL (ref 70–99)
GLUCOSE BLD-MCNC: 144 MG/DL (ref 70–99)
GLUCOSE BLD-MCNC: 98 MG/DL (ref 70–99)
HCT VFR BLD CALC: 29.9 % (ref 37–47)
HEMOGLOBIN: 9.4 G/DL (ref 12–16)
LYMPHOCYTES ABSOLUTE: 1.2 K/UL (ref 1–4.8)
LYMPHOCYTES RELATIVE PERCENT: 17.4 %
MAGNESIUM: 2.1 MG/DL (ref 1.7–2.4)
MCH RBC QN AUTO: 24.6 PG (ref 27–31.3)
MCHC RBC AUTO-ENTMCNC: 31.6 % (ref 33–37)
MCV RBC AUTO: 77.9 FL (ref 82–100)
MONOCYTES ABSOLUTE: 0.6 K/UL (ref 0.2–0.8)
MONOCYTES RELATIVE PERCENT: 8.9 %
NEUTROPHILS ABSOLUTE: 5 K/UL (ref 1.4–6.5)
NEUTROPHILS RELATIVE PERCENT: 70.2 %
PDW BLD-RTO: 17.9 % (ref 11.5–14.5)
PERFORMED ON: ABNORMAL
PHOSPHORUS: 4 MG/DL (ref 2.3–4.8)
PLATELET # BLD: 220 K/UL (ref 130–400)
POTASSIUM SERPL-SCNC: 4.5 MEQ/L (ref 3.4–4.9)
PROCALCITONIN: 0.81 NG/ML (ref 0–0.15)
RBC # BLD: 3.83 M/UL (ref 4.2–5.4)
SODIUM BLD-SCNC: 138 MEQ/L (ref 135–144)
WBC # BLD: 7.1 K/UL (ref 4.8–10.8)

## 2022-03-17 PROCEDURE — 2500000003 HC RX 250 WO HCPCS: Performed by: COLON & RECTAL SURGERY

## 2022-03-17 PROCEDURE — 6370000000 HC RX 637 (ALT 250 FOR IP): Performed by: COLON & RECTAL SURGERY

## 2022-03-17 PROCEDURE — 85025 COMPLETE CBC W/AUTO DIFF WBC: CPT

## 2022-03-17 PROCEDURE — 86141 C-REACTIVE PROTEIN HS: CPT

## 2022-03-17 PROCEDURE — 80069 RENAL FUNCTION PANEL: CPT

## 2022-03-17 PROCEDURE — 1210000000 HC MED SURG R&B

## 2022-03-17 PROCEDURE — 97535 SELF CARE MNGMENT TRAINING: CPT

## 2022-03-17 PROCEDURE — 84145 PROCALCITONIN (PCT): CPT

## 2022-03-17 PROCEDURE — 2580000003 HC RX 258: Performed by: COLON & RECTAL SURGERY

## 2022-03-17 PROCEDURE — 99232 SBSQ HOSP IP/OBS MODERATE 35: CPT | Performed by: INTERNAL MEDICINE

## 2022-03-17 PROCEDURE — 99024 POSTOP FOLLOW-UP VISIT: CPT | Performed by: COLON & RECTAL SURGERY

## 2022-03-17 PROCEDURE — 99211 OFF/OP EST MAY X REQ PHY/QHP: CPT

## 2022-03-17 PROCEDURE — 6360000002 HC RX W HCPCS: Performed by: COLON & RECTAL SURGERY

## 2022-03-17 PROCEDURE — 36415 COLL VENOUS BLD VENIPUNCTURE: CPT

## 2022-03-17 PROCEDURE — 83735 ASSAY OF MAGNESIUM: CPT

## 2022-03-17 RX ADMIN — OXYCODONE AND ACETAMINOPHEN 2 TABLET: 5; 325 TABLET ORAL at 18:51

## 2022-03-17 RX ADMIN — DOXYCYCLINE HYCLATE 100 MG: 100 CAPSULE ORAL at 20:23

## 2022-03-17 RX ADMIN — LACTOBACILLUS TAB 1 TABLET: TAB at 10:56

## 2022-03-17 RX ADMIN — CLINDAMYCIN IN 5 PERCENT DEXTROSE 900 MG: 18 INJECTION, SOLUTION INTRAVENOUS at 06:06

## 2022-03-17 RX ADMIN — OXYCODONE AND ACETAMINOPHEN 2 TABLET: 5; 325 TABLET ORAL at 10:58

## 2022-03-17 RX ADMIN — Medication 10 ML: at 10:58

## 2022-03-17 RX ADMIN — CLINDAMYCIN IN 5 PERCENT DEXTROSE 900 MG: 18 INJECTION, SOLUTION INTRAVENOUS at 00:20

## 2022-03-17 RX ADMIN — LEVOTHYROXINE SODIUM 50 MCG: 0.05 TABLET ORAL at 10:56

## 2022-03-17 RX ADMIN — OXYCODONE AND ACETAMINOPHEN 2 TABLET: 5; 325 TABLET ORAL at 22:47

## 2022-03-17 RX ADMIN — Medication 10 ML: at 20:24

## 2022-03-17 RX ADMIN — ENOXAPARIN SODIUM 60 MG: 60 INJECTION SUBCUTANEOUS at 10:56

## 2022-03-17 RX ADMIN — CLINDAMYCIN IN 5 PERCENT DEXTROSE 900 MG: 18 INJECTION, SOLUTION INTRAVENOUS at 10:59

## 2022-03-17 RX ADMIN — MICONAZOLE NITRATE: 2 POWDER TOPICAL at 20:24

## 2022-03-17 RX ADMIN — LACTOBACILLUS TAB 1 TABLET: TAB at 20:24

## 2022-03-17 RX ADMIN — TOPIRAMATE 25 MG: 25 TABLET, FILM COATED ORAL at 20:24

## 2022-03-17 RX ADMIN — MICONAZOLE NITRATE: 2 POWDER TOPICAL at 10:58

## 2022-03-17 RX ADMIN — CLINDAMYCIN IN 5 PERCENT DEXTROSE 900 MG: 18 INJECTION, SOLUTION INTRAVENOUS at 20:28

## 2022-03-17 RX ADMIN — TOPIRAMATE 50 MG: 25 TABLET, FILM COATED ORAL at 10:56

## 2022-03-17 RX ADMIN — OXYCODONE AND ACETAMINOPHEN 2 TABLET: 5; 325 TABLET ORAL at 06:06

## 2022-03-17 RX ADMIN — DOXYCYCLINE HYCLATE 100 MG: 100 CAPSULE ORAL at 10:56

## 2022-03-17 ASSESSMENT — PAIN DESCRIPTION - PAIN TYPE: TYPE: CHRONIC PAIN

## 2022-03-17 ASSESSMENT — PAIN SCALES - GENERAL
PAINLEVEL_OUTOF10: 8
PAINLEVEL_OUTOF10: 4
PAINLEVEL_OUTOF10: 7

## 2022-03-17 ASSESSMENT — ENCOUNTER SYMPTOMS
COLOR CHANGE: 1
GASTROINTESTINAL NEGATIVE: 1
RESPIRATORY NEGATIVE: 1

## 2022-03-17 ASSESSMENT — PAIN DESCRIPTION - LOCATION: LOCATION: LEG

## 2022-03-17 NOTE — FLOWSHEET NOTE
Assessment completed. VS WNL. PM meds given including doxyxcyline; PRN Percocet given for 8/10 pain. Redness noted in abd folds, groin area-micotin powder applied. Right leg drsg clean, dry, intact; new dry drsg apply to left leg. Wound vac patent at 125, foam drsg in place. Denies further needs at the moment. Standard safety precaution in place. Will continue to monitor.

## 2022-03-17 NOTE — PROGRESS NOTES
Wound Ostomy Continence Nursing    This [id number] [facility] Nurse applied wound VAC while patient was in the [address] yesterday. This morning this [address] [address] [facility] Nurse explained the purpose and process of NPWT. All questions answered at this time. Wound VAC is functioning properly, no seal leaks detected. Next dressing change will be tomorrow, 3/18.     Electronically signed by SHELLIE Moran, RN, [employer] on 3/17/2022 at 9:28 AM

## 2022-03-17 NOTE — PROGRESS NOTES
Hospitalist Progress Note      PCP: Eren Summers    Date of Admission: 3/14/2022    Chief Complaint:  No acute events, afebrile, stable HD, s/p  I&D of left leg abscess yesterday    Medications:  Reviewed    Infusion Medications    sodium chloride      dextrose       Scheduled Medications    enoxaparin  60 mg SubCUTAneous BID    miconazole   Topical BID    doxycycline  100 mg Oral 2 times per day    lactobacillus acidophilus  1 tablet Oral BID    topiramate  50 mg Oral QAM    And    topiramate  25 mg Oral Nightly    sodium chloride flush  5-40 mL IntraVENous 2 times per day    insulin lispro  0-6 Units SubCUTAneous TID WC    insulin lispro  0-3 Units SubCUTAneous Nightly    clindamycin (CLEOCIN) IV  900 mg IntraVENous Q6H    levothyroxine  50 mcg Oral Daily     PRN Meds: HYDROmorphone, oxyCODONE-acetaminophen **OR** oxyCODONE-acetaminophen, HYDROmorphone **OR** HYDROmorphone, sodium chloride flush, sodium chloride, ondansetron **OR** ondansetron, polyethylene glycol, acetaminophen **OR** acetaminophen, glucose, glucagon (rDNA), dextrose, dextrose bolus (hypoglycemia) **OR** dextrose bolus (hypoglycemia), morphine      Intake/Output Summary (Last 24 hours) at 3/17/2022 1403  Last data filed at 3/17/2022 1123  Gross per 24 hour   Intake 925.37 ml   Output 600 ml   Net 325.37 ml       Exam:    BP (!) 125/57   Pulse 72   Temp 97.9 °F (36.6 °C) (Oral)   Resp 18   Ht 5' (1.524 m)   Wt (!) 388 lb (176 kg)   SpO2 95%   BMI 75.78 kg/m²     General appearance: alert, cooperative  Lungs: clear to auscultation bilaterally  Heart: regular rate and rhythm and S1, S2 normal  Abdomen: soft, BS active  Extremities: left LE edema and erythema with induration near the left knee area, chronic lymphedema of the LE bilaterally, right leg covered with dressing      Labs:   Recent Labs     03/15/22  0522 03/16/22  0509 03/17/22  0502   WBC 9.1 7.1 7.1   HGB 9.9* 9.1* 9.4*   HCT 30.8* 28.9* 29.9*    188 220 Recent Labs     03/15/22  0522 03/16/22  0508 03/17/22  0502    139 138   K 4.3 4.3 4.5    108* 105   CO2 20 18* 19*   BUN 26* 25* 25*   CREATININE 1.32* 1.37* 1.22*   CALCIUM 8.6 8.9 8.9   PHOS 2.6 3.0 4.0     Recent Labs     03/14/22  1630   AST 16   ALT 17   BILITOT 0.9*   ALKPHOS 144*     No results for input(s): INR in the last 72 hours. Recent Labs     03/14/22  1630   CKTOTAL 24       Urinalysis:      Lab Results   Component Value Date    NITRU Negative 01/06/2019    WBCUA 3-5 01/06/2019    BACTERIA Negative 01/06/2019    RBCUA 0-2 01/06/2019    BLOODU Negative 01/06/2019    SPECGRAV 1.031 01/06/2019    GLUCOSEU Negative 01/06/2019       Radiology:  XR TIBIA FIBULA LEFT (2 VIEWS)   Final Result   DEGENERATIVE CHANGE LEFT KNEE, AND LEFT HEEL. NO FRACTURE. US DUP LOWER EXTREMITY LEFT YVETTE   Final Result   7.5 x 6.7 x 1.9 cm complex fluid collection involving subcutaneous tissues of left knee consistent with abscess or hematoma  . EXAMINATION: US SOFT TISSUE LIMITED AREA, US DUP LOWER EXTREMITY LEFT YVETTE      DATE AND TIME:3/14/2022 5:02 PM      CLINICAL HISTORY: Leg pain. Leg swelling. Thrombophlebitis, possible. Possible superficial and deep vein thrombosis, shortness of breath, dyspnea and edema   over left knee scab ? abscess             COMPARISON: None      TECHNIQUE: The lower extremity veins were evaluated with color doppler, gray scale imaging and spectral analysis while using compression and augmentation when possible. FINDINGS: Evaluation of the left lower extremity from the thigh to the knee shows normal phasic flow, normal augmentation of the Doppler signal, and normal compression of the deep veins. There is no sonographic evidence for acute deep venous thrombosis    from the left groin to the popliteal region.          IMPRESSION:NEGATIVE FOR ACUTE DVT OF THE LEFT LOWER EXTREMITY FROM THE  GROIN TO THE KNEE.        US SOFT TISSUE LIMITED AREA Final Result   7.5 x 6.7 x 1.9 cm complex fluid collection involving subcutaneous tissues of left knee consistent with abscess or hematoma  . EXAMINATION: US SOFT TISSUE LIMITED AREA, US DUP LOWER EXTREMITY LEFT YVETTE      DATE AND TIME:3/14/2022 5:02 PM      CLINICAL HISTORY: Leg pain. Leg swelling. Thrombophlebitis, possible. Possible superficial and deep vein thrombosis, shortness of breath, dyspnea and edema   over left knee scab ? abscess             COMPARISON: None      TECHNIQUE: The lower extremity veins were evaluated with color doppler, gray scale imaging and spectral analysis while using compression and augmentation when possible. FINDINGS: Evaluation of the left lower extremity from the thigh to the knee shows normal phasic flow, normal augmentation of the Doppler signal, and normal compression of the deep veins. There is no sonographic evidence for acute deep venous thrombosis    from the left groin to the popliteal region. IMPRESSION:NEGATIVE FOR ACUTE DVT OF THE LEFT LOWER EXTREMITY FROM THE  GROIN TO THE KNEE.                 Assessment/Plan:    62 y.o. female with PMH of HTN, DM2, CKD3, morbid obesity, DEEP, chronic lymphedema of the LE who presented with:     Purulent cellulitis of LLE with abscess  - failed outpatient antibiotic therapy  - doppler u/s showed a complex fluid collection involving subcutaneous tissues of left knee concerning for abscess,   - blood cultures no growth  - on IV Clindamycin and PO Doxycycline,   - s/p I&D of LLE abscess with wound VAC placement   - wound culture positive for GNB  - management per ID,surgical and wound care service      DM2 with hyperglycemia  - ISS, hypoglycemia protocol     CKD3  - monitor renal function    Microcytic anemia  - follow H/H    DEEP  - continue CPAP at HS       Disposition - SNF,  following      Electronically signed by Giancarlo Castelan MD on 3/17/2022 at 2:03 PM

## 2022-03-17 NOTE — PROGRESS NOTES
Infectious Disease     Patient Name: Iris Eason  Date: 3/17/2022  YOB: 1960  Medical Record Number: [id number]        Left lower extremity cellulitis  Left knee abscess      03/16/2022  POSTOPERATIVE DIAGNOSIS:  Left leg subcutaneous abscess. PROCEDURE:  1. Incision and drainage of subcutaneous abscess measuring  approximately 10 x 8 cm. 2.  Negative pressure wound VAC          US DUP LOWER EXTREMITY LEFT YVETTE [[id number]] Collected: 03/14/22 1753 Updated: 03/14/22 1802 Narrative:  EXAMINATION: US SOFT TISSUE LIMITED AREA, US DUP LOWER EXTREMITY LEFT YVETTE     COMPARISON:None     CLINICAL HISTORY:  over left knee scab ? abscess      FINDINGS:Targeted ultrasound scans of the left anterior knee. Significant subcutaneous edema with a 6.7 x 7.5 x 1.9 cm complex hypoechoic area lateral to the skin defect on the left knee that is compatible with abscess or complex hematoma. Impression:  7.5 x 6.7 x 1.9 cm complex fluid collection involving subcutaneous tissues of left knee consistent with abscess or hematoma  .    Impression:  DEGENERATIVE CHANGE LEFT KNEE, AND LEFT HEEL. NO FRACTURE           Review of Systems   Constitutional: Negative for chills, diaphoresis, fatigue and fever. Respiratory: Negative. Cardiovascular: Negative. Gastrointestinal: Negative. Skin: Positive for color change. Physical Exam  Cardiovascular:      Heart sounds: Normal heart sounds. No murmur heard. Pulmonary:      Effort: No respiratory distress. Breath sounds: No wheezing or rales. Abdominal:      General: Abdomen is flat. Bowel sounds are normal. There is no distension. Palpations: Abdomen is soft. Tenderness: There is no abdominal tenderness. There is no guarding. Musculoskeletal:         General: Swelling and tenderness present. Blood pressure (!) 125/57, pulse 72, temperature 97.9 °F (36.6 °C), temperature source Oral, resp.  rate 18, height 5' (1.524 m), weight (!) 388 lb (176 kg), SpO2 95 %.       .   Lab Results   Component Value Date    WBC 7.1 03/17/2022    HGB 9.4 (L) 03/17/2022    HCT 29.9 (L) 03/17/2022    MCV 77.9 (L) 03/17/2022     03/17/2022     Lab Results   Component Value Date     03/17/2022    K 4.5 03/17/2022    K 3.8 01/07/2019     03/17/2022    CO2 19 03/17/2022    BUN 25 03/17/2022    CREATININE 1.22 03/17/2022    GLUCOSE 98 03/17/2022    CALCIUM 8.9 03/17/2022         3/16/22 1331   CULTURE WOUND Direct Exam:  MANY NEUTROPHILS   Direct Exam:  RARE GRAM NEGATIVE RODS                   PLAN:    Left lower extremity cellulitis  Left leg abscess    On clindamycin doxycycline

## 2022-03-17 NOTE — PROGRESS NOTES
Shift assessment complete. VSS. Pt is Axox4. Tearful, anxious at this time. Meds given per mar. 2 percocet for 7/10 pain. Pt had gotten up to [facility]. Bathed with fresh linens by PCA. Lungs clear. abd rounded, bowel sounds present. Wound vac is CDI, hooked to suction. Call light within reach. Will continue to monitor.     Electronically signed by Claritza Angel RN on 3/17/2022 at 11:27 AM

## 2022-03-17 NOTE — OP NOTE
[address]                      [facility], [address] [facility]                                OPERATIVE REPORT    PATIENT NAME: Brigette Heaton                     :        1960  MED REC NO:   [id number]                            ROOM:       [id number]  ACCOUNT NO:   [de-identified]                           ADMIT DATE: 2022  PROVIDER:     Dalton Bartlett MD    DATE OF PROCEDURE:  2022    PREOPERATIVE DIAGNOSIS:  Left leg subcutaneous abscess. POSTOPERATIVE DIAGNOSIS:  Left leg subcutaneous abscess. PROCEDURE:  1. Incision and drainage of subcutaneous abscess measuring  approximately 10 x 8 cm. 2.  Negative pressure wound VAC placement with assistance of wound  nurse. SURGEON:  Dalton Bartlett MD    ASSISTANT:  Ms. Abimbola Tijerina. ANESTHESIA:  General anesthesia. ESTIMATED BLOOD LOSS:  30 mL. SPECIMEN:  Cultures. COMPLICATIONS:  None. INDICATIONS:  A 62year-old patient with significant bilateral  lymphedema lower extremities. She has a large abscess seen on  ultrasound as well as clinically. I discussed the risks and benefits of  incising and draining this wound and placing a wound VAC for assistance  with wound healing long-term. Risks of surgery including infection, bleeding, damage to the underlying  structures, reoperation and recurrence were discussed. Despite these  risks, she wished to proceed. Consent obtained. OPERATIVE PROCEDURE:  She was taken to the operating room, placed in the  supine position. General anesthesia was administered. Her left leg was  prepped and draped with a ChloraPrep-containing solution. She received  clindamycin preoperatively. A time-out was taken for appropriate  verification and verification of laterality in conjunction with  ultrasound images. An incision was made over the scab which had spontaneously closed. There was approximately 150 mL of purulent fluid present.   Cultures were  taken. The incision was extended laterally until the entire space was  opened and no tunneling was present. The wound was then irrigated with Pulsavac with 1000 mL of saline. Excellent hemostasis was achieved and assured with cautery. The ostomy nurse, Ms. Eddie Nieves was called in the operating room for an  intraoperative consultation and she agreed with placing a wound VAC for  wound care. She placed that wound VAC using the KCI black sponge. The wound VAC held well without leak. At the end of procedure, all lap, needle, and instrument counts were  correct. The patient was extubated and taken to recovery for postop monitoring.         Dakota Bae MD    D: 03/16/2022 13:54:05       T: 03/16/2022 13:56:30     [id number]  Job#: [id number]     Doc#: [id number]    CC:

## 2022-03-17 NOTE — PROGRESS NOTES
[facility] MEDICINE AND SURGERY  Progress note      Patient Name: Mesfin Tomlinson  MRN: [id number]    FOLLOW UP REGARDING:  Bilateral leg wounds    ASSESSMENT:    Patient is a 62year old female that presents to the hospital with complains of left knee pain and bilateral leg wounds. Leg wounds are consistent with venous stasis ulcerations in the setting of severe edema and do not appear clinically infected at this time. PLAN AND RECOMMENDATIONS:  Exam and evaluation  Leg wounds dressed with Maxsorb and overlying DSD. Plan to change dressing daily. Nursing instructions in for dressing changes. Keep BLE elevated above the level of the heart  Antibiotics per primary / ID recommendations  Podiatry will continue to follow while patient is in house    INTERVAL HPI and PERTINENT ROS:   NAEO and HDS per recorded vital signs  S/p I&D of left knee with general surgery. 150 cc of pus was noted intraoperatively and wound VAC in place  No constitutional symptoms     OBJECTIVE:  BP (!) 125/57   Pulse 72   Temp 97.9 °F (36.6 °C) (Oral)   Resp 18   Ht 5' (1.524 m)   Wt (!) 388 lb (176 kg)   SpO2 95%   BMI 75.78 kg/m²   Patient is alert and oriented x 3 in NAD. Vascular:  Non-Palpable Dorsalis Pedis and Non-Palpable Posterior Tibial Pulses B/L due to edema  Audible biphasic DP and PT pulses bilaterally  Capillary Fill time < 3 seconds to B/L digits  Skin temperature warm to warm tibial tuberosity to the digits B/L  Hair growth absent to digits  Severe 3+ pitting edema to bilateral lower extremities with ruborous changes consistent with stasis dermatitis     Neurological:   Epicritic sensation intact B/L  Protective sensation via monofilament testing intact B/L     Musculoskeletal/Orthopaedic:   4/5 muscle strength Dorsiflexion, Plantarflexion, Inversion, Eversion B/L  Global tenderness to bilateral feet and legs     Dermatological:   Skin appears well hydrated and supple with good temperature, texture, turgor.    No hyperkeratosis noted. Interspaces 1-4 are clear and without debris B/L. Nails 1-5 appear thickened and elongated bilaterally  Open lesions as noted below:     Ulceration #1:   Location: Left lateral leg  Measurements: 8 cm x 3 cm x 0.1 cm  Base: Mixed Granular/Fibrotic  Borders: Viable   Exudate: Moderate serous  Comments: No erythema extending beyond borders with no evidence of ascending lymphangitis. Wound does not undermine, does not probe to bone. Ulceration #2:   Location: Right lateral leg  Measurements: 7.5 cm x 3 cm x 0.1 cm  Base: Mixed Granular/Fibrotic  Borders: Viable   Exudate: Moderate serous  Comments: No erythema extending beyond borders with no evidence of ascending lymphangitis. Wound does not undermine, does not probe to bone. LABS:   Lab Results   Component Value Date    WBC 7.1 03/17/2022    HGB 9.4 (L) 03/17/2022    HCT 29.9 (L) 03/17/2022    MCV 77.9 (L) 03/17/2022     03/17/2022     Lab Results   Component Value Date     03/17/2022    K 4.5 03/17/2022    K 3.8 01/07/2019     03/17/2022    CO2 19 03/17/2022    BUN 25 03/17/2022    CREATININE 1.22 03/17/2022    GLUCOSE 98 03/17/2022    CALCIUM 8.9 03/17/2022      Lab Results   Component Value Date    LABALBU 3.0 (L) 03/17/2022     Lab Results   Component Value Date    SEDRATE 94 (H) 03/14/2022     Lab Results   Component Value Date    .9 (H) 03/14/2022     Lab Results   Component Value Date    LABA1C 6.4 (H) 03/15/2022     No results found for: EAG    MICROBIOLOGY:   Blood cultures x2 3/14 pending        IMAGING:   XR Tibia Fibula Left (3/14/2022)  Decrease in joint space medial compartment, lateral compartment, patellofemoral compartment. Ankle mortise intact. Posterior and plantar spurring of calcaneus. Osteopenia. No fracture. No bone lesion. Patient's case discussed with staff and agrees with final recommendations outlined above.       Casey Primrose, EVARISTO, PGY2  Please first page Podiatry On Call, [phone]  March 17, 2022  4:21 PM

## 2022-03-17 NOTE — CARE COORDINATION
This LSW met with patient at bedside this am. Patient is alert, oriented and comfortable. Patient is is comfortable and has no concerns. D/C plan reviewed. D/C PLAN : [facility]  I have sent referral to Dean Schumacher at [facility] acceptance at this time. LSW / CM to follow.   Electronically signed by ELZA Hatch, ERYN on 3/17/22 at 11:16 AM EDT

## 2022-03-18 LAB
ALBUMIN SERPL-MCNC: 3 G/DL (ref 3.5–4.6)
ANION GAP SERPL CALCULATED.3IONS-SCNC: 12 MEQ/L (ref 9–15)
BASOPHILS ABSOLUTE: 0 K/UL (ref 0–0.2)
BASOPHILS RELATIVE PERCENT: 0.5 %
BUN BLDV-MCNC: 24 MG/DL (ref 8–23)
C-REACTIVE PROTEIN, HIGH SENSITIVITY: 181.9 MG/L (ref 0–5)
CALCIUM SERPL-MCNC: 9 MG/DL (ref 8.5–9.9)
CHLORIDE BLD-SCNC: 104 MEQ/L (ref 95–107)
CO2: 19 MEQ/L (ref 20–31)
CREAT SERPL-MCNC: 1.17 MG/DL (ref 0.5–0.9)
EOSINOPHILS ABSOLUTE: 0.2 K/UL (ref 0–0.7)
EOSINOPHILS RELATIVE PERCENT: 2.7 %
FERRITIN: 317 UG/L (ref 13–150)
FOLATE: 4.3 NG/ML
GFR AFRICAN AMERICAN: 57
GFR NON-AFRICAN AMERICAN: 47.1
GLUCOSE BLD-MCNC: 114 MG/DL (ref 70–99)
GLUCOSE BLD-MCNC: 115 MG/DL (ref 70–99)
GLUCOSE BLD-MCNC: 126 MG/DL (ref 70–99)
GLUCOSE BLD-MCNC: 165 MG/DL (ref 70–99)
GLUCOSE BLD-MCNC: 75 MG/DL (ref 70–99)
HCT VFR BLD CALC: 30 % (ref 37–47)
HEMOGLOBIN: 9.3 G/DL (ref 12–16)
IRON SATURATION: 17 % (ref 20–55)
IRON: 38 UG/DL (ref 37–145)
LYMPHOCYTES ABSOLUTE: 1.1 K/UL (ref 1–4.8)
LYMPHOCYTES RELATIVE PERCENT: 15.6 %
MAGNESIUM: 2.1 MG/DL (ref 1.7–2.4)
MCH RBC QN AUTO: 24.1 PG (ref 27–31.3)
MCHC RBC AUTO-ENTMCNC: 31.1 % (ref 33–37)
MCV RBC AUTO: 77.3 FL (ref 82–100)
MONOCYTES ABSOLUTE: 0.6 K/UL (ref 0.2–0.8)
MONOCYTES RELATIVE PERCENT: 9.1 %
NEUTROPHILS ABSOLUTE: 4.9 K/UL (ref 1.4–6.5)
NEUTROPHILS RELATIVE PERCENT: 72.1 %
PDW BLD-RTO: 17.7 % (ref 11.5–14.5)
PERFORMED ON: ABNORMAL
PERFORMED ON: NORMAL
PHOSPHORUS: 3.7 MG/DL (ref 2.3–4.8)
PLATELET # BLD: 222 K/UL (ref 130–400)
POTASSIUM SERPL-SCNC: 4.4 MEQ/L (ref 3.4–4.9)
PROCALCITONIN: 0.61 NG/ML (ref 0–0.15)
RBC # BLD: 3.88 M/UL (ref 4.2–5.4)
SODIUM BLD-SCNC: 135 MEQ/L (ref 135–144)
TOTAL IRON BINDING CAPACITY: 224 UG/DL (ref 250–450)
UNSATURATED IRON BINDING CAPACITY: 186 UG/DL (ref 112–347)
VITAMIN B-12: 1440 PG/ML (ref 232–1245)
VITAMIN D 25-HYDROXY: 32.5 NG/ML
WBC # BLD: 6.8 K/UL (ref 4.8–10.8)

## 2022-03-18 PROCEDURE — 83540 ASSAY OF IRON: CPT

## 2022-03-18 PROCEDURE — 6370000000 HC RX 637 (ALT 250 FOR IP): Performed by: INTERNAL MEDICINE

## 2022-03-18 PROCEDURE — 80069 RENAL FUNCTION PANEL: CPT

## 2022-03-18 PROCEDURE — 82306 VITAMIN D 25 HYDROXY: CPT

## 2022-03-18 PROCEDURE — 99213 OFFICE O/P EST LOW 20 MIN: CPT

## 2022-03-18 PROCEDURE — 97116 GAIT TRAINING THERAPY: CPT

## 2022-03-18 PROCEDURE — 83735 ASSAY OF MAGNESIUM: CPT

## 2022-03-18 PROCEDURE — 6370000000 HC RX 637 (ALT 250 FOR IP): Performed by: COLON & RECTAL SURGERY

## 2022-03-18 PROCEDURE — 84145 PROCALCITONIN (PCT): CPT

## 2022-03-18 PROCEDURE — 83550 IRON BINDING TEST: CPT

## 2022-03-18 PROCEDURE — 99232 SBSQ HOSP IP/OBS MODERATE 35: CPT | Performed by: INTERNAL MEDICINE

## 2022-03-18 PROCEDURE — 1210000000 HC MED SURG R&B

## 2022-03-18 PROCEDURE — 97535 SELF CARE MNGMENT TRAINING: CPT

## 2022-03-18 PROCEDURE — 86141 C-REACTIVE PROTEIN HS: CPT

## 2022-03-18 PROCEDURE — 82746 ASSAY OF FOLIC ACID SERUM: CPT

## 2022-03-18 PROCEDURE — 36415 COLL VENOUS BLD VENIPUNCTURE: CPT

## 2022-03-18 PROCEDURE — 85025 COMPLETE CBC W/AUTO DIFF WBC: CPT

## 2022-03-18 PROCEDURE — 82728 ASSAY OF FERRITIN: CPT

## 2022-03-18 PROCEDURE — 6360000002 HC RX W HCPCS: Performed by: INTERNAL MEDICINE

## 2022-03-18 PROCEDURE — 2580000003 HC RX 258: Performed by: COLON & RECTAL SURGERY

## 2022-03-18 PROCEDURE — 2500000003 HC RX 250 WO HCPCS: Performed by: COLON & RECTAL SURGERY

## 2022-03-18 PROCEDURE — 82607 VITAMIN B-12: CPT

## 2022-03-18 RX ORDER — FOLIC ACID 1 MG/1
1 TABLET ORAL DAILY
Status: DISCONTINUED | OUTPATIENT
Start: 2022-03-18 | End: 2022-03-23 | Stop reason: HOSPADM

## 2022-03-18 RX ADMIN — OXYCODONE AND ACETAMINOPHEN 2 TABLET: 5; 325 TABLET ORAL at 03:37

## 2022-03-18 RX ADMIN — FOLIC ACID 1 MG: 1 TABLET ORAL at 17:02

## 2022-03-18 RX ADMIN — LEVOTHYROXINE SODIUM 50 MCG: 0.05 TABLET ORAL at 05:56

## 2022-03-18 RX ADMIN — OXYCODONE AND ACETAMINOPHEN 2 TABLET: 5; 325 TABLET ORAL at 21:48

## 2022-03-18 RX ADMIN — OXYCODONE AND ACETAMINOPHEN 2 TABLET: 5; 325 TABLET ORAL at 09:23

## 2022-03-18 RX ADMIN — ENOXAPARIN SODIUM 40 MG: 100 INJECTION SUBCUTANEOUS at 09:23

## 2022-03-18 RX ADMIN — CLINDAMYCIN IN 5 PERCENT DEXTROSE 900 MG: 18 INJECTION, SOLUTION INTRAVENOUS at 02:41

## 2022-03-18 RX ADMIN — CLINDAMYCIN IN 5 PERCENT DEXTROSE 900 MG: 18 INJECTION, SOLUTION INTRAVENOUS at 23:46

## 2022-03-18 RX ADMIN — CLINDAMYCIN IN 5 PERCENT DEXTROSE 900 MG: 18 INJECTION, SOLUTION INTRAVENOUS at 09:25

## 2022-03-18 RX ADMIN — TOPIRAMATE 25 MG: 25 TABLET, FILM COATED ORAL at 21:48

## 2022-03-18 RX ADMIN — Medication 10 ML: at 21:50

## 2022-03-18 RX ADMIN — LACTOBACILLUS TAB 1 TABLET: TAB at 09:23

## 2022-03-18 RX ADMIN — TOPIRAMATE 50 MG: 25 TABLET, FILM COATED ORAL at 09:23

## 2022-03-18 RX ADMIN — DOXYCYCLINE HYCLATE 100 MG: 100 CAPSULE ORAL at 21:50

## 2022-03-18 RX ADMIN — CLINDAMYCIN IN 5 PERCENT DEXTROSE 900 MG: 18 INJECTION, SOLUTION INTRAVENOUS at 17:04

## 2022-03-18 RX ADMIN — Medication 10 ML: at 09:29

## 2022-03-18 RX ADMIN — MICONAZOLE NITRATE: 2 POWDER TOPICAL at 09:24

## 2022-03-18 RX ADMIN — OXYCODONE AND ACETAMINOPHEN 2 TABLET: 5; 325 TABLET ORAL at 17:02

## 2022-03-18 RX ADMIN — LACTOBACILLUS TAB 1 TABLET: TAB at 21:50

## 2022-03-18 RX ADMIN — DOXYCYCLINE HYCLATE 100 MG: 100 CAPSULE ORAL at 09:23

## 2022-03-18 ASSESSMENT — PAIN DESCRIPTION - LOCATION
LOCATION: LEG

## 2022-03-18 ASSESSMENT — PAIN SCALES - GENERAL
PAINLEVEL_OUTOF10: 10
PAINLEVEL_OUTOF10: 8
PAINLEVEL_OUTOF10: 6
PAINLEVEL_OUTOF10: 7
PAINLEVEL_OUTOF10: 8
PAINLEVEL_OUTOF10: 8
PAINLEVEL_OUTOF10: 7

## 2022-03-18 ASSESSMENT — PAIN DESCRIPTION - PAIN TYPE
TYPE: CHRONIC PAIN

## 2022-03-18 ASSESSMENT — PAIN DESCRIPTION - ORIENTATION
ORIENTATION: LEFT

## 2022-03-18 ASSESSMENT — ENCOUNTER SYMPTOMS
GASTROINTESTINAL NEGATIVE: 1
COLOR CHANGE: 1
RESPIRATORY NEGATIVE: 1

## 2022-03-18 ASSESSMENT — PAIN DESCRIPTION - PROGRESSION: CLINICAL_PROGRESSION: NOT CHANGED

## 2022-03-18 ASSESSMENT — PAIN DESCRIPTION - ONSET: ONSET: ON-GOING

## 2022-03-18 ASSESSMENT — PAIN DESCRIPTION - FREQUENCY: FREQUENCY: CONTINUOUS

## 2022-03-18 ASSESSMENT — PAIN DESCRIPTION - DESCRIPTORS: DESCRIPTORS: SORE

## 2022-03-18 NOTE — PROGRESS NOTES
Wound Ostomy Continence Nurse  Follow-up Progress Note       NAME:  Wagner Angel RECORD NUMBER:  [id number]  AGE:  62 y.o. GENDER:  female  :  1960  TODAY'S DATE:  3/18/2022    Subjective:   Wound Identification:  Wound Type: Surgical, S/p I&D  Contributing Factors: diabetes, obesity, malnutrition and Abscess        Patient Goal of Care:  [] Wound Healing  [] Odor Control  [] Palliative Care  [] Pain Control   [] Other:     Objective:    /63   Pulse 66   Temp 97.2 °F (36.2 °C) (Oral)   Resp 18   Ht 5' (1.524 m)   Wt (!) 388 lb (176 kg)   SpO2 100%   BMI 75.78 kg/m²   Duarte Risk Score: Duarte Scale Score: 16  Assessment:   Measurements:  Negative Pressure Wound Therapy Knee Anterior; Left (Active)   $ Standard NPWT <=50 sq cm PER TX $ Yes 22 1034   Wound Type Surgical 22 1034   Unit Type KCI 22 1034   Dressing Type Black foam 22 1034   Number of pieces used 1 22 1034   Cycle Continuous 22 1034   Target Pressure (mmHg) 125 22 1034   Canister changed? No 22 1034   Dressing Status Changed 22 1034   Dressing Changed Changed/New 22 1034   Drainage Amount Scant 22 1034   Drainage Description Serosanguinous 22 1034   Dressing Change Due 22 1034   Wound Assessment Red;Granulation tissue 22 1034   Odor None 22 1034   Number of days: 1       Wound 22 Pelvis Left open area skin fold: Moisture Associated Skin Damage (MASD) (Active)   Wound Etiology Other 03/15/22 1005   Dressing Status Other (Comment) 22 1133   Dressing/Treatment Pharmaceutical agent (see MAR) 22 0929   Wound Assessment Pink/red 22 0929   Drainage Amount None 22 0929   Odor None 22 0929   Number of days: 3       Wound 22 Leg Right;Distal;Lateral (Active)   Wound Etiology Venous 22 0929   Dressing Status Clean;Dry; Intact 22 1898 Dressing/Treatment Dry dressing 03/16/22 2205   Wound Length (cm) 14 cm 03/15/22 1005   Wound Width (cm) 4 cm 03/15/22 1005   Wound Depth (cm) 0.1 cm 03/15/22 1005   Wound Surface Area (cm^2) 56 cm^2 03/15/22 1005   Wound Volume (cm^3) 5.6 cm^3 03/15/22 1005   Wound Assessment Pink/red 03/15/22 1005   Drainage Amount Small 03/16/22 1133   Drainage Description Serous 03/15/22 1005   Odor None 03/17/22 1117   Lina-wound Assessment Maceration 03/15/22 1005   Margins Undefined edges 03/15/22 1005   Wound Thickness Description not for Pressure Injury Full thickness 03/15/22 1005   Number of days: 4       Wound 03/14/22 Leg Left;Distal;Lateral (Active)   Wound Etiology Venous 03/18/22 0929   Dressing Status Clean;Dry; Intact 03/18/22 0929   Dressing/Treatment Dry dressing 03/16/22 2205   Wound Length (cm) 9 cm 03/15/22 1005   Wound Width (cm) 3 cm 03/15/22 1005   Wound Depth (cm) 0.1 cm 03/15/22 1005   Wound Surface Area (cm^2) 27 cm^2 03/15/22 1005   Wound Volume (cm^3) 2.7 cm^3 03/15/22 1005   Wound Assessment Pink/red 03/17/22 1117   Drainage Amount Small 03/17/22 1117   Drainage Description Serous 03/17/22 1117   Odor None 03/17/22 1117   Lina-wound Assessment Maceration 03/15/22 1005   Margins Undefined edges 03/15/22 1005   Wound Thickness Description not for Pressure Injury Full thickness 03/15/22 1005   Number of days: 4     Incision 01/07/19 Perineum Left (Active)   Number of days: 0858       Incision 03/16/22 Knee Anterior; Left (Active)   Dressing Status New dressing applied 03/18/22 1034   Dressing Change Due 03/21/22 03/18/22 1034   Dressing/Treatment Negative pressure wound therapy 03/18/22 1034   Incision Length (cm) 1.5 03/16/22 1355   Incision Width (cm) 11 cm 03/16/22 1355   Incision Depth (cm) 2.5 cm 03/16/22 1355   Closure Other (Comment) 03/16/22 1355   Incision Assessment Other (Comment) 03/18/22 1034   Drainage Amount Scant 03/18/22 1034   Drainage Description Serosanguinous 03/18/22 1034   Odor None 03/18/22 1034   Lina-incision Assessment Edematous 03/18/22 1034   Number of days: 2     Assessment:    Wound VAC dressing changed today. Patient medicated prior to dressing change. Current sponge irrigated with saline. Foam gently removed. Wound bed is clean, red, and granular with scant serosanguinous drainage. Lina wound skin is edematous. Wound cleansed with saline and dried with 4x4s. Black foam gently packed in wound bed, assuring all areas of dead space were filled. Foam secured with clear transparent film. Hole cut to accommodate suction tubing. Wound VAC turned on, no seal leaks detected. Patient tolerated well. Plan:   Plan of Care: Wound 03/14/22 Pelvis Left open area skin fold: Moisture Associated Skin Damage (MASD)-Dressing/Treatment: Pharmaceutical agent (see MAR)  Wound 03/14/22 Leg Right;Distal;Lateral-Dressing/Treatment: Dry dressing  Wound 03/14/22 Leg Left;Distal;Lateral-Dressing/Treatment: Dry dressing    Specialty Bed Required : N/A   [] Low Air Loss   [] Pressure Redistribution  [] Fluid Immersion  [] Bariatric  [] Other:     Current Diet: ADULT DIET;  Regular  Dietician consult:  Yes    Discharge Plan:  Placement for patient upon discharge: skilled nursing   Patient appropriate for Outpatient [address] [facility]: Yes    Referrals:  []   [] [address] [place] [address]  [] Supplies  [] Other    Patient/Caregiver Teaching:  Level of patient/caregiver understanding able to:   [] Indicates understanding       [] Needs reinforcement  [] Unsuccessful      [] Verbal Understanding  [x] Demonstrated understanding       [] No evidence of learning  [] Refused teaching         [] N/A       Electronically signed by SHELLIE Gustafson, RN, CWOCN on 3/18/2022 at 10:48 AM

## 2022-03-18 NOTE — PROGRESS NOTES
Infectious Disease     Patient Name: Armani Lund  Date: 3/18/2022  YOB: 1960  Medical Record Number: [id number]        Left lower extremity cellulitis  Left knee abscess      03/16/2022  POSTOPERATIVE DIAGNOSIS:  Left leg subcutaneous abscess. PROCEDURE:  1. Incision and drainage of subcutaneous abscess measuring  approximately 10 x 8 cm. 2.  Negative pressure wound VAC          US DUP LOWER EXTREMITY LEFT YVETTE [[id number]] Collected: 03/14/22 1753 Updated: 03/14/22 1802 Narrative:  EXAMINATION: US SOFT TISSUE LIMITED AREA, US DUP LOWER EXTREMITY LEFT YVETTE     COMPARISON:None     CLINICAL HISTORY:  over left knee scab ? abscess      FINDINGS:Targeted ultrasound scans of the left anterior knee. Significant subcutaneous edema with a 6.7 x 7.5 x 1.9 cm complex hypoechoic area lateral to the skin defect on the left knee that is compatible with abscess or complex hematoma. Impression:  7.5 x 6.7 x 1.9 cm complex fluid collection involving subcutaneous tissues of left knee consistent with abscess or hematoma  .    Impression:  DEGENERATIVE CHANGE LEFT KNEE, AND LEFT HEEL. NO FRACTURE           Review of Systems   Constitutional: Negative for chills, diaphoresis, fatigue and fever. Respiratory: Negative. Cardiovascular: Negative. Gastrointestinal: Negative. Skin: Positive for color change. Physical Exam  Cardiovascular:      Heart sounds: Normal heart sounds. No murmur heard. Pulmonary:      Effort: No respiratory distress. Breath sounds: No wheezing or rales. Abdominal:      General: Abdomen is flat. Bowel sounds are normal. There is no distension. Palpations: Abdomen is soft. Tenderness: There is no abdominal tenderness. There is no guarding. Musculoskeletal:         General: Swelling and tenderness present. Blood pressure 136/63, pulse 66, temperature 97.2 °F (36.2 °C), temperature source Oral, resp.  rate 18, height 5' (1.524 m), weight (!) 388 lb (176 kg), SpO2 100 %.       .   Lab Results   Component Value Date    WBC 6.8 03/18/2022    HGB 9.3 (L) 03/18/2022    HCT 30.0 (L) 03/18/2022    MCV 77.3 (L) 03/18/2022     03/18/2022     Lab Results   Component Value Date     03/18/2022    K 4.4 03/18/2022    K 3.8 01/07/2019     03/18/2022    CO2 19 03/18/2022    BUN 24 03/18/2022    CREATININE 1.17 03/18/2022    GLUCOSE 115 03/18/2022    CALCIUM 9.0 03/18/2022         3/16/22 1331   CULTURE WOUND Direct Exam:  MANY NEUTROPHILS   Direct Exam:  RARE GRAM NEGATIVE RODS           Culture, Anaerobic and Aerobic  Order: [id number]   Status: Preliminary result     Visible to patient: No (not released)     Next appt: None    Specimen Information: Leg; Body Fluid         0 Result Notes    Component 3/16/22 1331   CULTURE WOUND Direct Exam:  MANY NEUTROPHILS   Direct Exam: [place] Marker NEGATIVE RODS   Cult,Aerobe/Anaerobe:  GRAM NEGATIVE RODS   Cult,Aerobe/Anaerobe:  MODERATE GROWTH   Performed at [facility] [id number] [place] [address], [address] [place] [address]   ([phone]                    PLAN:    Left lower extremity cellulitis  Left leg abscess  Growing anaerobic bacteria  On clindamycin doxycycline

## 2022-03-18 NOTE — PROGRESS NOTES
[facility] MEDICINE AND SURGERY  Progress note      Patient Name: Haydee Mittal  MRN: [id number]    FOLLOW UP REGARDING:  Bilateral leg wounds    ASSESSMENT:    Patient is a 62year old female that presents to the hospital with complains of left knee pain and bilateral leg wounds. Leg wounds are consistent with venous stasis ulcerations in the setting of severe edema and do not appear clinically infected at this time. PLAN AND RECOMMENDATIONS:  Exam and evaluation  Leg wounds dressed with Maxsorb and overlying DSD. Plan to change dressing daily. Nursing instructions in for dressing changes. Keep BLE elevated above the level of the heart  Antibiotics per primary / ID recommendations  Podiatry will continue to follow while patient is in house    INTERVAL HPI and PERTINENT ROS:   NAEO and HDS per recorded vital signs  Wound VAC changed today to left knee uneventfully. No constitutional symptoms     OBJECTIVE:  /63   Pulse 66   Temp 97.2 °F (36.2 °C) (Oral)   Resp 18   Ht 5' (1.524 m)   Wt (!) 388 lb (176 kg)   SpO2 100%   BMI 75.78 kg/m²   Patient is alert and oriented x 3 in NAD. Vascular:  Non-Palpable Dorsalis Pedis and Non-Palpable Posterior Tibial Pulses B/L due to edema  Audible biphasic DP and PT pulses bilaterally  Capillary Fill time < 3 seconds to B/L digits  Skin temperature warm to warm tibial tuberosity to the digits B/L  Hair growth absent to digits  Severe 3+ pitting edema to bilateral lower extremities with ruborous changes consistent with stasis dermatitis     Neurological:   Epicritic sensation intact B/L  Protective sensation via monofilament testing intact B/L     Musculoskeletal/Orthopaedic:   4/5 muscle strength Dorsiflexion, Plantarflexion, Inversion, Eversion B/L  Global tenderness to bilateral feet and legs     Dermatological:   Skin appears well hydrated and supple with good temperature, texture, turgor. No hyperkeratosis noted.  Interspaces 1-4 are clear and without debris B/L. Nails 1-5 appear thickened and elongated bilaterally  Open lesions as noted below:     Ulceration #1:   Location: Left lateral leg  Measurements: 8 cm x 3 cm x 0.1 cm  Base: Mixed Granular/Fibrotic  Borders: Viable   Exudate: Moderate serous  Comments: No erythema extending beyond borders with no evidence of ascending lymphangitis. Wound does not undermine, does not probe to bone. Ulceration #2:   Location: Right lateral leg  Measurements: 7.5 cm x 3 cm x 0.1 cm  Base: Mixed Granular/Fibrotic  Borders: Viable   Exudate: Moderate serous  Comments: No erythema extending beyond borders with no evidence of ascending lymphangitis. Wound does not undermine, does not probe to bone. LABS:   Lab Results   Component Value Date    WBC 6.8 03/18/2022    HGB 9.3 (L) 03/18/2022    HCT 30.0 (L) 03/18/2022    MCV 77.3 (L) 03/18/2022     03/18/2022     Lab Results   Component Value Date     03/18/2022    K 4.4 03/18/2022    K 3.8 01/07/2019     03/18/2022    CO2 19 03/18/2022    BUN 24 03/18/2022    CREATININE 1.17 03/18/2022    GLUCOSE 115 03/18/2022    CALCIUM 9.0 03/18/2022      Lab Results   Component Value Date    LABALBU 3.0 (L) 03/18/2022     Lab Results   Component Value Date    SEDRATE 94 (H) 03/14/2022     Lab Results   Component Value Date    .9 (H) 03/14/2022     Lab Results   Component Value Date    LABA1C 6.4 (H) 03/15/2022     No results found for: EAG    MICROBIOLOGY:   Blood cultures x2 3/14 NGTD        IMAGING:   XR Tibia Fibula Left (3/14/2022)  Decrease in joint space medial compartment, lateral compartment, patellofemoral compartment. Ankle mortise intact. Posterior and plantar spurring of calcaneus. Osteopenia. No fracture. No bone lesion. Patient's case discussed with staff and agrees with final recommendations outlined above.       Aaron Browne DPM, PGY2  Please first page Podiatry On Call, [phone]  March 18, 2022  11:18 AM

## 2022-03-18 NOTE — PROGRESS NOTES
Hospitalist Progress Note      PCP: Sharon Ibrahim    Date of Admission: 3/14/2022    Chief Complaint:  No acute events, afebrile, stable HD,     Medications:  Reviewed    Infusion Medications    sodium chloride      dextrose       Scheduled Medications    enoxaparin  40 mg SubCUTAneous Daily    miconazole   Topical BID    doxycycline  100 mg Oral 2 times per day    lactobacillus acidophilus  1 tablet Oral BID    topiramate  50 mg Oral QAM    And    topiramate  25 mg Oral Nightly    sodium chloride flush  5-40 mL IntraVENous 2 times per day    insulin lispro  0-6 Units SubCUTAneous TID WC    insulin lispro  0-3 Units SubCUTAneous Nightly    clindamycin (CLEOCIN) IV  900 mg IntraVENous Q6H    levothyroxine  50 mcg Oral Daily     PRN Meds: HYDROmorphone, oxyCODONE-acetaminophen **OR** oxyCODONE-acetaminophen, HYDROmorphone **OR** HYDROmorphone, sodium chloride flush, sodium chloride, ondansetron **OR** ondansetron, polyethylene glycol, acetaminophen **OR** acetaminophen, glucose, glucagon (rDNA), dextrose, dextrose bolus (hypoglycemia) **OR** dextrose bolus (hypoglycemia), morphine      Intake/Output Summary (Last 24 hours) at 3/18/2022 1344  Last data filed at 3/18/2022 0558  Gross per 24 hour   Intake 1265.11 ml   Output 300 ml   Net 965.11 ml       Exam:    /63   Pulse 66   Temp 97.2 °F (36.2 °C) (Oral)   Resp 18   Ht 5' (1.524 m)   Wt (!) 388 lb (176 kg)   SpO2 100%   BMI 75.78 kg/m²     General appearance: alert, cooperative  Lungs: clear to auscultation bilaterally  Heart: regular rate and rhythm and S1, S2 normal  Abdomen: soft, BS active  Extremities: left LE wound VAC present, chronic lymphedema of the LE bilaterally    Labs:   Recent Labs     03/16/22  0509 03/17/22  0502 03/18/22  0656   WBC 7.1 7.1 6.8   HGB 9.1* 9.4* 9.3*   HCT 28.9* 29.9* 30.0*    220 222     Recent Labs     03/16/22  0508 03/17/22  0502 03/18/22  0656    138 135   K 4.3 4.5 4.4   * 105 104 CO2 18* 19* 19*   BUN 25* 25* 24*   CREATININE 1.37* 1.22* 1.17*   CALCIUM 8.9 8.9 9.0   PHOS 3.0 4.0 3.7     No results for input(s): AST, ALT, BILIDIR, BILITOT, ALKPHOS in the last 72 hours. No results for input(s): INR in the last 72 hours. No results for input(s): Hebert Sleeper in the last 72 hours. Urinalysis:      Lab Results   Component Value Date    NITRU Negative 01/06/2019    WBCUA 3-5 01/06/2019    BACTERIA Negative 01/06/2019    RBCUA 0-2 01/06/2019    BLOODU Negative 01/06/2019    SPECGRAV 1.031 01/06/2019    GLUCOSEU Negative 01/06/2019       Radiology:  XR TIBIA FIBULA LEFT (2 VIEWS)   Final Result   DEGENERATIVE CHANGE LEFT KNEE, AND LEFT HEEL. NO FRACTURE. US DUP LOWER EXTREMITY LEFT YVETTE   Final Result   7.5 x 6.7 x 1.9 cm complex fluid collection involving subcutaneous tissues of left knee consistent with abscess or hematoma  . EXAMINATION: US SOFT TISSUE LIMITED AREA, US DUP LOWER EXTREMITY LEFT YVETTE      DATE AND TIME:3/14/2022 5:02 PM      CLINICAL HISTORY: Leg pain. Leg swelling. Thrombophlebitis, possible. Possible superficial and deep vein thrombosis, shortness of breath, dyspnea and edema   over left knee scab ? abscess             COMPARISON: None      TECHNIQUE: The lower extremity veins were evaluated with color doppler, gray scale imaging and spectral analysis while using compression and augmentation when possible. FINDINGS: Evaluation of the left lower extremity from the thigh to the knee shows normal phasic flow, normal augmentation of the Doppler signal, and normal compression of the deep veins. There is no sonographic evidence for acute deep venous thrombosis    from the left groin to the popliteal region.          IMPRESSION:NEGATIVE FOR ACUTE DVT OF THE LEFT LOWER EXTREMITY FROM THE  GROIN TO THE KNEE.        US SOFT TISSUE LIMITED AREA   Final Result   7.5 x 6.7 x 1.9 cm complex fluid collection involving subcutaneous tissues of left knee consistent with abscess or hematoma  . EXAMINATION: US SOFT TISSUE LIMITED AREA, US DUP LOWER EXTREMITY LEFT YVETTE      DATE AND TIME:3/14/2022 5:02 PM      CLINICAL HISTORY: Leg pain. Leg swelling. Thrombophlebitis, possible. Possible superficial and deep vein thrombosis, shortness of breath, dyspnea and edema   over left knee scab ? abscess             COMPARISON: None      TECHNIQUE: The lower extremity veins were evaluated with color doppler, gray scale imaging and spectral analysis while using compression and augmentation when possible. FINDINGS: Evaluation of the left lower extremity from the thigh to the knee shows normal phasic flow, normal augmentation of the Doppler signal, and normal compression of the deep veins. There is no sonographic evidence for acute deep venous thrombosis    from the left groin to the popliteal region. IMPRESSION:NEGATIVE FOR ACUTE DVT OF THE LEFT LOWER EXTREMITY FROM THE  GROIN TO THE KNEE.                 Assessment/Plan:    62 y.o. female with PMH of HTN, DM2, CKD3, morbid obesity, DEEP, chronic lymphedema of the LE who presented with:     Purulent cellulitis of LLE with abscess  - failed outpatient antibiotic therapy  - doppler u/s showed a complex fluid collection involving subcutaneous tissues of left knee concerning for abscess,   - blood cultures no growth  - on IV Clindamycin and PO Doxycycline,   - s/p I&D of LLE abscess with wound VAC placement   - wound culture positive for GNB  - management per ID,surgical, podiatry, wound care service      DM2 with hyperglycemia  - ISS, hypoglycemia protocol     CKD3  - monitor renal function    Microcytic anemia  - follow H/H    Folate deficiency  - started supplement    DEEP  - continue CPAP at HS       Disposition - SNF,  following      Electronically signed by Omar Davila MD on 3/18/2022 at 1:44 PM

## 2022-03-18 NOTE — PROGRESS NOTES
[facility] MED SURG TREATMENT NOTE     Date: 3/18/2022  Patient Name: Miguel Renae        MRN: [id number]  Account: [de-identified]   : 1960  (62 y.o.)  Room: [place] [address]    Chart Review:  Diagnosis:  The primary encounter diagnosis was Cellulitis and abscess of left lower extremity. A diagnosis of Septicemia ([address] was also pertinent to this visit. Restrictions:    Restrictions/Precautions: Fall Risk    Safety Devices: Safety Devices in place: Yes  Type of devices: All fall risk precautions in place     Subjective:  Patient states: \"I feel so much better\" pt stated following ADL. Pain:  Start of tx:  Pre Treatment Pain Screening  Pain at present: 6  Scale Used: Numeric Score  Intervention List: Patient able to continue with treatment,Patient declined any intervention  Comments / Details: Pt reports that she is not yet due for pain medication    End of tx:  Pain Assessment  Patient Currently in Pain: Yes  Pain Assessment: 0-10  Pain Level: 6  Pain Type: Chronic pain  Pain Location: Leg  Pain Orientation: Left  Pain Descriptors: Sore  Pain Frequency: Continuous  Clinical Progression: Not changed    Objective: Pt washed up while seated in bedside chair as follows. ADL:  Grooming: Setup (To complete hair care and wash face)  UE Bathing: Minimal assistance (Assistance for thoroughness with washing/drying folds)  LE Bathing: Dependent/Total  UE Dressing: Minimal assistance; Increased time to complete [facility] gown and button up sweater.  Pt doffed sweater with Min A to doff sweater past left hand)  LE Dressing: None (Pt declined)        Therapy key for assistance levels -   Independent = Pt. is able to perform task with no assistance but may require a device   Stand by assistance = Pt. does not perform task at an independent level but does not need physical assistance, requires verbal cues  Minimal, Moderate, Maximal Assistance = Pt. requires physical assistance (25%, 50%, 75%

## 2022-03-18 NOTE — PROGRESS NOTES
Physical Therapy Med Surg Daily Treatment Note  Facility/Department[facility]  [id number]       NAME: Shelly Clemons  : 1960 (62 y.o.)  MRN: [id number]  CODE STATUS: Full Code    Date of Service: 3/18/2022    Patient Diagnosis(es): Abscess [L02.91]  Septicemia ([address] [A41.9]  Cellulitis and abscess of left lower extremity [L03.116, L02.416]   Chief Complaint   Patient presents with    Leg Pain     Patient Active Problem List    Diagnosis Date Noted    Lymphedema due to venous disease 03/15/2022    Venous stasis ulcer of right calf limited to breakdown of skin without varicose veins ([address] 03/15/2022    Cellulitis and abscess of left lower extremity     Abscess 2022    Anxiety disorder, unspecified 2019    Chronic post-traumatic stress disorder (PTSD) 2019    Bipolar 1 disorder, depressed, severe ([address] 2019    Perirectal abscess     Asthma, chronic 2019    HTN (hypertension) 2019    Hyperlipidemia 2019    Stasis dermatitis 2019    Sepsis ([address] 2019    Cervical myelopathy ([address] 2017    Diabetes mellitus due to underlying condition with diabetic polyneuropathy ([address] 2017    Cervical vertebral fusion 2017    Diabetes mellitus type II, controlled ([address] 89/15/4571    Metabolic syndrome 5665        Past Medical History:   Diagnosis Date    Bipolar depression ([address]     Depression     Diabetes mellitus ([address]     Hypertension     PTSD (post-traumatic stress disorder)     Thyroid disease      Past Surgical History:   Procedure Laterality Date    BREAST LUMPECTOMY Right     CERVICAL LAMINECTOMY      C2-C6    INCISION AND DRAINAGE Left 2019    INCISION AND DRAINAGE LEFT PERIRECTAL  ABSCESS performed by Harvey Burns MD at [address] [place] [address] Left 3/16/2022    INCISION AND DRAINAGE OF LEFT LEG ABSCESS WITH WOUND [place] performed by Prema Booth MD at [address] [place] [address]  Restrictions/Precautions: Fall Risk    SUBJECTIVE   General  Chart Reviewed: Yes  Family / Caregiver Present: No  Subjective  Subjective: \"I am in good spirits today. \"    Pre-Session Pain Report  Pre Treatment Pain Screening  Pain at present: 7  Scale Used: Numeric Score  Intervention List: Patient able to continue with treatment  Pain Screening  Patient Currently in Pain: Yes       Post-Session Pain Report  Pain Assessment  Pain Assessment: 0-10  Pain Level: 7  Pain Type: Chronic pain  Pain Location: Leg  Pain Orientation: Left         OBJECTIVE        Bed mobility  Comment: DNT pt in recliner before and after tx. Transfers  Sit to Stand: Minimal Assistance  Stand to sit: Contact guard assistance  Comment: Min A needed d/t low height of recliner. good technique. Ambulation  Ambulation?: Yes  Ambulation 1  Surface: level tile  Device: Rolling Walker  Assistance: Contact guard assistance  Quality of Gait: WBOS with increased lateral excursion, flexed posture, antalgia, no LOB  Distance: 30 feet  Comments: several standing rest breaks needed. vc's for improved breathing and relaxation techniques. SpO2 93%  after ambulation, both return to normal quickly. Exercises  Comments: pt given written HEP for supine/seated therex, instructed on technique dosage and frequency of all exercises, pt verbalizes understanding and states she is already familiar with some of the exercises. Activity Tolerance  Activity Tolerance: Patient Tolerated treatment well          ASSESSMENT   Assessment: increased time and effort. pt with good motivation, follows cues well.      Discharge Recommendations:  Continue to assess pending progress,Patient would benefit from continued therapy after discharge    Goals  Long term goals  Long term goal 1: Pt to complete bed mobility with CGA  Long term goal 2: Pt to complete transfers with indep  Long term goal 3: Pt to ambulate 50-150ft with [employer] indep  Long term goal 4: Pt to complete HEP with indep    PLAN    Times per week: 3-6  Safety Devices  Type of devices: All fall risk precautions in place,Call light within reach     [facility] (6 CLICK) BASIC MOBILITY  AM-PAC Inpatient Mobility Raw Score : 14      Therapy Time   Individual   Time In 1401   Time Out 1424   Minutes 23      Gait: 12   Trsf: 8  Therex: 3       Lashanda Collazo PTA, 03/18/22 at 2:39 PM         Definitions for assistance levels  Independent = pt does not require any physical supervision or assistance from another person for activity completion. Device may be needed.   Stand by assistance = pt requires verbal cues or instructions from another person, close to but not touching, to perform the activity  Minimal assistance= pt performs 75% or more of the activity; assistance is required to complete the activity  Moderate assistance= pt performs 50% of the activity; assistance is required to complete the activity  Maximal assistance = pt performs 25% of the activity; assistance is required to complete the activity  Dependent = pt requires total physical assistance to accomplish the task

## 2022-03-18 NOTE — PROGRESS NOTES
Assessment documented. A + O x 4. Wound vac in place working properly. C/O 8/10 pain and medicated per MAR. Personal CPAP at HS. No other needs at this time. BLE dressings in place. Call light within reach.

## 2022-03-18 NOTE — PROGRESS NOTES
Shift assessment complete. VSS. Pt is Axox4. Meds given per mar. 2 percocet for 7/10 pain. Anxious for her wound vac change. Pt had gotten up to recliner. Lungs clear. abd rounded, bowel sounds present. Wound vac is CDI, hooked to suction. Call light within reach. Will continue to monitor.     Electronically signed by Yadira Kahn RN on 3/18/2022 at 9:47 AM

## 2022-03-19 LAB
ALBUMIN SERPL-MCNC: 3.1 G/DL (ref 3.5–4.6)
ANION GAP SERPL CALCULATED.3IONS-SCNC: 14 MEQ/L (ref 9–15)
BASOPHILS ABSOLUTE: 0 K/UL (ref 0–0.2)
BASOPHILS RELATIVE PERCENT: 0.5 %
BUN BLDV-MCNC: 22 MG/DL (ref 8–23)
C-REACTIVE PROTEIN, HIGH SENSITIVITY: 162 MG/L (ref 0–5)
CALCIUM SERPL-MCNC: 8.8 MG/DL (ref 8.5–9.9)
CHLORIDE BLD-SCNC: 103 MEQ/L (ref 95–107)
CO2: 20 MEQ/L (ref 20–31)
CREAT SERPL-MCNC: 1.36 MG/DL (ref 0.5–0.9)
EOSINOPHILS ABSOLUTE: 0.2 K/UL (ref 0–0.7)
EOSINOPHILS RELATIVE PERCENT: 2.3 %
GFR AFRICAN AMERICAN: 48
GFR NON-AFRICAN AMERICAN: 39.6
GLUCOSE BLD-MCNC: 111 MG/DL (ref 70–99)
GLUCOSE BLD-MCNC: 125 MG/DL (ref 70–99)
GLUCOSE BLD-MCNC: 127 MG/DL (ref 70–99)
GLUCOSE BLD-MCNC: 147 MG/DL (ref 70–99)
GLUCOSE BLD-MCNC: 164 MG/DL (ref 70–99)
HCT VFR BLD CALC: 30.5 % (ref 37–47)
HEMOGLOBIN: 9.5 G/DL (ref 12–16)
LYMPHOCYTES ABSOLUTE: 0.9 K/UL (ref 1–4.8)
LYMPHOCYTES RELATIVE PERCENT: 13.4 %
MAGNESIUM: 2.1 MG/DL (ref 1.7–2.4)
MCH RBC QN AUTO: 24.1 PG (ref 27–31.3)
MCHC RBC AUTO-ENTMCNC: 31 % (ref 33–37)
MCV RBC AUTO: 77.6 FL (ref 82–100)
MONOCYTES ABSOLUTE: 0.6 K/UL (ref 0.2–0.8)
MONOCYTES RELATIVE PERCENT: 8.3 %
NEUTROPHILS ABSOLUTE: 5.2 K/UL (ref 1.4–6.5)
NEUTROPHILS RELATIVE PERCENT: 75.5 %
PDW BLD-RTO: 18 % (ref 11.5–14.5)
PERFORMED ON: ABNORMAL
PHOSPHORUS: 3.6 MG/DL (ref 2.3–4.8)
PLATELET # BLD: 236 K/UL (ref 130–400)
POTASSIUM SERPL-SCNC: 4.2 MEQ/L (ref 3.4–4.9)
PROCALCITONIN: 0.47 NG/ML (ref 0–0.15)
RBC # BLD: 3.93 M/UL (ref 4.2–5.4)
SODIUM BLD-SCNC: 137 MEQ/L (ref 135–144)
WBC # BLD: 6.9 K/UL (ref 4.8–10.8)

## 2022-03-19 PROCEDURE — 1210000000 HC MED SURG R&B

## 2022-03-19 PROCEDURE — 6370000000 HC RX 637 (ALT 250 FOR IP): Performed by: COLON & RECTAL SURGERY

## 2022-03-19 PROCEDURE — 86141 C-REACTIVE PROTEIN HS: CPT

## 2022-03-19 PROCEDURE — 2580000003 HC RX 258: Performed by: COLON & RECTAL SURGERY

## 2022-03-19 PROCEDURE — 80069 RENAL FUNCTION PANEL: CPT

## 2022-03-19 PROCEDURE — 2500000003 HC RX 250 WO HCPCS: Performed by: COLON & RECTAL SURGERY

## 2022-03-19 PROCEDURE — 6360000002 HC RX W HCPCS: Performed by: INTERNAL MEDICINE

## 2022-03-19 PROCEDURE — 84145 PROCALCITONIN (PCT): CPT

## 2022-03-19 PROCEDURE — 83735 ASSAY OF MAGNESIUM: CPT

## 2022-03-19 PROCEDURE — 36415 COLL VENOUS BLD VENIPUNCTURE: CPT

## 2022-03-19 PROCEDURE — 85025 COMPLETE CBC W/AUTO DIFF WBC: CPT

## 2022-03-19 PROCEDURE — 99232 SBSQ HOSP IP/OBS MODERATE 35: CPT | Performed by: INTERNAL MEDICINE

## 2022-03-19 PROCEDURE — 6370000000 HC RX 637 (ALT 250 FOR IP): Performed by: INTERNAL MEDICINE

## 2022-03-19 RX ADMIN — LEVOTHYROXINE SODIUM 50 MCG: 0.05 TABLET ORAL at 06:03

## 2022-03-19 RX ADMIN — OXYCODONE AND ACETAMINOPHEN 2 TABLET: 5; 325 TABLET ORAL at 13:24

## 2022-03-19 RX ADMIN — LACTOBACILLUS TAB 1 TABLET: TAB at 08:37

## 2022-03-19 RX ADMIN — MICONAZOLE NITRATE: 2 POWDER TOPICAL at 22:00

## 2022-03-19 RX ADMIN — TOPIRAMATE 50 MG: 25 TABLET, FILM COATED ORAL at 08:37

## 2022-03-19 RX ADMIN — DOXYCYCLINE HYCLATE 100 MG: 100 CAPSULE ORAL at 21:50

## 2022-03-19 RX ADMIN — CLINDAMYCIN IN 5 PERCENT DEXTROSE 900 MG: 18 INJECTION, SOLUTION INTRAVENOUS at 06:05

## 2022-03-19 RX ADMIN — OXYCODONE AND ACETAMINOPHEN 2 TABLET: 5; 325 TABLET ORAL at 02:30

## 2022-03-19 RX ADMIN — TOPIRAMATE 25 MG: 25 TABLET, FILM COATED ORAL at 21:50

## 2022-03-19 RX ADMIN — LACTOBACILLUS TAB 1 TABLET: TAB at 21:50

## 2022-03-19 RX ADMIN — DOXYCYCLINE HYCLATE 100 MG: 100 CAPSULE ORAL at 08:37

## 2022-03-19 RX ADMIN — CLINDAMYCIN IN 5 PERCENT DEXTROSE 900 MG: 18 INJECTION, SOLUTION INTRAVENOUS at 13:31

## 2022-03-19 RX ADMIN — Medication 10 ML: at 08:31

## 2022-03-19 RX ADMIN — FOLIC ACID 1 MG: 1 TABLET ORAL at 08:38

## 2022-03-19 RX ADMIN — OXYCODONE AND ACETAMINOPHEN 2 TABLET: 5; 325 TABLET ORAL at 06:02

## 2022-03-19 RX ADMIN — Medication 10 ML: at 21:50

## 2022-03-19 RX ADMIN — CLINDAMYCIN IN 5 PERCENT DEXTROSE 900 MG: 18 INJECTION, SOLUTION INTRAVENOUS at 18:34

## 2022-03-19 RX ADMIN — OXYCODONE AND ACETAMINOPHEN 2 TABLET: 5; 325 TABLET ORAL at 22:37

## 2022-03-19 RX ADMIN — OXYCODONE AND ACETAMINOPHEN 2 TABLET: 5; 325 TABLET ORAL at 18:32

## 2022-03-19 RX ADMIN — ENOXAPARIN SODIUM 40 MG: 100 INJECTION SUBCUTANEOUS at 08:37

## 2022-03-19 ASSESSMENT — ENCOUNTER SYMPTOMS
GASTROINTESTINAL NEGATIVE: 1
COLOR CHANGE: 0
RESPIRATORY NEGATIVE: 1

## 2022-03-19 ASSESSMENT — PAIN SCALES - GENERAL
PAINLEVEL_OUTOF10: 9
PAINLEVEL_OUTOF10: 10
PAINLEVEL_OUTOF10: 7
PAINLEVEL_OUTOF10: 10
PAINLEVEL_OUTOF10: 7

## 2022-03-19 NOTE — PROGRESS NOTES
Pt medicated with 2 tabs of percocet for pain (8-10), pt sitting on chair and was assisted up to bedside commode. Pt very tearful about what she is going through, states she is in counseling which is helping. Pt was then assisted by pca into bed. Call light within reach and bed alarm on.

## 2022-03-19 NOTE — PROGRESS NOTES
Pt assessment and vitals complete and stable. Wound vac in place, patient tolerating well. Denies needs. Pt states 'I feel like the infection is traveling up my leg. Pt Left thigh is noted to be red, however unsure of extent r/t to this is the first day this nurse has taken care of the patient. Will make Dr. Reynaldo Sykes aware. Denies needs.

## 2022-03-19 NOTE — PROGRESS NOTES
Infectious Disease     Patient Name: Marinda Felty  Date: 3/19/2022  YOB: 1960  Medical Record Number: [id number]        Left lower extremity cellulitis  Left knee abscess      03/16/2022  POSTOPERATIVE DIAGNOSIS:  Left leg subcutaneous abscess. PROCEDURE:  1. Incision and drainage of subcutaneous abscess measuring  approximately 10 x 8 cm. 2.  Negative pressure wound VAC          US DUP LOWER EXTREMITY LEFT YVETTE [[id number]] Collected: 03/14/22 1753 Updated: 03/14/22 1802 Narrative:  EXAMINATION: US SOFT TISSUE LIMITED AREA, US DUP LOWER EXTREMITY LEFT YVETTE     COMPARISON:None     CLINICAL HISTORY:  over left knee scab ? abscess      FINDINGS:Targeted ultrasound scans of the left anterior knee. Significant subcutaneous edema with a 6.7 x 7.5 x 1.9 cm complex hypoechoic area lateral to the skin defect on the left knee that is compatible with abscess or complex hematoma. Impression:  7.5 x 6.7 x 1.9 cm complex fluid collection involving subcutaneous tissues of left knee consistent with abscess or hematoma  .    Impression:  DEGENERATIVE CHANGE LEFT KNEE, AND LEFT HEEL. NO FRACTURE           Review of Systems   Constitutional: Negative for chills, diaphoresis, fatigue and fever. Respiratory: Negative. Cardiovascular: Negative. Gastrointestinal: Negative. Musculoskeletal: Positive for arthralgias. Skin: Negative for color change. Physical Exam  Cardiovascular:      Heart sounds: Normal heart sounds. No murmur heard. Pulmonary:      Effort: No respiratory distress. Breath sounds: No wheezing or rales. Abdominal:      General: Abdomen is flat. Bowel sounds are normal. There is no distension. Palpations: Abdomen is soft. Tenderness: There is no abdominal tenderness. There is no guarding. Musculoskeletal:         General: Swelling and tenderness present.          Blood pressure (!) 122/55, pulse 78, temperature 98.2 °F (36.8 °C), temperature source Oral, resp. rate 18, height 5' (1.524 m), weight (!) 388 lb (176 kg), SpO2 98 %.       .   Lab Results   Component Value Date    WBC 6.9 03/19/2022    HGB 9.5 (L) 03/19/2022    HCT 30.5 (L) 03/19/2022    MCV 77.6 (L) 03/19/2022     03/19/2022     Lab Results   Component Value Date     03/19/2022    K 4.2 03/19/2022    K 3.8 01/07/2019     03/19/2022    CO2 20 03/19/2022    BUN 22 03/19/2022    CREATININE 1.36 03/19/2022    GLUCOSE 147 03/19/2022    CALCIUM 8.8 03/19/2022         3/16/22 1331   CULTURE WOUND Direct Exam:  MANY NEUTROPHILS   Direct Exam:  RARE GRAM NEGATIVE RODS           Culture, Anaerobic and Aerobic  Order: [id number]   Status: Preliminary result     Visible to patient: No (not released)     Next appt: None    Specimen Information: Leg; Body Fluid         0 Result Notes    Component 3/16/22 1331   CULTURE WOUND Direct Exam:  MANY NEUTROPHILS   Direct Exam: Mallie Or NEGATIVE RODS   Cult,Aerobe/Anaerobe:  GRAM NEGATIVE RODS   Cult,Aerobe/Anaerobe:  MODERATE GROWTH   Performed at [address]   [phone]                    PLAN:    Left lower extremity cellulitis  Left leg abscess    Lab result yesterday stated culture is growing moderate aerobic anaerobic growth today's culture report states there is no anaerobic growth    Other information is available    On clindamycin doxycycline

## 2022-03-19 NOTE — PROGRESS NOTES
Hospitalist Progress Note      PCP: Eren Summers    Date of Admission: 3/14/2022    Chief Complaint:  No acute events, afebrile, stable HD,     Medications:  Reviewed    Infusion Medications    sodium chloride      dextrose       Scheduled Medications    folic acid  1 mg Oral Daily    enoxaparin  40 mg SubCUTAneous Daily    miconazole   Topical BID    doxycycline  100 mg Oral 2 times per day    lactobacillus acidophilus  1 tablet Oral BID    topiramate  50 mg Oral QAM    And    topiramate  25 mg Oral Nightly    sodium chloride flush  5-40 mL IntraVENous 2 times per day    insulin lispro  0-6 Units SubCUTAneous TID WC    insulin lispro  0-3 Units SubCUTAneous Nightly    clindamycin (CLEOCIN) IV  900 mg IntraVENous Q6H    levothyroxine  50 mcg Oral Daily     PRN Meds: HYDROmorphone, oxyCODONE-acetaminophen **OR** oxyCODONE-acetaminophen, HYDROmorphone **OR** HYDROmorphone, sodium chloride flush, sodium chloride, ondansetron **OR** ondansetron, polyethylene glycol, acetaminophen **OR** acetaminophen, glucose, glucagon (rDNA), dextrose, dextrose bolus (hypoglycemia) **OR** dextrose bolus (hypoglycemia), morphine      Intake/Output Summary (Last 24 hours) at 3/19/2022 1341  Last data filed at 3/19/2022 0644  Gross per 24 hour   Intake 894.35 ml   Output --   Net 894.35 ml       Exam:    BP (!) 122/55   Pulse 78   Temp 98.2 °F (36.8 °C) (Oral)   Resp 18   Ht 5' (1.524 m)   Wt (!) 388 lb (176 kg)   SpO2 98%   BMI 75.78 kg/m²     General appearance: alert, cooperative  Lungs: clear to auscultation bilaterally  Heart: regular rate and rhythm and S1, S2 normal  Abdomen: soft, BS active  Extremities: left LE wound VAC present, chronic lymphedema of the LE bilaterally    Labs:   Recent Labs     03/17/22  0502 03/18/22  0656 03/19/22  1035   WBC 7.1 6.8 6.9   HGB 9.4* 9.3* 9.5*   HCT 29.9* 30.0* 30.5*    222 236     Recent Labs     03/17/22  0502 03/18/22  0656 03/19/22  1034    135 137   K 4.5 4.4 4.2    104 103   CO2 19* 19* 20   BUN 25* 24* 22   CREATININE 1.22* 1.17* 1.36*   CALCIUM 8.9 9.0 8.8   PHOS 4.0 3.7 3.6     No results for input(s): AST, ALT, BILIDIR, BILITOT, ALKPHOS in the last 72 hours. No results for input(s): INR in the last 72 hours. No results for input(s): Merline Laci in the last 72 hours. Urinalysis:      Lab Results   Component Value Date    NITRU Negative 01/06/2019    WBCUA 3-5 01/06/2019    BACTERIA Negative 01/06/2019    RBCUA 0-2 01/06/2019    BLOODU Negative 01/06/2019    SPECGRAV 1.031 01/06/2019    GLUCOSEU Negative 01/06/2019       Radiology:  XR TIBIA FIBULA LEFT (2 VIEWS)   Final Result   DEGENERATIVE CHANGE LEFT KNEE, AND LEFT HEEL. NO FRACTURE. US DUP LOWER EXTREMITY LEFT YVETTE   Final Result   7.5 x 6.7 x 1.9 cm complex fluid collection involving subcutaneous tissues of left knee consistent with abscess or hematoma  . EXAMINATION: US SOFT TISSUE LIMITED AREA, US DUP LOWER EXTREMITY LEFT YVETTE      DATE AND TIME:3/14/2022 5:02 PM      CLINICAL HISTORY: Leg pain. Leg swelling. Thrombophlebitis, possible. Possible superficial and deep vein thrombosis, shortness of breath, dyspnea and edema   over left knee scab ? abscess             COMPARISON: None      TECHNIQUE: The lower extremity veins were evaluated with color doppler, gray scale imaging and spectral analysis while using compression and augmentation when possible. FINDINGS: Evaluation of the left lower extremity from the thigh to the knee shows normal phasic flow, normal augmentation of the Doppler signal, and normal compression of the deep veins. There is no sonographic evidence for acute deep venous thrombosis    from the left groin to the popliteal region.          IMPRESSION:NEGATIVE FOR ACUTE DVT OF THE LEFT LOWER EXTREMITY FROM THE  GROIN TO THE KNEE.        US SOFT TISSUE LIMITED AREA   Final Result   7.5 x 6.7 x 1.9 cm complex fluid collection involving

## 2022-03-20 LAB
ALBUMIN SERPL-MCNC: 3 G/DL (ref 3.5–4.6)
ANION GAP SERPL CALCULATED.3IONS-SCNC: 13 MEQ/L (ref 9–15)
ANISOCYTOSIS: ABNORMAL
BASOPHILS ABSOLUTE: 0.1 K/UL (ref 0–0.2)
BASOPHILS RELATIVE PERCENT: 2 %
BLOOD CULTURE, ROUTINE: NORMAL
BUN BLDV-MCNC: 29 MG/DL (ref 8–23)
C-REACTIVE PROTEIN, HIGH SENSITIVITY: 164.5 MG/L (ref 0–5)
CALCIUM SERPL-MCNC: 8.9 MG/DL (ref 8.5–9.9)
CHLORIDE BLD-SCNC: 105 MEQ/L (ref 95–107)
CO2: 20 MEQ/L (ref 20–31)
CREAT SERPL-MCNC: 1.29 MG/DL (ref 0.5–0.9)
CULTURE, BLOOD 2: NORMAL
EOSINOPHILS ABSOLUTE: 0.1 K/UL (ref 0–0.7)
EOSINOPHILS RELATIVE PERCENT: 1 %
GFR AFRICAN AMERICAN: 51
GFR NON-AFRICAN AMERICAN: 42.1
GLUCOSE BLD-MCNC: 110 MG/DL (ref 70–99)
GLUCOSE BLD-MCNC: 111 MG/DL (ref 70–99)
GLUCOSE BLD-MCNC: 118 MG/DL (ref 70–99)
GLUCOSE BLD-MCNC: 126 MG/DL (ref 70–99)
GLUCOSE BLD-MCNC: 150 MG/DL (ref 70–99)
HCT VFR BLD CALC: 31.3 % (ref 37–47)
HEMOGLOBIN: 9.8 G/DL (ref 12–16)
LYMPHOCYTES ABSOLUTE: 1.6 K/UL (ref 1–4.8)
LYMPHOCYTES RELATIVE PERCENT: 22 %
MAGNESIUM: 2.2 MG/DL (ref 1.7–2.4)
MCH RBC QN AUTO: 24.3 PG (ref 27–31.3)
MCHC RBC AUTO-ENTMCNC: 31.4 % (ref 33–37)
MCV RBC AUTO: 77.4 FL (ref 82–100)
MICROCYTES: ABNORMAL
MONOCYTES ABSOLUTE: 0.8 K/UL (ref 0.2–0.8)
MONOCYTES RELATIVE PERCENT: 11.2 %
MYELOCYTE PERCENT: 1 %
NEUTROPHILS ABSOLUTE: 4.5 K/UL (ref 1.4–6.5)
NEUTROPHILS RELATIVE PERCENT: 63 %
NUCLEATED RED BLOOD CELLS: 1 /100 WBC
PDW BLD-RTO: 18.1 % (ref 11.5–14.5)
PERFORMED ON: ABNORMAL
PHOSPHORUS: 4.2 MG/DL (ref 2.3–4.8)
PLATELET # BLD: 264 K/UL (ref 130–400)
PLATELET SLIDE REVIEW: ADEQUATE
POTASSIUM SERPL-SCNC: 4.6 MEQ/L (ref 3.4–4.9)
PROCALCITONIN: 0.44 NG/ML (ref 0–0.15)
RBC # BLD: 4.05 M/UL (ref 4.2–5.4)
SODIUM BLD-SCNC: 138 MEQ/L (ref 135–144)
WBC # BLD: 7.1 K/UL (ref 4.8–10.8)

## 2022-03-20 PROCEDURE — 6370000000 HC RX 637 (ALT 250 FOR IP): Performed by: COLON & RECTAL SURGERY

## 2022-03-20 PROCEDURE — 1210000000 HC MED SURG R&B

## 2022-03-20 PROCEDURE — 2580000003 HC RX 258: Performed by: COLON & RECTAL SURGERY

## 2022-03-20 PROCEDURE — 36415 COLL VENOUS BLD VENIPUNCTURE: CPT

## 2022-03-20 PROCEDURE — 6370000000 HC RX 637 (ALT 250 FOR IP): Performed by: INTERNAL MEDICINE

## 2022-03-20 PROCEDURE — 85025 COMPLETE CBC W/AUTO DIFF WBC: CPT

## 2022-03-20 PROCEDURE — 99232 SBSQ HOSP IP/OBS MODERATE 35: CPT | Performed by: INTERNAL MEDICINE

## 2022-03-20 PROCEDURE — 2500000003 HC RX 250 WO HCPCS: Performed by: COLON & RECTAL SURGERY

## 2022-03-20 PROCEDURE — 80069 RENAL FUNCTION PANEL: CPT

## 2022-03-20 PROCEDURE — 6360000002 HC RX W HCPCS: Performed by: INTERNAL MEDICINE

## 2022-03-20 PROCEDURE — 2580000003 HC RX 258: Performed by: INTERNAL MEDICINE

## 2022-03-20 PROCEDURE — 83735 ASSAY OF MAGNESIUM: CPT

## 2022-03-20 PROCEDURE — 84145 PROCALCITONIN (PCT): CPT

## 2022-03-20 PROCEDURE — 86141 C-REACTIVE PROTEIN HS: CPT

## 2022-03-20 RX ADMIN — OXYCODONE AND ACETAMINOPHEN 1 TABLET: 5; 325 TABLET ORAL at 18:16

## 2022-03-20 RX ADMIN — Medication 10 ML: at 21:46

## 2022-03-20 RX ADMIN — OXYCODONE AND ACETAMINOPHEN 2 TABLET: 5; 325 TABLET ORAL at 06:00

## 2022-03-20 RX ADMIN — CLINDAMYCIN IN 5 PERCENT DEXTROSE 900 MG: 18 INJECTION, SOLUTION INTRAVENOUS at 12:44

## 2022-03-20 RX ADMIN — LEVOTHYROXINE SODIUM 50 MCG: 0.05 TABLET ORAL at 06:00

## 2022-03-20 RX ADMIN — LACTOBACILLUS TAB 1 TABLET: TAB at 21:45

## 2022-03-20 RX ADMIN — DOXYCYCLINE HYCLATE 100 MG: 100 CAPSULE ORAL at 09:02

## 2022-03-20 RX ADMIN — TOPIRAMATE 25 MG: 25 TABLET, FILM COATED ORAL at 21:45

## 2022-03-20 RX ADMIN — CLINDAMYCIN IN 5 PERCENT DEXTROSE 900 MG: 18 INJECTION, SOLUTION INTRAVENOUS at 06:03

## 2022-03-20 RX ADMIN — MICONAZOLE NITRATE: 2 POWDER TOPICAL at 21:50

## 2022-03-20 RX ADMIN — OXYCODONE AND ACETAMINOPHEN 2 TABLET: 5; 325 TABLET ORAL at 10:39

## 2022-03-20 RX ADMIN — MICONAZOLE NITRATE: 2 POWDER TOPICAL at 09:06

## 2022-03-20 RX ADMIN — MEROPENEM 1000 MG: 1 INJECTION, POWDER, FOR SOLUTION INTRAVENOUS at 18:14

## 2022-03-20 RX ADMIN — TOPIRAMATE 50 MG: 25 TABLET, FILM COATED ORAL at 09:01

## 2022-03-20 RX ADMIN — FOLIC ACID 1 MG: 1 TABLET ORAL at 09:02

## 2022-03-20 RX ADMIN — LACTOBACILLUS TAB 1 TABLET: TAB at 09:01

## 2022-03-20 RX ADMIN — CLINDAMYCIN IN 5 PERCENT DEXTROSE 900 MG: 18 INJECTION, SOLUTION INTRAVENOUS at 00:22

## 2022-03-20 RX ADMIN — OXYCODONE AND ACETAMINOPHEN 1 TABLET: 5; 325 TABLET ORAL at 22:56

## 2022-03-20 RX ADMIN — ENOXAPARIN SODIUM 40 MG: 100 INJECTION SUBCUTANEOUS at 09:01

## 2022-03-20 ASSESSMENT — ENCOUNTER SYMPTOMS
COLOR CHANGE: 0
RESPIRATORY NEGATIVE: 1
GASTROINTESTINAL NEGATIVE: 1

## 2022-03-20 ASSESSMENT — PAIN SCALES - GENERAL
PAINLEVEL_OUTOF10: 4
PAINLEVEL_OUTOF10: 6
PAINLEVEL_OUTOF10: 4
PAINLEVEL_OUTOF10: 6

## 2022-03-20 NOTE — FLOWSHEET NOTE
Assessment completed. VS WNL. PM meds given. Request percocet for 7/10 pain. +3 pitting edema at BLE, new drsg applied to left leg with DSD & maxorb, right leg drsg clean, dry, intact. Wound vac patent, clean, dry, intact. Denies further needs at the moment. Standard safety precaution in place. Will continue to monitor.

## 2022-03-20 NOTE — PROGRESS NOTES
Infectious Disease     Patient Name: Danial Evans  Date: 3/20/2022  YOB: 1960  Medical Record Number: [id number]        Left lower extremity cellulitis  Left knee abscess      03/16/2022  POSTOPERATIVE DIAGNOSIS:  Left leg subcutaneous abscess. PROCEDURE:  1. Incision and drainage of subcutaneous abscess measuring  approximately 10 x 8 cm. 2.  Negative pressure wound VAC          US DUP LOWER EXTREMITY LEFT YVETTE [[id number]] Collected: 03/14/22 1753 Updated: 03/14/22 1802 Narrative:  EXAMINATION: US SOFT TISSUE LIMITED AREA, US DUP LOWER EXTREMITY LEFT YVETTE     COMPARISON:None     CLINICAL HISTORY:  over left knee scab ? abscess      FINDINGS:Targeted ultrasound scans of the left anterior knee. Significant subcutaneous edema with a 6.7 x 7.5 x 1.9 cm complex hypoechoic area lateral to the skin defect on the left knee that is compatible with abscess or complex hematoma. Impression:  7.5 x 6.7 x 1.9 cm complex fluid collection involving subcutaneous tissues of left knee consistent with abscess or hematoma  .    Impression:  DEGENERATIVE CHANGE LEFT KNEE, AND LEFT HEEL. NO FRACTURE           Review of Systems   Constitutional: Negative for chills, diaphoresis, fatigue and fever. Respiratory: Negative. Cardiovascular: Negative. Gastrointestinal: Negative. Musculoskeletal: Positive for arthralgias. Skin: Negative for color change. Physical Exam  Cardiovascular:      Heart sounds: Normal heart sounds. No murmur heard. Pulmonary:      Effort: No respiratory distress. Breath sounds: No wheezing or rales. Abdominal:      General: Abdomen is flat. Bowel sounds are normal. There is no distension. Palpations: Abdomen is soft. Tenderness: There is no abdominal tenderness. There is no guarding. Musculoskeletal:         General: Swelling and tenderness present.          Blood pressure (!) 143/67, pulse 77, temperature 98.1 °F (36.7 °C), temperature source Oral, resp. rate 20, height 5' (1.524 m), weight (!) 388 lb (176 kg), SpO2 99 %. .   Lab Results   Component Value Date    WBC 7.1 03/20/2022    HGB 9.8 (L) 03/20/2022    HCT 31.3 (L) 03/20/2022    MCV 77.4 (L) 03/20/2022     03/20/2022     Lab Results   Component Value Date     03/20/2022    K 4.6 03/20/2022    K 3.8 01/07/2019     03/20/2022    CO2 20 03/20/2022    BUN 29 03/20/2022    CREATININE 1.29 03/20/2022    GLUCOSE 111 03/20/2022    CALCIUM 8.9 03/20/2022            3/16/22 1331    CULTURE WOUND  Abnormal   Direct Exam:  MANY NEUTROPHILS   Direct Exam:  RARE GRAM NEGATIVE RODS   Cult,Aerobe/Anaerobe:  No anaerobic organisms isolated at 4 days.    Performed at [address] [place] [address]   ([phone]   P      Organism Pasteurella multocida Abnormal  P    CULTURE WOUND MODERATE GROWTH P    Organism Pseudomonas aeruginosa Abnormal  P           PLAN:    Left lower extremity cellulitis  Left leg abscess      Cultures of abscess swelling showing Pseudomonas and Pasteurella    On clindamycin doxycycline which will not cover organisms    Patient has significant allergy history to penicillins    Switch to meropenem

## 2022-03-20 NOTE — PROGRESS NOTES
PATIENT HAS BEEN UP IN CHAIR MOST OF DAY VSS BLOOD SUGARS STABLE MED FOR PAIN X1 THIS SHIFT  APPETITE GOOD DR Guerline Garza ROUNDS AND IV ANTIBIOTICS CHANGED

## 2022-03-20 NOTE — PROGRESS NOTES
Hospitalist Progress Note      PCP: Yan Granados    Date of Admission: 3/14/2022    Chief Complaint:  No acute events, afebrile, stable HD,     Medications:  Reviewed    Infusion Medications    sodium chloride      dextrose       Scheduled Medications    folic acid  1 mg Oral Daily    enoxaparin  40 mg SubCUTAneous Daily    miconazole   Topical BID    doxycycline  100 mg Oral 2 times per day    lactobacillus acidophilus  1 tablet Oral BID    topiramate  50 mg Oral QAM    And    topiramate  25 mg Oral Nightly    sodium chloride flush  5-40 mL IntraVENous 2 times per day    insulin lispro  0-6 Units SubCUTAneous TID WC    insulin lispro  0-3 Units SubCUTAneous Nightly    clindamycin (CLEOCIN) IV  900 mg IntraVENous Q6H    levothyroxine  50 mcg Oral Daily     PRN Meds: HYDROmorphone, oxyCODONE-acetaminophen **OR** oxyCODONE-acetaminophen, HYDROmorphone **OR** HYDROmorphone, sodium chloride flush, sodium chloride, ondansetron **OR** ondansetron, polyethylene glycol, acetaminophen **OR** acetaminophen, glucose, glucagon (rDNA), dextrose, dextrose bolus (hypoglycemia) **OR** dextrose bolus (hypoglycemia), morphine      Intake/Output Summary (Last 24 hours) at 3/20/2022 1201  Last data filed at 3/20/2022 0603  Gross per 24 hour   Intake 480 ml   Output 750 ml   Net -270 ml       Exam:    BP (!) 143/67   Pulse 77   Temp 98.1 °F (36.7 °C) (Oral)   Resp 20   Ht 5' (1.524 m)   Wt (!) 388 lb (176 kg)   SpO2 99%   BMI 75.78 kg/m²     General appearance: alert, cooperative  Lungs: clear to auscultation bilaterally  Heart: regular rate and rhythm and S1, S2 normal  Abdomen: soft, BS active  Extremities: left LE wound VAC present, chronic lymphedema of the LE bilaterally    Labs:   Recent Labs     03/18/22  0656 03/19/22  1035 03/20/22  0609   WBC 6.8 6.9 7.1   HGB 9.3* 9.5* 9.8*   HCT 30.0* 30.5* 31.3*    236 264     Recent Labs     03/18/22  0656 03/19/22  1034 03/20/22  0609    137 138   K 4.4 4.2 4.6    103 105   CO2 19* 20 20   BUN 24* 22 29*   CREATININE 1.17* 1.36* 1.29*   CALCIUM 9.0 8.8 8.9   PHOS 3.7 3.6 4.2     No results for input(s): AST, ALT, BILIDIR, BILITOT, ALKPHOS in the last 72 hours. No results for input(s): INR in the last 72 hours. No results for input(s): [address] in the last 72 hours. Urinalysis:      Lab Results   Component Value Date    NITRU Negative 01/06/2019    WBCUA 3-5 01/06/2019    BACTERIA Negative 01/06/2019    RBCUA 0-2 01/06/2019    BLOODU Negative 01/06/2019    SPECGRAV 1.031 01/06/2019    GLUCOSEU Negative 01/06/2019       Radiology:  XR TIBIA FIBULA LEFT (2 VIEWS)   Final Result   DEGENERATIVE CHANGE LEFT KNEE, AND LEFT HEEL. NO FRACTURE. US DUP LOWER EXTREMITY LEFT YVETTE   Final Result   7.5 x 6.7 x 1.9 cm complex fluid collection involving subcutaneous tissues of left knee consistent with abscess or hematoma  . EXAMINATION: US SOFT TISSUE LIMITED AREA, US DUP LOWER EXTREMITY LEFT YVETTE      DATE AND TIME:3/14/2022 5:02 PM      CLINICAL HISTORY: Leg pain. Leg swelling. Thrombophlebitis, possible. Possible superficial and deep vein thrombosis, shortness of breath, dyspnea and edema   over left knee scab ? abscess             COMPARISON: None      TECHNIQUE: The lower extremity veins were evaluated with color doppler, gray scale imaging and spectral analysis while using compression and augmentation when possible. FINDINGS: Evaluation of the left lower extremity from the thigh to the knee shows normal phasic flow, normal augmentation of the Doppler signal, and normal compression of the deep veins. There is no sonographic evidence for acute deep venous thrombosis    from the left groin to the popliteal region.          IMPRESSION:NEGATIVE FOR ACUTE DVT OF THE LEFT LOWER EXTREMITY FROM THE  GROIN TO THE KNEE.        US SOFT TISSUE LIMITED AREA   Final Result   7.5 x 6.7 x 1.9 cm complex fluid collection involving subcutaneous tissues of left knee consistent with abscess or hematoma  . EXAMINATION: US SOFT TISSUE LIMITED AREA, US DUP LOWER EXTREMITY LEFT YVETTE      DATE AND TIME:3/14/2022 5:02 PM      CLINICAL HISTORY: Leg pain. Leg swelling. Thrombophlebitis, possible. Possible superficial and deep vein thrombosis, shortness of breath, dyspnea and edema   over left knee scab ? abscess             COMPARISON: None      TECHNIQUE: The lower extremity veins were evaluated with color doppler, gray scale imaging and spectral analysis while using compression and augmentation when possible. FINDINGS: Evaluation of the left lower extremity from the thigh to the knee shows normal phasic flow, normal augmentation of the Doppler signal, and normal compression of the deep veins. There is no sonographic evidence for acute deep venous thrombosis    from the left groin to the popliteal region. IMPRESSION:NEGATIVE FOR ACUTE DVT OF THE LEFT LOWER EXTREMITY FROM THE  GROIN TO THE KNEE.                 Assessment/Plan:    62 y.o. female with PMH of HTN, DM2, CKD3, morbid obesity, DEEP, chronic lymphedema of the LE who presented with:     Purulent cellulitis of LLE with abscess  - failed outpatient antibiotic therapy  - doppler u/s showed a complex fluid collection involving subcutaneous tissues of left knee concerning for abscess,   - blood cultures no growth  - on IV Clindamycin and PO Doxycycline,   - s/p I&D of LLE abscess with wound VAC placement   - wound culture positive for Pasteurella, Pseudomonas  - management per ID,surgical, podiatry, wound care service      DM2 with hyperglycemia  - ISS, hypoglycemia protocol     CKD3  - monitor renal function    Microcytic anemia  - iron studies showed inflammatory pattern  - follow H/H    Folate deficiency  - started supplement    DEEP  - continue CPAP at HS       Disposition - SNF when OK with ID      Electronically signed by Abad Chatterjee MD on 3/20/2022 at

## 2022-03-21 ENCOUNTER — APPOINTMENT (OUTPATIENT)
Dept: INTERVENTIONAL RADIOLOGY/VASCULAR | Age: 62
DRG: 364 | End: 2022-03-21
Payer: MEDICAID

## 2022-03-21 LAB
ALBUMIN SERPL-MCNC: 3.1 G/DL (ref 3.5–4.6)
ANION GAP SERPL CALCULATED.3IONS-SCNC: 13 MEQ/L (ref 9–15)
BASOPHILS ABSOLUTE: 0 K/UL (ref 0–0.2)
BASOPHILS RELATIVE PERCENT: 0.6 %
BUN BLDV-MCNC: 31 MG/DL (ref 8–23)
C-REACTIVE PROTEIN, HIGH SENSITIVITY: 120.8 MG/L (ref 0–5)
CALCIUM SERPL-MCNC: 9.2 MG/DL (ref 8.5–9.9)
CHLORIDE BLD-SCNC: 101 MEQ/L (ref 95–107)
CO2: 19 MEQ/L (ref 20–31)
CREAT SERPL-MCNC: 1.11 MG/DL (ref 0.5–0.9)
CULTURE WOUND: ABNORMAL
EOSINOPHILS ABSOLUTE: 0.1 K/UL (ref 0–0.7)
EOSINOPHILS RELATIVE PERCENT: 1.6 %
GFR AFRICAN AMERICAN: >60
GFR NON-AFRICAN AMERICAN: 50.1
GLUCOSE BLD-MCNC: 104 MG/DL (ref 70–99)
GLUCOSE BLD-MCNC: 123 MG/DL (ref 70–99)
GLUCOSE BLD-MCNC: 125 MG/DL (ref 70–99)
GLUCOSE BLD-MCNC: 148 MG/DL (ref 70–99)
HCT VFR BLD CALC: 29.3 % (ref 37–47)
HEMOGLOBIN: 9.4 G/DL (ref 12–16)
LYMPHOCYTES ABSOLUTE: 1.2 K/UL (ref 1–4.8)
LYMPHOCYTES RELATIVE PERCENT: 17.7 %
MAGNESIUM: 2.2 MG/DL (ref 1.7–2.4)
MCH RBC QN AUTO: 24.6 PG (ref 27–31.3)
MCHC RBC AUTO-ENTMCNC: 32.2 % (ref 33–37)
MCV RBC AUTO: 76.4 FL (ref 82–100)
MONOCYTES ABSOLUTE: 0.6 K/UL (ref 0.2–0.8)
MONOCYTES RELATIVE PERCENT: 8.4 %
NEUTROPHILS ABSOLUTE: 4.9 K/UL (ref 1.4–6.5)
NEUTROPHILS RELATIVE PERCENT: 71.7 %
ORGANISM: ABNORMAL
ORGANISM: ABNORMAL
PDW BLD-RTO: 18.5 % (ref 11.5–14.5)
PERFORMED ON: ABNORMAL
PHOSPHORUS: 3.9 MG/DL (ref 2.3–4.8)
PLATELET # BLD: 266 K/UL (ref 130–400)
POTASSIUM SERPL-SCNC: 4.3 MEQ/L (ref 3.4–4.9)
PROCALCITONIN: 0.39 NG/ML (ref 0–0.15)
RBC # BLD: 3.84 M/UL (ref 4.2–5.4)
SODIUM BLD-SCNC: 133 MEQ/L (ref 135–144)
WBC # BLD: 6.8 K/UL (ref 4.8–10.8)

## 2022-03-21 PROCEDURE — 6370000000 HC RX 637 (ALT 250 FOR IP): Performed by: COLON & RECTAL SURGERY

## 2022-03-21 PROCEDURE — 6360000002 HC RX W HCPCS: Performed by: INTERNAL MEDICINE

## 2022-03-21 PROCEDURE — 2580000003 HC RX 258: Performed by: INTERNAL MEDICINE

## 2022-03-21 PROCEDURE — 6370000000 HC RX 637 (ALT 250 FOR IP): Performed by: INTERNAL MEDICINE

## 2022-03-21 PROCEDURE — 80069 RENAL FUNCTION PANEL: CPT

## 2022-03-21 PROCEDURE — 36573 INSJ PICC RS&I 5 YR+: CPT

## 2022-03-21 PROCEDURE — 2500000003 HC RX 250 WO HCPCS: Performed by: INTERNAL MEDICINE

## 2022-03-21 PROCEDURE — 36415 COLL VENOUS BLD VENIPUNCTURE: CPT

## 2022-03-21 PROCEDURE — 99232 SBSQ HOSP IP/OBS MODERATE 35: CPT | Performed by: INTERNAL MEDICINE

## 2022-03-21 PROCEDURE — 97116 GAIT TRAINING THERAPY: CPT

## 2022-03-21 PROCEDURE — 2709999900 IR PICC WO SQ PORT/PUMP > 5 YEARS

## 2022-03-21 PROCEDURE — 83735 ASSAY OF MAGNESIUM: CPT

## 2022-03-21 PROCEDURE — 05HY33Z INSERTION OF INFUSION DEVICE INTO UPPER VEIN, PERCUTANEOUS APPROACH: ICD-10-PCS | Performed by: INTERNAL MEDICINE

## 2022-03-21 PROCEDURE — 84145 PROCALCITONIN (PCT): CPT

## 2022-03-21 PROCEDURE — 1210000000 HC MED SURG R&B

## 2022-03-21 PROCEDURE — 85025 COMPLETE CBC W/AUTO DIFF WBC: CPT

## 2022-03-21 PROCEDURE — 36573 INSJ PICC RS&I 5 YR+: CPT | Performed by: RADIOLOGY

## 2022-03-21 PROCEDURE — 2580000003 HC RX 258: Performed by: COLON & RECTAL SURGERY

## 2022-03-21 PROCEDURE — 97535 SELF CARE MNGMENT TRAINING: CPT

## 2022-03-21 PROCEDURE — 86141 C-REACTIVE PROTEIN HS: CPT

## 2022-03-21 RX ORDER — LIDOCAINE HYDROCHLORIDE 20 MG/ML
5 INJECTION, SOLUTION INFILTRATION; PERINEURAL ONCE
Status: COMPLETED | OUTPATIENT
Start: 2022-03-21 | End: 2022-03-21

## 2022-03-21 RX ORDER — SODIUM CHLORIDE 0.9 % (FLUSH) 0.9 %
5-40 SYRINGE (ML) INJECTION EVERY 12 HOURS SCHEDULED
Status: DISCONTINUED | OUTPATIENT
Start: 2022-03-21 | End: 2022-03-23 | Stop reason: HOSPADM

## 2022-03-21 RX ORDER — SODIUM CHLORIDE 0.9 % (FLUSH) 0.9 %
5-40 SYRINGE (ML) INJECTION PRN
Status: DISCONTINUED | OUTPATIENT
Start: 2022-03-21 | End: 2022-03-23 | Stop reason: HOSPADM

## 2022-03-21 RX ORDER — SODIUM CHLORIDE 9 MG/ML
250 INJECTION, SOLUTION INTRAVENOUS ONCE
Status: COMPLETED | OUTPATIENT
Start: 2022-03-21 | End: 2022-03-21

## 2022-03-21 RX ORDER — SODIUM CHLORIDE 9 MG/ML
25 INJECTION, SOLUTION INTRAVENOUS PRN
Status: DISCONTINUED | OUTPATIENT
Start: 2022-03-21 | End: 2022-03-23 | Stop reason: HOSPADM

## 2022-03-21 RX ADMIN — LACTOBACILLUS TAB 1 TABLET: TAB at 21:23

## 2022-03-21 RX ADMIN — LEVOTHYROXINE SODIUM 50 MCG: 0.05 TABLET ORAL at 05:23

## 2022-03-21 RX ADMIN — LACTOBACILLUS TAB 1 TABLET: TAB at 09:18

## 2022-03-21 RX ADMIN — OXYCODONE AND ACETAMINOPHEN 1 TABLET: 5; 325 TABLET ORAL at 06:50

## 2022-03-21 RX ADMIN — TOPIRAMATE 25 MG: 25 TABLET, FILM COATED ORAL at 21:23

## 2022-03-21 RX ADMIN — Medication 650 MG: at 21:23

## 2022-03-21 RX ADMIN — LIDOCAINE HYDROCHLORIDE 5 ML: 20 INJECTION, SOLUTION INFILTRATION; PERINEURAL at 17:50

## 2022-03-21 RX ADMIN — TOPIRAMATE 50 MG: 25 TABLET, FILM COATED ORAL at 09:18

## 2022-03-21 RX ADMIN — Medication 10 ML: at 10:10

## 2022-03-21 RX ADMIN — MICONAZOLE NITRATE: 2 POWDER TOPICAL at 10:10

## 2022-03-21 RX ADMIN — SODIUM CHLORIDE 250 ML: 9 INJECTION, SOLUTION INTRAVENOUS at 13:45

## 2022-03-21 RX ADMIN — MEROPENEM 1000 MG: 1 INJECTION, POWDER, FOR SOLUTION INTRAVENOUS at 02:06

## 2022-03-21 RX ADMIN — Medication 10 ML: at 21:24

## 2022-03-21 RX ADMIN — MICONAZOLE NITRATE: 2 POWDER TOPICAL at 21:29

## 2022-03-21 RX ADMIN — MEROPENEM 1000 MG: 1 INJECTION, POWDER, FOR SOLUTION INTRAVENOUS at 18:43

## 2022-03-21 RX ADMIN — MEROPENEM 1000 MG: 1 INJECTION, POWDER, FOR SOLUTION INTRAVENOUS at 09:30

## 2022-03-21 RX ADMIN — OXYCODONE AND ACETAMINOPHEN 1 TABLET: 5; 325 TABLET ORAL at 02:45

## 2022-03-21 RX ADMIN — FOLIC ACID 1 MG: 1 TABLET ORAL at 09:18

## 2022-03-21 ASSESSMENT — PAIN SCALES - GENERAL
PAINLEVEL_OUTOF10: 4
PAINLEVEL_OUTOF10: 0
PAINLEVEL_OUTOF10: 0
PAINLEVEL_OUTOF10: 4
PAINLEVEL_OUTOF10: 0
PAINLEVEL_OUTOF10: 4
PAINLEVEL_OUTOF10: 3

## 2022-03-21 ASSESSMENT — PAIN DESCRIPTION - LOCATION: LOCATION: LEG

## 2022-03-21 ASSESSMENT — PAIN DESCRIPTION - PAIN TYPE: TYPE: CHRONIC PAIN

## 2022-03-21 ASSESSMENT — ENCOUNTER SYMPTOMS
RESPIRATORY NEGATIVE: 1
GASTROINTESTINAL NEGATIVE: 1
COLOR CHANGE: 0

## 2022-03-21 NOTE — PROGRESS NOTES
Comprehensive Nutrition Assessment    Type and Reason for Visit:  Initial,RD Nutrition Re-Screen/LOS,Wound    Nutrition Recommendations/Plan:   · Continue General diet    · Start wound healing ONS BID    Nutrition Assessment:  Pt at nutrition risk due to increased nutrient needs for wound healing. Pt stated her appetite had been poor x 3 weeks pta but has improved significantly since admission. Will start a wound healing ONS BID    Malnutrition Assessment:  Malnutrition Status:  No malnutrition    Context:  Chronic Illness     Findings of the 6 clinical characteristics of malnutrition:  Energy Intake:  No significant decrease in energy intake  Weight Loss:  No significant weight loss     Body Fat Loss:  No significant body fat loss     Muscle Mass Loss:  No significant muscle mass loss    Fluid Accumulation:  Unable to assess     Strength:  Not Performed    Estimated Daily Nutrient Needs:  Energy (kcal):  8429-7101 (kg x 8-10); Weight Used for Energy Requirements:  Admission (176 kg)     Protein (g):  90 gm (kg IBW x 2.0); Weight Used for Protein Requirements:  Ideal (45.4 kg)        Fluid (ml/day):  ~1700 ml; Method Used for Fluid Requirements:  1 ml/kcal      Nutrition Related Findings:  PMH of HTN, DM2, CKD3, morbid obesity, chronic lymphedema of the LE, labs: Na+ 133, glucose (118-150), HbA1C (3/15): 6.4, meds reviewed, 3+ BLE edema noted, Bm 3/21, s/p I&D with wound vac placement      Wounds:  Venous Stasis,Moisture Associate Skin Damage,Wound Vac       Current Nutrition Therapies:    ADULT DIET;  Regular (%)    Anthropometric Measures:  · Height: 5' (152.4 cm)  · Current Body Weight: 388 lb (176 kg) (admission,  pt currently up in chair)   · Admission Body Weight: 388 lb (176 kg) (stated)    · Usual Body Weight: 380 lb (172.4 kg) (stated (11/19/21))     · Ideal Body Weight: 100 lbs; % Ideal Body Weight  > 100%   · BMI: 75.8  · BMI Categories: Obese Class 3 (BMI 40.0 or greater)       Nutrition Diagnosis:   · Increased nutrient needs related to increase demand for energy/nutrients as evidenced by wounds    Nutrition Interventions:   Food and/or Nutrient Delivery:  Start Oral Nutrition Supplement,Continue Current Diet (Continue General diet  Start wound healing ONS BID)  Nutrition Education/Counseling:  No recommendation at this time   Coordination of Nutrition Care:  Continue to monitor while inpatient    Goals:  intake > 50% meals/> 75% ONS, improved wound status, glucose < 160       Nutrition Monitoring and Evaluation:   Food/Nutrient Intake Outcomes:  Food and Nutrient Intake,Supplement Intake  Physical Signs/Symptoms Outcomes:  Biochemical Data,Weight,Fluid Status or Edema     Discharge Planning:    No discharge needs at this time     Electronically signed by Kiki Bautista RD, LD on 3/21/22 at 2:58 PM EDT

## 2022-03-21 NOTE — PROGRESS NOTES
Infectious Disease     Patient Name: Nilda Parker  Date: 3/21/2022  YOB: 1960  Medical Record Number: [id number]        Left lower extremity cellulitis  Left knee abscess      03/16/2022  POSTOPERATIVE DIAGNOSIS:  Left leg subcutaneous abscess. PROCEDURE:  1. Incision and drainage of subcutaneous abscess measuring  approximately 10 x 8 cm. 2.  Negative pressure wound VAC                Review of Systems   Constitutional: Negative for chills, diaphoresis, fatigue and fever. Respiratory: Negative. Cardiovascular: Negative. Gastrointestinal: Negative. Musculoskeletal: Positive for arthralgias. Skin: Negative for color change. Physical Exam  Cardiovascular:      Heart sounds: Normal heart sounds. No murmur heard. Pulmonary:      Effort: No respiratory distress. Breath sounds: No wheezing or rales. Abdominal:      General: Abdomen is flat. Bowel sounds are normal. There is no distension. Palpations: Abdomen is soft. Tenderness: There is no abdominal tenderness. There is no guarding. Musculoskeletal:         General: Swelling and tenderness present. Blood pressure (!) 151/73, pulse 77, temperature 98.1 °F (36.7 °C), temperature source Oral, resp. rate 20, height 5' (1.524 m), weight (!) 388 lb (176 kg), SpO2 96 %. .   Lab Results   Component Value Date    WBC 6.8 03/21/2022    HGB 9.4 (L) 03/21/2022    HCT 29.3 (L) 03/21/2022    MCV 76.4 (L) 03/21/2022     03/21/2022     Lab Results   Component Value Date     03/21/2022    K 4.3 03/21/2022    K 3.8 01/07/2019     03/21/2022    CO2 19 03/21/2022    BUN 31 03/21/2022    CREATININE 1.11 03/21/2022    GLUCOSE 104 03/21/2022    CALCIUM 9.2 03/21/2022            3/16/22 1331    CULTURE WOUND  Abnormal   Direct Exam:  MANY NEUTROPHILS   Direct Exam:  RARE GRAM NEGATIVE RODS   Cult,Aerobe/Anaerobe:  No anaerobic organisms isolated at 4 days.    Performed at [address] [facility] [address]   ([phone]   P      Organism Pasteurella multocida Abnormal  P    CULTURE WOUND MODERATE GROWTH P    Organism Pseudomonas aeruginosa Abnormal  P           PLAN:    Left lower extremity cellulitis  Left leg abscess      Cultures of abscess swelling showing Pseudomonas and Pasteurella     meropenem

## 2022-03-21 NOTE — PROGRESS NOTES
Physical Therapy Med Surg Daily Treatment Note  Fac[facility]/Department: [facility]  [facility]       NAME: Trevon Khalil  : 1960 (62 y.o.)  MRN: [id number]  CODE STATUS: Full Code    Date of Service: 3/21/2022    Patient Diagnosis(es): Abscess [L02.91]  Septicemia ([address] [A41.9]  Cellulitis and abscess of left lower extremity [L03.116, L02.416]   Chief Complaint   Patient presents with    Leg Pain     Patient Active Problem List    Diagnosis Date Noted    Lymphedema due to venous disease 03/15/2022    Venous stasis ulcer of right calf limited to breakdown of skin without varicose veins ([address] 03/15/2022    Cellulitis and abscess of left lower extremity     Abscess 2022    Anxiety disorder, unspecified 2019    Chronic post-traumatic stress disorder (PTSD) 2019    Bipolar 1 disorder, depressed, severe ([address] 2019    Perirectal abscess     Asthma, chronic 2019    HTN (hypertension) 2019    Hyperlipidemia 2019    Stasis dermatitis 2019    Sepsis ([address] 2019    Cervical myelopathy ([address] 2017    Diabetes mellitus due to underlying condition with diabetic polyneuropathy ([address] 2017    Cervical vertebral fusion 2017    Diabetes mellitus type II, controlled ([address]     Metabolic syndrome 3218        Past Medical History:   Diagnosis Date    Bipolar depression ([address]     Depression     Diabetes mellitus ([address]     Hypertension     PTSD (post-traumatic stress disorder)     Thyroid disease      Past Surgical History:   Procedure Laterality Date    BREAST LUMPECTOMY Right     CERVICAL LAMINECTOMY      C2-C6    INCISION AND DRAINAGE Left 2019    INCISION AND DRAINAGE LEFT PERIRECTAL  ABSCESS performed by Prema Booth MD at [address] [place] [address] Left 3/16/2022    INCISION AND DRAINAGE OF LEFT LEG ABSCESS WITH WOUND [place] performed by Rosina Collazo MD at [address] [place] [address]  Restrictions/Precautions: Fall Risk    SUBJECTIVE   General  Chart Reviewed: Yes  Family / Caregiver Present: No  Subjective  Subjective: \"i need to walk, i haven't in days\"  General Comment  Comments: getting dressing changed by podiatry but agreeable to tx    Pre-Session Pain Report     Pain Screening  Patient Currently in Pain: No       Post-Session Pain Report  Pain Assessment  Pain Assessment: 0-10  Pain Level: 0         OBJECTIVE   Orientation  Overall Orientation Status: Within Functional Limits         Transfers  Sit to Stand: Stand by assistance;Contact guard assistance  Stand to sit: Stand by assistance;Contact guard assistance  Comment: pt very safe with transitions. pt able to transfer to and from commode and back to recliner    Ambulation  Ambulation?: Yes  Ambulation 1  Surface: level tile  Device: Rolling Walker  Assistance: Contact guard assistance  Quality of Gait: flexed trunk, decreased stance on right, slow pace, wbos with external rotation bilaterally  Gait Deviations: Slow Leticia; Increased PAULA  Distance: 15 feet x 2 with turns and one standing rest break leaning on ww. Stairs/Curb  Stairs?: No         Neuromuscular Education  Neuromuscular Comments: standing balance with ww. standing weight shifts. rotating head side to side with standing. ASSESSMENT pt very tearful at times about her condition but is motivated to keep moving daily. She was able to tolerate short distance with ambulation and standing activity. Pt cried often about the loss of her mobility but expressed she was grateful she has her job where she can work from home.         Discharge Recommendations:  Continue to assess pending progress,Patient would benefit from continued therapy after discharge    Goals  Long term goals  Long term goal 1: Pt to complete bed mobility with CGA  Long term goal 2: Pt to complete transfers with indep  Long term goal 3: Pt to ambulate 50-150ft with [address] indep  Long term goal 4: Pt to complete HEP with indep    PLAN    Times per week: 3-6        AMPAC (6 CLICK) BASIC MOBILITY  AM-PAC Inpatient Mobility Raw Score : 15     Therapy Time   Individual   Time In  1045   Time Out 1125   Minutes 40      15 minutes neuro  10 minutes gt  15 minutes transfer activity       [facility] DEYANIRAOBI WILKERSON PTA, 03/21/22 at 11:30 AM         Definitions for assistance levels  Independent = pt does not require any physical supervision or assistance from another person for activity completion. Device may be needed.   Stand by assistance = pt requires verbal cues or instructions from another person, close to but not touching, to perform the activity  Minimal assistance= pt performs 75% or more of the activity; assistance is required to complete the activity  Moderate assistance= pt performs 50% of the activity; assistance is required to complete the activity  Maximal assistance = pt performs 25% of the activity; assistance is required to complete the activity  Dependent = pt requires total physical assistance to accomplish the task

## 2022-03-21 NOTE — PLAN OF CARE
Nutrition Problem #1: Increased nutrient needs  Intervention: Food and/or Nutrient Delivery: Start Oral Nutrition Supplement,Continue Current Diet (Continue General diet  Start wound healing ONS BID)  Nutritional Goals: intake > 50% meals/> 75% ONS, improved wound status, glucose < 160

## 2022-03-21 NOTE — PROGRESS NOTES
[facility] MEDICINE AND SURGERY  Progress note      Patient Name: Avril Brower  MRN: [id number]    FOLLOW UP REGARDING:  Bilateral leg wounds    ASSESSMENT:    Patient is a 62year old female that presents to the hospital with complains of left knee pain and bilateral leg wounds. Leg wounds are consistent with venous stasis ulcerations in the setting of severe edema and do not appear clinically infected at this time. PLAN AND RECOMMENDATIONS:  - Exam and evaluation  - Leg wounds dressed with Maxsorb, DSD, ACE. Plan to change dressing daily. Nursing instructions in for dressing changes. ACE should be applied w/ mild compression from foot to below knee. - Keep BLE elevated above the level of the heart  - Antibiotics per primary / ID recommendations  - Podiatry will continue to follow while patient is in house    INTERVAL HPI and PERTINENT ROS:   -Pt resting comfortable in bed. NAD.   - Vitals stable. - Labs stable. WBC 6.8. Na 133. BUN 31. Cr 1.11  - No constitutional symptoms or pain   - Pt happy with the current progress of edema and wounds. OBJECTIVE:  BP (!) 151/73   Pulse 77   Temp 98.1 °F (36.7 °C) (Oral)   Resp 20   Ht 5' (1.524 m)   Wt (!) 388 lb (176 kg)   SpO2 96%   BMI 75.78 kg/m²   Patient is alert and oriented x 3 in NAD.       Vascular:  Non-Palpable Dorsalis Pedis and Non-Palpable Posterior Tibial Pulses B/L due to edema  Audible biphasic DP and PT pulses bilaterally  Capillary Fill time < 3 seconds to B/L digits  Skin temperature warm to warm tibial tuberosity to the digits B/L  Hair growth absent to digits  Severe 3+ pitting edema to bilateral lower extremities with ruborous changes consistent with stasis dermatitis     Neurological:   Epicritic sensation intact B/L  Protective sensation via monofilament testing intact B/L     Musculoskeletal/Orthopaedic:   4/5 muscle strength Dorsiflexion, Plantarflexion, Inversion, Eversion B/L  Global tenderness to bilateral feet and legs Dermatological:   Skin appears well hydrated and supple with good temperature, texture, turgor. No hyperkeratosis noted. Interspaces 1-4 are clear and without debris B/L. Nails 1-5 appear thickened and elongated bilaterally  Open lesions as noted below:     Ulceration #1:   Location: Left lateral leg  Measurements: 8 cm x 3 cm x 0.1 cm  Base: Mixed Granular/Fibrotic  Borders: Viable   Exudate: Moderate serous  Comments: No erythema extending beyond borders with no evidence of ascending lymphangitis. Wound does not undermine, does not probe to bone. Ulceration #2:   Location: Right lateral leg  Measurements: 7.5 cm x 3 cm x 0.1 cm  Base: Mixed Granular/Fibrotic  Borders: Viable   Exudate: Moderate serous  Comments: No erythema extending beyond borders with no evidence of ascending lymphangitis. Wound does not undermine, does not probe to bone. LABS:   Lab Results   Component Value Date    WBC 6.8 03/21/2022    HGB 9.4 (L) 03/21/2022    HCT 29.3 (L) 03/21/2022    MCV 76.4 (L) 03/21/2022     03/21/2022     Lab Results   Component Value Date     03/21/2022    K 4.3 03/21/2022    K 3.8 01/07/2019     03/21/2022    CO2 19 03/21/2022    BUN 31 03/21/2022    CREATININE 1.11 03/21/2022    GLUCOSE 104 03/21/2022    CALCIUM 9.2 03/21/2022      Lab Results   Component Value Date    LABALBU 3.1 (L) 03/21/2022     Lab Results   Component Value Date    SEDRATE 94 (H) 03/14/2022     Lab Results   Component Value Date    .9 (H) 03/14/2022     Lab Results   Component Value Date    LABA1C 6.4 (H) 03/15/2022     No results found for: EAG    MICROBIOLOGY:   Blood cultures x2 3/14 NGTD     IMAGING:   XR Tibia Fibula Left (3/14/2022)  Decrease in joint space medial compartment, lateral compartment, patellofemoral compartment. Ankle mortise intact. Posterior and plantar spurring of calcaneus. Osteopenia. No fracture. No bone lesion.     Patient's case to be discussed with staff , all recommendations are not final until staff cosigns the note.      Luda Nunes DPM, PGY1  Please first page Podiatry On Call, [phone]  March 21, 2022  9:48 AM

## 2022-03-21 NOTE — CARE COORDINATION
This LSW met with patient at bedside this afternoon. Patient had questions re: FMLA paperwork. I was able to answer questions. Patient had no other concerns. Discharge plan discussed with patient. Patient will d/c to [facility] when medically  Ready. William Medley at [facility] aware.   Electronically signed by ELZA Metzger, LSW on 3/21/22 at 12:23 PM EDT

## 2022-03-21 NOTE — FLOWSHEET NOTE
Assessment completed. VS WNL. PM meds given. Request percocet for 6/10 pain. Wound vac patent at -125 continuous suction. New drsg applied to BLE with maxorb, DSD; +3 edema noted. Standard safety precaution in place. Will continue to monitor.

## 2022-03-21 NOTE — PROGRESS NOTES
Patient pleasant and cooperative this am, denies pain or discomfort at this time. Patient continues on antibiotic therapy with no adverse reactions and remains afebrile. Assessment complete. Wound vac remains in place on lle and functioning appropriately. Patient's bun and creat slightly elevated from previous draw, this nurse informed Dr. Andrei Urias. Will continue to monitor. [facility] with Dr. Gerri Patton today to clarify if this patient will be needing picc prior to discharge, MD states yes to put order in.

## 2022-03-21 NOTE — PROGRESS NOTES
Department of Internal Medicine  General Internal Medicine  Attending Progress Note      SUBJECTIVE:  Pt seen and examine. Resting in bed.  PICC ordered as patient will need IV abx per ID    OBJECTIVE      Medications    Current Facility-Administered Medications: lidocaine 2 % injection 5 mL, 5 mL, IntraDERmal, Once  sodium chloride flush 0.9 % injection 5-40 mL, 5-40 mL, IntraVENous, 2 times per day  sodium chloride flush 0.9 % injection 5-40 mL, 5-40 mL, IntraVENous, PRN  0.9 % sodium chloride infusion, 25 mL, IntraVENous, PRN  meropenem (MERREM) 1,000 mg in sodium chloride 0.9 % 100 mL IVPB (mini-bag), 1,000 mg, IntraVENous, Z6K  folic acid (FOLVITE) tablet 1 mg, 1 mg, Oral, Daily  enoxaparin (LOVENOX) injection 40 mg, 40 mg, SubCUTAneous, Daily  HYDROmorphone (DILAUDID) injection 0.5 mg, 0.5 mg, IntraVENous, Q10 Min PRN  HYDROmorphone (DILAUDID) injection 0.5 mg, 0.5 mg, IntraVENous, Q3H PRN **OR** HYDROmorphone (DILAUDID) injection 1 mg, 1 mg, IntraVENous, Q3H PRN  miconazole (MICOTIN) 2 % powder, , Topical, BID  lactobacillus acidophilus (FLORANEX) 1 tablet, 1 tablet, Oral, BID  topiramate (TOPAMAX) tablet 50 mg, 50 mg, Oral, QAM **AND** topiramate (TOPAMAX) tablet 25 mg, 25 mg, Oral, Nightly  sodium chloride flush 0.9 % injection 5-40 mL, 5-40 mL, IntraVENous, 2 times per day  sodium chloride flush 0.9 % injection 5-40 mL, 5-40 mL, IntraVENous, PRN  0.9 % sodium chloride infusion, 25 mL, IntraVENous, PRN  ondansetron (ZOFRAN-ODT) disintegrating tablet 4 mg, 4 mg, Oral, Q8H PRN **OR** ondansetron (ZOFRAN) injection 4 mg, 4 mg, IntraVENous, Q6H PRN  polyethylene glycol (GLYCOLAX) packet 17 g, 17 g, Oral, Daily PRN  acetaminophen (TYLENOL) tablet 650 mg, 650 mg, Oral, Q6H PRN **OR** acetaminophen (TYLENOL) suppository 650 mg, 650 mg, Rectal, Q6H PRN  glucose (GLUTOSE) 40 % oral gel 15 g, 15 g, Oral, PRN  glucagon (rDNA) injection 1 mg, 1 mg, IntraMUSCular, PRN  dextrose 5 % solution, 100 mL/hr, IntraVENous, PRN  insulin lispro (HUMALOG) injection vial 0-6 Units, 0-6 Units, SubCUTAneous, TID WC  insulin lispro (HUMALOG) injection vial 0-3 Units, 0-3 Units, SubCUTAneous, Nightly  dextrose bolus (hypoglycemia) 10% 125 mL, 125 mL, IntraVENous, PRN **OR** dextrose bolus (hypoglycemia) 10% 250 mL, 250 mL, IntraVENous, PRN  morphine (PF) injection 2 mg, 2 mg, IntraVENous, Q3H PRN  levothyroxine (SYNTHROID) tablet 50 mcg, 50 mcg, Oral, Daily  Physical    VITALS:  BP (!) 151/73   Pulse 77   Temp 98.1 °F (36.7 °C) (Oral)   Resp 20   Ht 5' (1.524 m)   Wt (!) 388 lb (176 kg)   SpO2 96%   BMI 75.78 kg/m²   General appearance: alert, cooperative  Lungs: clear to auscultation bilaterally  Heart: regular rate and rhythm and S1, S2 normal  Abdomen: soft, BS active  Extremities: left LE wound VAC present, chronic lymphedema of the LE bilaterally  Data    CBC:   Lab Results   Component Value Date    WBC 6.8 03/21/2022    RBC 3.84 03/21/2022    HGB 9.4 03/21/2022    HCT 29.3 03/21/2022    MCV 76.4 03/21/2022    MCH 24.6 03/21/2022    MCHC 32.2 03/21/2022    RDW 18.5 03/21/2022     03/21/2022     CMP:    Lab Results   Component Value Date     03/21/2022    K 4.3 03/21/2022    K 3.8 01/07/2019     03/21/2022    CO2 19 03/21/2022    BUN 31 03/21/2022    CREATININE 1.11 03/21/2022    GFRAA >60.0 03/21/2022    LABGLOM 50.1 03/21/2022    GLUCOSE 104 03/21/2022    PROT 7.1 03/14/2022    LABALBU 3.1 03/21/2022    CALCIUM 9.2 03/21/2022    BILITOT 0.9 03/14/2022    ALKPHOS 144 03/14/2022    AST 16 03/14/2022    ALT 17 03/14/2022       ASSESSMENT AND PLAN      # Purulent cellulitis of LLE with abscess  - failed outpatient antibiotic therapy  - doppler u/s showed a complex fluid collection involving subcutaneous tissues of left knee concerning for abscess,   - blood cultures no growth  - on IV Clindamycin and PO Doxycycline- changed to meropenem by ID due to culture results  - PICC ordered   - s/p I&D of LLE abscess with wound VAC placement   - wound culture positive for Pasteurella, Pseudomonas  - management per ID,surgical, podiatry, wound care service      # DM2 with hyperglycemia  - ISS, hypoglycemia protocol     # CKD3  - monitor renal function     # Microcytic anemia  - iron studies showed inflammatory pattern  - follow H/H     # Folate deficiency  - started supplement     # DEEP  - continue CPAP at HS     Disposition: PICC ordered. Will need IV abx per ID recs.  [facility] at discharge      Barber Dominguez DO  Internal Medicine   Hospitalist    >35 minutes in total care time

## 2022-03-22 VITALS
WEIGHT: 293 LBS | BODY MASS INDEX: 57.52 KG/M2 | DIASTOLIC BLOOD PRESSURE: 60 MMHG | HEIGHT: 60 IN | TEMPERATURE: 99 F | SYSTOLIC BLOOD PRESSURE: 143 MMHG | RESPIRATION RATE: 18 BRPM | OXYGEN SATURATION: 100 % | HEART RATE: 76 BPM

## 2022-03-22 LAB
GLUCOSE BLD-MCNC: 111 MG/DL (ref 70–99)
GLUCOSE BLD-MCNC: 113 MG/DL (ref 70–99)
GLUCOSE BLD-MCNC: 124 MG/DL (ref 70–99)
GLUCOSE BLD-MCNC: 131 MG/DL (ref 70–99)
PERFORMED ON: ABNORMAL
SARS-COV-2, NAAT: NOT DETECTED

## 2022-03-22 PROCEDURE — 6360000002 HC RX W HCPCS: Performed by: INTERNAL MEDICINE

## 2022-03-22 PROCEDURE — 6370000000 HC RX 637 (ALT 250 FOR IP): Performed by: COLON & RECTAL SURGERY

## 2022-03-22 PROCEDURE — 2580000003 HC RX 258: Performed by: INTERNAL MEDICINE

## 2022-03-22 PROCEDURE — 99211 OFF/OP EST MAY X REQ PHY/QHP: CPT

## 2022-03-22 PROCEDURE — 36591 DRAW BLOOD OFF VENOUS DEVICE: CPT

## 2022-03-22 PROCEDURE — 1210000000 HC MED SURG R&B

## 2022-03-22 PROCEDURE — 87635 SARS-COV-2 COVID-19 AMP PRB: CPT

## 2022-03-22 PROCEDURE — 6360000002 HC RX W HCPCS: Performed by: COLON & RECTAL SURGERY

## 2022-03-22 PROCEDURE — 36592 COLLECT BLOOD FROM PICC: CPT

## 2022-03-22 PROCEDURE — 99232 SBSQ HOSP IP/OBS MODERATE 35: CPT | Performed by: INTERNAL MEDICINE

## 2022-03-22 PROCEDURE — 2580000003 HC RX 258: Performed by: COLON & RECTAL SURGERY

## 2022-03-22 PROCEDURE — 6370000000 HC RX 637 (ALT 250 FOR IP): Performed by: INTERNAL MEDICINE

## 2022-03-22 RX ORDER — OXYCODONE HYDROCHLORIDE AND ACETAMINOPHEN 5; 325 MG/1; MG/1
1 TABLET ORAL EVERY 6 HOURS PRN
Qty: 12 TABLET | Refills: 0 | Status: SHIPPED | OUTPATIENT
Start: 2022-03-22 | End: 2022-03-25

## 2022-03-22 RX ORDER — FOLIC ACID 1 MG/1
1 TABLET ORAL DAILY
Qty: 30 TABLET | Refills: 3 | Status: SHIPPED | OUTPATIENT
Start: 2022-03-23

## 2022-03-22 RX ADMIN — MEROPENEM 1000 MG: 1 INJECTION, POWDER, FOR SOLUTION INTRAVENOUS at 01:34

## 2022-03-22 RX ADMIN — MICONAZOLE NITRATE: 2 POWDER TOPICAL at 08:43

## 2022-03-22 RX ADMIN — HYDROMORPHONE HYDROCHLORIDE 0.5 MG: 1 INJECTION, SOLUTION INTRAMUSCULAR; INTRAVENOUS; SUBCUTANEOUS at 14:24

## 2022-03-22 RX ADMIN — SODIUM CHLORIDE, PRESERVATIVE FREE 10 ML: 5 INJECTION INTRAVENOUS at 01:35

## 2022-03-22 RX ADMIN — MORPHINE SULFATE 2 MG: 2 INJECTION, SOLUTION INTRAMUSCULAR; INTRAVENOUS at 20:07

## 2022-03-22 RX ADMIN — MICONAZOLE NITRATE: 2 POWDER TOPICAL at 20:06

## 2022-03-22 RX ADMIN — TOPIRAMATE 50 MG: 25 TABLET, FILM COATED ORAL at 08:31

## 2022-03-22 RX ADMIN — MORPHINE SULFATE 2 MG: 2 INJECTION, SOLUTION INTRAMUSCULAR; INTRAVENOUS at 08:48

## 2022-03-22 RX ADMIN — MEROPENEM 1000 MG: 1 INJECTION, POWDER, FOR SOLUTION INTRAVENOUS at 08:32

## 2022-03-22 RX ADMIN — Medication 10 ML: at 09:00

## 2022-03-22 RX ADMIN — LEVOTHYROXINE SODIUM 50 MCG: 0.05 TABLET ORAL at 06:10

## 2022-03-22 RX ADMIN — ENOXAPARIN SODIUM 40 MG: 100 INJECTION SUBCUTANEOUS at 08:32

## 2022-03-22 RX ADMIN — LACTOBACILLUS TAB 1 TABLET: TAB at 08:31

## 2022-03-22 RX ADMIN — LACTOBACILLUS TAB 1 TABLET: TAB at 20:06

## 2022-03-22 RX ADMIN — MEROPENEM 1000 MG: 1 INJECTION, POWDER, FOR SOLUTION INTRAVENOUS at 18:05

## 2022-03-22 RX ADMIN — Medication 650 MG: at 08:31

## 2022-03-22 RX ADMIN — FOLIC ACID 1 MG: 1 TABLET ORAL at 08:31

## 2022-03-22 RX ADMIN — Medication 20 ML: at 09:00

## 2022-03-22 RX ADMIN — TOPIRAMATE 25 MG: 25 TABLET, FILM COATED ORAL at 20:06

## 2022-03-22 RX ADMIN — Medication 650 MG: at 22:24

## 2022-03-22 RX ADMIN — Medication 10 ML: at 20:06

## 2022-03-22 ASSESSMENT — PAIN SCALES - GENERAL
PAINLEVEL_OUTOF10: 7
PAINLEVEL_OUTOF10: 6
PAINLEVEL_OUTOF10: 0
PAINLEVEL_OUTOF10: 3
PAINLEVEL_OUTOF10: 8
PAINLEVEL_OUTOF10: 5
PAINLEVEL_OUTOF10: 7

## 2022-03-22 ASSESSMENT — ENCOUNTER SYMPTOMS
GASTROINTESTINAL NEGATIVE: 1
COLOR CHANGE: 0
RESPIRATORY NEGATIVE: 1

## 2022-03-22 NOTE — PROGRESS NOTES
Infectious Disease     Patient Name: Andrea Phan  Date: 3/22/2022  YOB: 1960  Medical Record Number: [id number]        Left lower extremity cellulitis  Left knee abscess      03/16/2022  POSTOPERATIVE DIAGNOSIS:  Left leg subcutaneous abscess. PROCEDURE:  1. Incision and drainage of subcutaneous abscess measuring  approximately 10 x 8 cm. 2.  Negative pressure wound VAC                Review of Systems   Constitutional: Negative. Respiratory: Negative. Cardiovascular: Negative. Gastrointestinal: Negative. Musculoskeletal: Negative for arthralgias. Skin: Negative for color change. Physical Exam  Cardiovascular:      Heart sounds: Normal heart sounds. No murmur heard. Pulmonary:      Effort: No respiratory distress. Breath sounds: No wheezing or rales. Abdominal:      General: Abdomen is flat. Bowel sounds are normal. There is no distension. Palpations: Abdomen is soft. Tenderness: There is no abdominal tenderness. There is no guarding. Musculoskeletal:         General: Swelling and tenderness present. Blood pressure (!) 135/57, pulse 70, temperature 97.2 °F (36.2 °C), temperature source Oral, resp. rate 16, height 5' (1.524 m), weight (!) 388 lb (176 kg), SpO2 98 %. .   Lab Results   Component Value Date    WBC 6.8 03/21/2022    HGB 9.4 (L) 03/21/2022    HCT 29.3 (L) 03/21/2022    MCV 76.4 (L) 03/21/2022     03/21/2022     Lab Results   Component Value Date     03/21/2022    K 4.3 03/21/2022    K 3.8 01/07/2019     03/21/2022    CO2 19 03/21/2022    BUN 31 03/21/2022    CREATININE 1.11 03/21/2022    GLUCOSE 104 03/21/2022    CALCIUM 9.2 03/21/2022            3/16/22 1331    CULTURE WOUND  Abnormal   Direct Exam:  MANY NEUTROPHILS   Direct Exam:  RARE GRAM NEGATIVE RODS   Cult,Aerobe/Anaerobe:  No anaerobic organisms isolated at 4 days.    Performed at [facility], [address] [place] [address]   [phone]      Organism Pasteurella multocida Abnormal  P    CULTURE WOUND MODERATE GROWTH P    Organism Pseudomonas aeruginosa Abnormal  P           PLAN:    Left lower extremity cellulitis  Left leg abscess      Cultures of abscess swelling showing Pseudomonas and Pasteurella    meropenem plan for 2 weeks

## 2022-03-22 NOTE — PROGRESS NOTES
Wound Ostomy Continence Nursing    Patient laying in bed at this time, rest, wound VAC to the left knee is clean and intact, functioning without issue. Spoke with the care team, patient anticipating discharge to SNF today. When discharged, wound VAC dressing to be removed and saline moist to dry dressing placed until the NPWT is resumed at the SNF. Patient verbalizes understanding, no other needs at this time.     Electronically signed by SHELLIE Bellamy, RN, Gianni Schumacher on 3/22/2022 at 10:20 AM

## 2022-03-22 NOTE — PROGRESS NOTES
Assessment documented. Tylenol given for LLE 4/10 pain with (+) results. Wound vac dressing is c/d/i. No other needs at this time. Call light within reach.

## 2022-03-22 NOTE — CARE COORDINATION
Spoke with john at [facility], may accept patient today if dc'd. Will need final abx orders. Has picc.

## 2022-03-22 NOTE — DISCHARGE INSTR - COC
Continuity of Care Form    Patient Name: Haydee Mittal   :  1960  MRN:  [id number]    Admit date:  3/14/2022  Discharge date:  3/22/22    Code Status Order: Full Code   Advance Directives:   Advance Care Flowsheet Documentation       Date/Time Healthcare Directive Type of Healthcare Directive Copy in [address] Agent's Name Healthcare Agent's Phone Number    [phone] No, patient does not have an advance directive for healthcare treatment -- -- -- -- --            Admitting Physician:  Kwame Elena MD  PCP: Nadya Morales    Discharging Nurse: ADRIANO Andres [facility] Unit/Room#: Y547/K846-57  Discharging Unit Phone Number:  [phone]    Emergency Contact:   Extended Emergency Contact Information  Primary Emergency Contact: Geetha Ashby [address] Phone: [phone]  Mobile Phone: [phone]  Relation: Other    Past Surgical History:  Past Surgical History:   Procedure Laterality Date    BREAST LUMPECTOMY Right     CERVICAL LAMINECTOMY      C2-C6    INCISION AND DRAINAGE Left 2019    INCISION AND DRAINAGE LEFT PERIRECTAL  ABSCESS performed by Royce Oliveira MD at [place] [address] Left 3/16/2022    INCISION AND DRAINAGE OF LEFT LEG ABSCESS WITH WOUND VAC PLACEMENT performed by Royce Oliveira MD at [facility]       Immunization History:   Immunization History   Administered Date(s) Administered    COVID-19, [employer] top, DO NOT Dilute, Chandana-Sucrose, 12+ yrs, PF, 30 mcg/0.3 mL dose 2022       Active Problems:  Patient Active Problem List   Diagnosis Code    Asthma, chronic J45.909    Cervical myelopathy ([facility] [address] G95.9    Cervical vertebral fusion M43.22    Diabetes mellitus due to underlying condition with diabetic polyneuropathy ([address] E08.42    Diabetes mellitus type II, controlled ([address] E11.9    HTN (hypertension) I10    Hyperlipidemia D08.4    Metabolic syndrome E88.81    Stasis dermatitis I87.2    Sepsis ([address] A41.9    Perirectal abscess K61.1    Bipolar 1 disorder, depressed, severe (HCC) F31.4    Anxiety disorder, unspecified F41.9    Chronic post-traumatic stress disorder (PTSD) F43.12    Abscess L02.91    Cellulitis and abscess of left lower extremity L03.116, L02.416    Lymphedema due to venous disease I89.0, I99.9    Venous stasis ulcer of right calf limited to breakdown of skin without varicose veins (HCC) I87.2, L97.211       Isolation/Infection:   Isolation            No Isolation          Patient Infection Status       None to display            Nurse Assessment:  Last Vital Signs: BP (!) 135/57   Pulse 70   Temp 97.2 °F (36.2 °C) (Oral)   Resp 16   Ht 5' (1.524 m)   Wt (!) 388 lb (176 kg)   SpO2 98%   BMI 75.78 kg/m²     Last documented pain score (0-10 scale): Pain Level: 7  Last Weight:   Wt Readings from Last 1 Encounters:   03/14/22 (!) 388 lb (176 kg)     Mental Status:  oriented and alert    IV Access:  - PICC - site  R Basilic, insertion date: 3/21/22    Nursing Mobility/ADLs:  Walking   Assisted  Transfer  Assisted  Bathing  Assisted  Dressing  Assisted  Toileting  Assisted  Feeding  Independent  Med Admin  Assisted  Med Delivery   whole    Wound Care Documentation and Therapy:  Negative Pressure Wound Therapy Knee Anterior; Left (Active)   $ Standard NPWT <=50 sq cm PER TX $ Yes 03/18/22 1034   Wound Type Surgical 03/18/22 1034   Unit Type KCI 03/21/22 2139   Dressing Type Black foam 03/20/22 0908   Number of pieces used 1 03/18/22 1034   Cycle Continuous 03/21/22 2139   Target Pressure (mmHg) 125 03/21/22 2139   Canister changed? No 03/18/22 2200   Dressing Status Clean;Dry; Intact 03/21/22 2139   Dressing Changed Changed/New 03/18/22 1034   Drainage Amount Scant 03/18/22 1034   Drainage Description Serosanguinous 03/21/22 2139   Dressing Change Due 03/21/22 03/18/22 2200   Wound Assessment Red;Granulation tissue 03/18/22 1034   Odor None 03/20/22 2145   Number of days: 5       Wound 03/14/22 Pelvis Left open area skin fold: Moisture Associated Skin Damage (MASD) (Active)   Wound Etiology Other 03/15/22 1005   Dressing Status New dressing applied 03/20/22 2145   Dressing/Treatment Pharmaceutical agent (see MAR) 03/21/22 2139   Wound Assessment Pink/red 03/21/22 2139   Drainage Amount None 03/21/22 2139   Odor None 03/21/22 0930   Number of days: 7       Wound 03/14/22 Leg Right;Distal;Lateral (Active)   Wound Etiology Venous 03/20/22 2145   Dressing Status Clean;Dry; Intact 03/21/22 2139   Wound Cleansed Cleansed with saline 03/18/22 1910   Dressing/Treatment Other (comment) 03/20/22 2145   Wound Length (cm) 14 cm 03/15/22 1005   Wound Width (cm) 4 cm 03/15/22 1005   Wound Depth (cm) 0.1 cm 03/15/22 1005   Wound Surface Area (cm^2) 56 cm^2 03/15/22 1005   Wound Volume (cm^3) 5.6 cm^3 03/15/22 1005   Wound Assessment Pink/red 03/20/22 2145   Drainage Amount Small 03/20/22 2145   Drainage Description Serous 03/20/22 2145   Odor None 03/20/22 2145   Lina-wound Assessment Maceration 03/15/22 1005   Margins Undefined edges 03/15/22 1005   Wound Thickness Description not for Pressure Injury Full thickness 03/15/22 1005   Number of days: 8       Wound 03/14/22 Leg Left;Distal;Lateral (Active)   Wound Etiology Venous 03/20/22 2145   Dressing Status Clean;Dry; Intact 03/21/22 2139   Wound Cleansed Cleansed with saline 03/20/22 2145   Dressing/Treatment Dry dressing 03/16/22 2205   Wound Length (cm) 9 cm 03/15/22 1005   Wound Width (cm) 3 cm 03/15/22 1005   Wound Depth (cm) 0.1 cm 03/15/22 1005   Wound Surface Area (cm^2) 27 cm^2 03/15/22 1005   Wound Volume (cm^3) 2.7 cm^3 03/15/22 1005   Wound Assessment Pink/red 03/20/22 2145   Drainage Amount Scant 03/20/22 2145   Drainage Description Serous 03/20/22 2145   Odor None 03/20/22 2145   Lina-wound Assessment Maceration 03/15/22 1005   Margins Undefined edges 03/15/22 1005   Wound Thickness Description not for Pressure Injury Full thickness 03/15/22 1005   Number of days: 8        Elimination:  Continence: Bowel: Yes  Bladder: Yes  Urinary Catheter: None   Colostomy/Ileostomy/Ileal Conduit: No       Date of Last BM: 3/21/22    Intake/Output Summary (Last 24 hours) at 3/22/2022 0945  Last data filed at 3/22/2022 0927  Gross per 24 hour   Intake 859.36 ml   Output 400 ml   Net 459.36 ml     I/O last 3 completed shifts: In: 1569.7 [P.O.:1020; I.V.:40; IV Piggyback:509.7]  Out: 450 [Urine:450]    Safety Concerns:     None    Impairments/Disabilities:      None    Nutrition Therapy:  Current Nutrition Therapy:   - Oral Diet:  General and Carb Control 4 carbs/meal (1800kcals/day)    Routes of Feeding: Oral  Liquids: Thin Liquids  Daily Fluid Restriction: no  Last Modified Barium Swallow with Video (Video Swallowing Test): not done    Treatments at the Time of Hospital Discharge:   Respiratory Treatments:   Oxygen Therapy:  is not on home oxygen therapy. Ventilator:    - No ventilator support    Rehab Therapies: Physical Therapy and Occupational Therapy  Weight Bearing Status/Restrictions: No weight bearing restrictions  Other Medical Equipment (for information only, NOT a DME order):  {EQUIPMENT:[id number]}  Other Treatments:       Patient's personal belongings (please select all that are sent with patient):  {CHP DME Belongings:[id number]}    RN SIGNATURE:  {Esignature:[id number]}    CASE MANAGEMENT/SOCIAL WORK SECTION    Inpatient Status Date: ***    Readmission Risk Assessment Score:  Readmission Risk              Risk of Unplanned Readmission:  19           Discharging to Facility/ Agency   Name: Lawanda Hawthorne  Address:  [facility]:[phone]  Fax:      / signature: Electronically signed by Ag Good RN on 3/22/22 at 9:46 AM EDT    PHYSICIAN SECTION    Prognosis: Fair    Condition at Discharge: Stable    Rehab Potential (if transferring to Rehab):  Fair    Recommended Labs or Other Treatments After Discharge:     Physician Certification: I certify the above information and transfer of Dulce Jacome  is necessary for the continuing treatment of the diagnosis listed and that she requires [facility] for greater 30 days.      Update Admission H&P: No change in H&P    PHYSICIAN SIGNATURE:  Electronically signed by Darrel Carranza DO on 3/22/22 at 12:23 PM EDT

## 2022-03-22 NOTE — PROGRESS NOTES
Patient pleasant and cooperative this am, complained of generalized pain and left leg pain that is controlled with prn pain medication. Patient continues on antibiotic therapy with no adverse reactions noted at this time and remains afebrile. Podiatry in today and changed ayleen leg dressings. Assessment complete. Will continue to monitor. 65 Dr. Donaldo Ventura in ok with patient discharging, wrote order for patient to continue meropenem for 14 days, order placed with patient's discharge paper work. This nurse spoke with Dr. ARREOLA \[facility]\"" [employer] to add prn pain medication to patient's discharge orders, prn percocet added.

## 2022-03-22 NOTE — PROGRESS NOTES
[facility] MEDICINE AND SURGERY  Progress note      Patient Name: Andrea Phan  MRN: [id number]    FOLLOW UP REGARDING:  Bilateral leg wounds    ASSESSMENT:    Patient is a 62year old female that presents to the hospital with complains of left knee pain and bilateral leg wounds. Leg wounds are consistent with venous stasis ulcerations in the setting of severe edema and do not appear clinically infected at this time. PLAN AND RECOMMENDATIONS:  - Exam and evaluation with staff  - Leg wounds dressed with Maxsorb, DSD, paper tape. Plan to change dressing daily. Nursing instructions in for every other day dressing changes. Podiatry will do the other changes. - Keep BLE elevated above the level of the heart  - Antibiotics per primary / ID recommendations  - Podiatry will continue to follow while patient is in house    INTERVAL HPI and PERTINENT ROS:   - Pt resting comfortable in bed. NAD.   - Vitals stable. - Labs stable. - No constitutional symptoms or pain   - Pt happy with the current progress of edema and wounds. OBJECTIVE:  BP (!) 135/57   Pulse 70   Temp 97.2 °F (36.2 °C) (Oral)   Resp 16   Ht 5' (1.524 m)   Wt (!) 388 lb (176 kg)   SpO2 98%   BMI 75.78 kg/m²   Patient is alert and oriented x 3 in NAD.       Vascular:  Non-Palpable Dorsalis Pedis and Non-Palpable Posterior Tibial Pulses B/L due to edema  Audible biphasic DP and PT pulses bilaterally  Capillary Fill time < 3 seconds to B/L digits  Skin temperature warm to warm tibial tuberosity to the digits B/L  Hair growth absent to digits  Severe 3+ pitting edema to bilateral lower extremities with ruborous changes consistent with stasis dermatitis     Neurological:   Epicritic sensation intact B/L  Protective sensation via monofilament testing intact B/L     Musculoskeletal/Orthopaedic:   4/5 muscle strength Dorsiflexion, Plantarflexion, Inversion, Eversion B/L  Global tenderness to bilateral feet and legs     Dermatological:   Skin appears well hydrated and supple with good temperature, texture, turgor. No hyperkeratosis noted. Interspaces 1-4 are clear and without debris B/L. Nails 1-5 appear thickened and elongated bilaterally  Open lesions as noted below:     Ulceration #1:   Location: Left lateral leg  Measurements: 8 cm x 3 cm x 0.1 cm  Base: Mixed Granular/Fibrotic  Borders: Viable   Exudate: Moderate serous  Comments: No erythema extending beyond borders with no evidence of ascending lymphangitis. Wound does not undermine, does not probe to bone. Ulceration #2:   Location: Right lateral leg  Measurements: 7.5 cm x 3 cm x 0.1 cm  Base: Mixed Granular/Fibrotic  Borders: Viable   Exudate: Moderate serous  Comments: No erythema extending beyond borders with no evidence of ascending lymphangitis. Wound does not undermine, does not probe to bone. LABS:   Lab Results   Component Value Date    WBC 6.8 03/21/2022    HGB 9.4 (L) 03/21/2022    HCT 29.3 (L) 03/21/2022    MCV 76.4 (L) 03/21/2022     03/21/2022     Lab Results   Component Value Date     03/21/2022    K 4.3 03/21/2022    K 3.8 01/07/2019     03/21/2022    CO2 19 03/21/2022    BUN 31 03/21/2022    CREATININE 1.11 03/21/2022    GLUCOSE 104 03/21/2022    CALCIUM 9.2 03/21/2022      Lab Results   Component Value Date    LABALBU 3.1 (L) 03/21/2022     Lab Results   Component Value Date    SEDRATE 94 (H) 03/14/2022     Lab Results   Component Value Date    .9 (H) 03/14/2022     Lab Results   Component Value Date    LABA1C 6.4 (H) 03/15/2022     No results found for: EAG    MICROBIOLOGY:   Blood cultures x2 3/14 NGTD     IMAGING:   XR Tibia Fibula Left (3/14/2022)  Decrease in joint space medial compartment, lateral compartment, patellofemoral compartment. Ankle mortise intact. Posterior and plantar spurring of calcaneus. Osteopenia. No fracture. No bone lesion.     Patient seen with staff    Tanika Richardson, EVARISTO, PGY1  Please first page Podiatry On Call, [phone]  March 22, 2022  8:26 AM

## 2022-03-22 NOTE — DISCHARGE SUMMARY
Physician Discharge Summary     Patient ID:  Urban Joyner  [id number]  42 y.o.  1960    Admit date: 3/14/2022    Discharge date : 03/22/22     Admitting Physician: Rodrigo Weaver MD     Discharge Physician: Ani Curry DO     Admission Diagnoses: Abscess [L02.91]  Septicemia [facility]) [A41.9]  Cellulitis and abscess of left lower extremity [L03.116, L02.416]    Discharge Diagnoses: LLE cellulitis, abscess    Admission Condition: fair    Discharged Condition: fair    Hospital Course: 62 y.o. female with PMH of HTN, DM2, CKD3, morbid obesity, DEEP, chronic lymphedema of the [facility] presented with purulent cellulitis of LLE with abscess. Was admitted and started on IV Clindamycin and Doxycycline. ID, podiatry consulted. Pt taken for I&D and wound vac placement. Cultures obtained and grew pasteurella and pseudomonas. ID changed abx to IV Meropenem to cover bacteria and recommended long term IV abx. PICC placed. Pt [facility] discharge and was stable for discharge on 3/22 after abx recs from ID. Consults: ID and general surgery    Discharge Exam:  General appearance: alert, cooperative  Lungs: clear to auscultation bilaterally  Heart: regular rate and rhythm and S1, S2 normal  Abdomen: soft, BS active  Extremities: left LE dressing in place, chronic lymphedema of the LE bilaterally    Labs:   Recent Labs     03/20/22  0609 03/21/22  0538   WBC 7.1 6.8   HGB 9.8* 9.4*   HCT 31.3* 29.3*    266     Recent Labs     03/20/22  0609 03/21/22  0538    133*   K 4.6 4.3    101   CO2 20 19*   BUN 29* 31*   CREATININE 1.29* 1.11*   CALCIUM 8.9 9.2   PHOS 4.2 3.9     No results for input(s): AST, ALT, BILIDIR, BILITOT, ALKPHOS in the last 72 hours. No results for input(s): INR in the last 72 hours. No results for input(s): [place] Luigi in the last 72 hours.     Urinalysis:   Lab Results   Component Value Date    NITRU Negative 01/06/2019    WBCUA 3-5 01/06/2019    BACTERIA Negative 01/06/2019 RBCUA 0-2 01/06/2019    BLOODU Negative 01/06/2019    SPECGRAV 1.031 01/06/2019    GLUCOSEU Negative 01/06/2019       Radiology:   Most recent    Chest CT      WITH CONTRAST:No results found for this or any previous visit. WITHOUT CONTRAST: No results found for this or any previous visit. CXR      2-view: No results found for this or any previous visit. Portable: Results for orders placed during the hospital encounter of 01/06/19    XR CHEST PORTABLE    Narrative  Portable chest radiograph    History: Dyspnea    Technique: AP portable view of the chest obtained. Comparison: None available    Findings:    Heart size is at the upper limits of normal. Linear bibasilar pulmonary opacities. No pneumothorax or pleural effusion. Partially visualized postsurgical changes of the cervical spine. Osseous structures appear intact. Impression  Bibasilar subsegmental atelectasis versus infiltrate. Echo No results found for this or any previous visit. Disposition: SNF    In process/preliminary results:  Outstanding Order Results     Date and Time Order Name Status Description    3/21/2022  5:51 PM IR PICC WO SQ PORT/PUMP > 5 YEARS In process           Patient Instructions:   Current Discharge Medication List      START taking these medications    Details   folic acid (FOLVITE) 1 MG tablet Take 1 tablet by mouth daily  Qty: 30 tablet, Refills: 3      oxyCODONE-acetaminophen (PERCOCET) 5-325 MG per tablet Take 1 tablet by mouth every 6 hours as needed for Pain for up to 3 days. Intended supply: 3 days.  Take lowest dose possible to manage pain  Qty: 12 tablet, Refills: 0    Comments: Reduce doses taken as pain becomes manageable  Associated Diagnoses: Abscess         CONTINUE these medications which have NOT CHANGED    Details   TRULICITY 3 GB/4.0MR SOPN Inject 3 mg into the skin once a week Every Monday      lisinopril (PRINIVIL;ZESTRIL) 10 MG tablet Take 10 mg by mouth in the morning and at bedtime      topiramate (TOPAMAX) 25 MG tablet Take 25 mg by mouth in the morning and at bedtime 2 tabs am 1 tab HS      Cholecalciferol (VITAMIN D) 2000 units CAPS capsule Take by mouth      levothyroxine (SYNTHROID) 25 MCG tablet Take 50 mcg by mouth Daily          STOP taking these medications       mupirocin (BACTROBAN) 2 % ointment Comments:   Reason for Stopping:         buPROPion (WELLBUTRIN XL) 150 MG extended release tablet Comments:   Reason for Stopping:         traZODone (DESYREL) 100 MG tablet Comments:   Reason for Stopping:         lurasidone (LATUDA) 60 MG TABS tablet Comments:   Reason for Stopping:         prazosin (MINIPRESS) 1 MG capsule Comments:   Reason for Stopping:         losartan (COZAAR) 100 MG tablet Comments:   Reason for Stopping:         linagliptin (TRADJENTA) 5 MG tablet Comments:   Reason for Stopping:         amLODIPine (NORVASC) 5 MG tablet Comments:   Reason for Stopping:         docusate sodium (COLACE, DULCOLAX) 100 MG CAPS Comments:   Reason for Stopping:         aspirin 81 MG tablet Comments:   Reason for Stopping:             Activity: activity as tolerated  Diet: diabetic diet  Wound Care: as directed    Follow-up with Miranda Lu  in 2 weeks.     DC time 35 minutes    Signed:  Electronically signed by Joselin Srivastava DO on 3/22/2022 at 2:54 PM

## 2022-03-23 NOTE — PROGRESS NOTES
Report given to Clotilde. Patient transported via stretcher with all belongings. Awake and Alert at time of transfer.

## 2022-03-30 ENCOUNTER — OFFICE VISIT (OUTPATIENT)
Dept: WOUND CARE | Age: 62
End: 2022-03-30
Payer: MEDICAID

## 2022-03-30 DIAGNOSIS — L02.416 ABSCESS OF SKIN OF LEFT KNEE: Primary | ICD-10-CM

## 2022-03-30 DIAGNOSIS — I87.2 VENOUS INSUFFICIENCY OF BOTH LOWER EXTREMITIES: ICD-10-CM

## 2022-03-30 DIAGNOSIS — I89.0 LYMPHEDEMA DUE TO VENOUS INSUFFICIENCY: ICD-10-CM

## 2022-03-30 DIAGNOSIS — I87.2 LYMPHEDEMA DUE TO VENOUS INSUFFICIENCY: ICD-10-CM

## 2022-03-30 PROCEDURE — 99308 SBSQ NF CARE LOW MDM 20: CPT | Performed by: NURSE PRACTITIONER

## 2022-03-30 NOTE — PROGRESS NOTES
[facility] Progress Note   Non-Debridement    Name: Mesfin Tomlinson   Date: 3/30/2022    Subjective:     HISTORY of PRESENT ILLNESS HPI     Mesfin Tomlinson is a 62 y.o. female who presents today for wound/ulcer evaluation. History of Wound Context: Patient presented from home to the ER with reported \"redness of left leg\". Patient states started as small pimple 2/24/22 - area became red and was treated by PCP with doxycycline x 10 days. States- after finishing the doxycycline the redness returned/increased with now purulent drainage coming from area. Left Leg more erythematous than the right leg/thich (unaffected area). Admitted for management of cellulitis, duplex u/s of area showed complex fluid accumulation - concerning for abscess. Dr. Bridger Starr performed an I & D- post op applied wound vac. C & S of wound drainage on 3/16/22 + for Pasteurella multocida and Pseudomonas aeruginosa. Infectious disease change antibiotic to meropenem-as recommended per culture to be continued per PICC line x 2 weeks (from discharge 3/14/22). Today, the left knee wound is clean/fibrotic/granular base, no periwound erythema or purulence. The bilateral lower extremity venous insufficiency wounds show some localized inflammation consistent with chronic venous insufficiency/dermatitis, will change product to hydrofera blue with mupirocin to wound prior to application   Wound/Ulcer Pain Timing/Severity: intermittent  Quality of pain: aching, tender  Severity:  3 / 10   Modifying Factors: Pain worsens with care and subsides following completion of.    Associated Signs/Symptoms: edema, drainage and pain    Ulcer Identification:  Ulcer Type: venous and non-healing/non-surgical  Contributing Factors: edema, venous stasis, lymphedema, diabetes, poor glucose control, decreased mobility, shear force and obesity    Wound: Nonhealing surgical due to infection    Past Medical History:   Diagnosis Date    [facility]ipolar depression ([facility] [address]     Depression     [facility] [address] [facility]     Hypertension     PTSD (post-traumatic stress disorder)     Thyroid disease      Past Surgical History:   Procedure Laterality Date    BREAST LUMPECTOMY Right     CERVICAL LAMINECTOMY      C2-C6    INCISION AND DRAINAGE Left 1/7/2019    INCISION AND DRAINAGE LEFT PERIRECTAL  ABSCESS performed by Wli Porras MD at [address]      WOUND EXPLORATION Left 3/16/2022    INCISION AND DRAINAGE OF LEFT LEG ABSCESS WITH WOUND VAC PLACEMENT performed by Wil Porras MD at [address] History   Problem Relation Age of Onset    Bipolar Disorder Mother     Bipolar Disorder Sister     Bipolar Disorder Brother      Social History     Socioeconomic History    Marital status:      Spouse name: Not on file    Number of children: Not on file    Years of education: Not on file    Highest education level: Not on file   Occupational History    Not on file   Tobacco Use    Smoking status: Never Smoker    Smokeless tobacco: Never Used   Substance and Sexual Activity    Alcohol use: Yes     Comment: occassionally    Drug use: No    Sexual activity: Not on file   Other Topics Concern    Not on file   Social History Narrative    Not on file     Social Determinants of Health     Financial Resource Strain:     Difficulty of Paying Living Expenses: Not on file   Food Insecurity:     Worried About [address] in the Last Year: Not on file    [address] [facility] [address] in the Last Year: Not on file   Transportation Needs:     Lack of Transportation (Medical): Not on file    Lack of Transportation (Non-Medical):  Not on file   Physical Activity:     Days of Exercise per Week: Not on file    Minutes of Exercise per Session: Not on file   Stress:     Feeling of Stress : Not on file   Social Connections:     Frequency of Communication with Friends and Family: Not on file    Frequency of Social Gatherings with Friends and Family: Not on file    Attends [employer] Services: Not on file    Active Member of Clubs or Organizations: Not on file    Attends Club or Organization Meetings: Not on file    Marital Status: Not on file   Intimate Partner Violence:     Fear of Current or Ex-Partner: Not on file    Emotionally Abused: Not on file    Physically Abused: Not on file    Sexually Abused: Not on file   Housing Stability:     Unable to Pay for Housing in the Last Year: Not on file    Number of [place] in the Last Year: Not on file    Unstable Housing in the Last Year: Not on file      Bactrim [sulfamethoxazole-trimethoprim], Penicillins, Prednisone, and Vancomycin   Medication list reviewed with Facility Wound Nurse    Objective:     PHYSICAL EXAM    Constitutional:   Well nourished and well developed. Appears neat and clean. Patient is alert, oriented x3, and in no apparent distress. Respiratory:  Respiratory effort is easy and symmetric bilaterally. Rate is normal at rest and on room air. Vascular:  Pedal Pulses is not palpable likely secondary to edema 3+, audible signal noted with doppler. Capillary refill is <5 sec to digits bilateral.  Extremities positive for pitting edema. Neurological:  Gross and Light touch intact. Protective sensation intact. Dermatological:  Wound description noted in wound assessment. Bilateral lower extremity wounds are covered with drainage, pale pink, will change wound product to accomodate drainage. The left knee wound bed is pink, granular, wound edge from 12:00 to 2:00 is becoming closed, need to inset vac foam under edge to prevent further closure, no purulence. No surrounding erythema. Psychiatric:  Judgement and insight intact. Short and long term memory intact. No evidence of depression- very anxious. Easily becomes tearful, reporting she does not anticipate a good longterm outcome due to increased sx of cervical disc condition.  Needs frequent re-assurance and explanation of what is being done every few minutes, otherwise co-operative with care. Assessment and Plan:      1. Abscess of skin of left knee  Continue IV antibiotics as ordered. When completed monitor for recurrence of sx and if noted- advise infectious disease as soon as noted. Continue wound vac as directed, to reduce edema/drainage to area to expedite healing. 2. Venous insufficiency of both lower extremities  Control venous insufficiency drainage to surrounding areas of skin/to prevent maceration. Keep dressings changed every other day, and keep ace wraps on bilateral lower extremities. Monitor pulses while treating. 3. Lymphedema due to venous insufficiency  Keep legs elevated and light compression on bilateral lower legs, to increase venous return and decrease edema to promote healing of the venous insufficiency wounds. Patient has information on lymphedema pumps which she will need at discharge, review plan of care with her, so she is well informed on the importance of edema control to manage the ulcerations. Wound Care Documentation              Wound Properties  left knee wound    Wound  non-healing post op- I & D    Dressing Status Change each M-W-F   Dressing Changed Wednesday    Dressing/treatment Wound vac- standard orders  125 mm Hg.  Suction    Wound Cleansed Saline    Dressing Change Due Friday    Wound Length (cm)  1.3   Wound Width (cm) 9.3   Wound Depth (cm) 1.5   Wound Assessment    Drainage Amount Small to moderate    Drainage Description Sero sanguinous    Odor None    Margins 12:00 to 2:00 slightly closed-needs foam inserted under to control to prevent formation of epibole    Exposed Structure None    Lina-wound Assessment Intact-non-erythematous    Pink% Wound Bed 50%   Red% Wound Bed 50%   Yellow% Wound Bed 0   Black% Wound Bed 0   Purple% Wound Bed 0   Other% Wound Bed    Culture Taken No        Plan/Orders:  Change left knee wound vac each M-W-F, cleanse wound bed well with saline, and insert foam into wound bed - try to inset some of the foam under the area from 12:00 to 2:00 if able, do not apply foam to intact sushila wound skin, cut the foam to fit the wound. Liquid skin prep the sushila wound, allow to thoroughly dry then window pane sushila wound skin with draping-bridging to an area distal to the wound. If vac suction of for > 2 hours at anytime, remove the vac, and apply wet-dry dressing with ABD over. To the bilateral lower legs, cleanse every other day with saline, then apply lac hydrin lotion while legs area still moist (applying to intact areas of skin only). To bilateral wound areas apply thin layer of mupirocin ointment, then hydrofera blue over wound areas and change every other day. Secure with ABD's and kerlix. May then apply bilateral lower leg ace wraps, starting proximal to toes, and ending just below the knees. Keep legs elevated on pillows to allow heels to float/remain off the bed at all times.      Kings Baker, APRN - NP

## 2022-04-06 ENCOUNTER — OFFICE VISIT (OUTPATIENT)
Dept: INFECTIOUS DISEASES | Age: 62
End: 2022-04-06
Payer: MEDICAID

## 2022-04-06 ENCOUNTER — OFFICE VISIT (OUTPATIENT)
Dept: WOUND CARE | Age: 62
End: 2022-04-06
Payer: MEDICAID

## 2022-04-06 VITALS — SYSTOLIC BLOOD PRESSURE: 150 MMHG | DIASTOLIC BLOOD PRESSURE: 78 MMHG | TEMPERATURE: 98.2 F | HEART RATE: 77 BPM

## 2022-04-06 DIAGNOSIS — L02.416 ABSCESS OF SKIN OF LEFT KNEE: Primary | ICD-10-CM

## 2022-04-06 DIAGNOSIS — I99.9 LYMPHEDEMA DUE TO VENOUS DISEASE: ICD-10-CM

## 2022-04-06 DIAGNOSIS — I87.2 VENOUS INSUFFICIENCY OF BOTH LOWER EXTREMITIES: ICD-10-CM

## 2022-04-06 DIAGNOSIS — I89.0 LYMPHEDEMA DUE TO VENOUS INSUFFICIENCY: ICD-10-CM

## 2022-04-06 DIAGNOSIS — L03.116 CELLULITIS AND ABSCESS OF LEFT LOWER EXTREMITY: Primary | ICD-10-CM

## 2022-04-06 DIAGNOSIS — I87.339 STASIS DERMATITIS WITH VENOUS ULCER OF LOWER EXTREMITY DUE TO CHRONIC PERIPHERAL VENOUS HYPERTENSION (HCC): ICD-10-CM

## 2022-04-06 DIAGNOSIS — S81.802D WOUND OF LEFT LEG, SUBSEQUENT ENCOUNTER: ICD-10-CM

## 2022-04-06 DIAGNOSIS — I89.0 LYMPHEDEMA DUE TO VENOUS DISEASE: ICD-10-CM

## 2022-04-06 DIAGNOSIS — L02.416 CELLULITIS AND ABSCESS OF LEFT LOWER EXTREMITY: Primary | ICD-10-CM

## 2022-04-06 DIAGNOSIS — I87.2 LYMPHEDEMA DUE TO VENOUS INSUFFICIENCY: ICD-10-CM

## 2022-04-06 DIAGNOSIS — L97.909 STASIS DERMATITIS WITH VENOUS ULCER OF LOWER EXTREMITY DUE TO CHRONIC PERIPHERAL VENOUS HYPERTENSION (HCC): ICD-10-CM

## 2022-04-06 DIAGNOSIS — S81.801D WOUND OF RIGHT LEG, SUBSEQUENT ENCOUNTER: ICD-10-CM

## 2022-04-06 PROCEDURE — 1111F DSCHRG MED/CURRENT MED MERGE: CPT | Performed by: INTERNAL MEDICINE

## 2022-04-06 PROCEDURE — 3017F COLORECTAL CA SCREEN DOC REV: CPT | Performed by: INTERNAL MEDICINE

## 2022-04-06 PROCEDURE — 99308 SBSQ NF CARE LOW MDM 20: CPT | Performed by: NURSE PRACTITIONER

## 2022-04-06 PROCEDURE — G8417 CALC BMI ABV UP PARAM F/U: HCPCS | Performed by: INTERNAL MEDICINE

## 2022-04-06 PROCEDURE — G8427 DOCREV CUR MEDS BY ELIG CLIN: HCPCS | Performed by: INTERNAL MEDICINE

## 2022-04-06 PROCEDURE — 11045 DBRDMT SUBQ TISS EACH ADDL: CPT | Performed by: NURSE PRACTITIONER

## 2022-04-06 PROCEDURE — 1036F TOBACCO NON-USER: CPT | Performed by: INTERNAL MEDICINE

## 2022-04-06 PROCEDURE — 11042 DBRDMT SUBQ TIS 1ST 20SQCM/<: CPT | Performed by: NURSE PRACTITIONER

## 2022-04-06 PROCEDURE — 99213 OFFICE O/P EST LOW 20 MIN: CPT | Performed by: INTERNAL MEDICINE

## 2022-04-06 RX ORDER — OXYCODONE HYDROCHLORIDE AND ACETAMINOPHEN 5; 325 MG/1; MG/1
1 TABLET ORAL EVERY 4 HOURS PRN
COMMUNITY

## 2022-04-06 NOTE — PROGRESS NOTES
[facility] Progress Note   Non-Debridement    Name: Mikaela Ferrer   Date: 4/6/2022    Subjective:     HISTORY of PRESENT ILLNESS HPI     Mikaela Ferrer is a 62 y.o. female who presents today for wound/ulcer evaluation. History of Wound Context: Patient presented from home to the ER with reported \"redness of left leg\". Patient states started as small pimple 2/24/22 - area became red and was treated by PCP with doxycycline x 10 days. States- after finishing the doxycycline the redness returned/increased with now purulent drainage coming from area. Left Leg more erythematous than the right leg/thich (unaffected area). Admitted for management of cellulitis, duplex u/s of area showed complex fluid accumulation - concerning for abscess. Dr. Cindy Motta performed an I & D- post op applied wound vac. C & S of wound drainage on 3/16/22 + for Pasteurella multocida and Pseudomonas aeruginosa. Infectious disease change antibiotic to meropenem-as recommended per culture to be continued per PICC line x 2 weeks (from discharge 3/14/22). Today, the left knee wound is clean/fibrotic/granular base, no periwound erythema or purulence. The bilateral lower extremity venous insufficiency wounds show some localized inflammation consistent with chronic venous insufficiency/dermatitis, will change product to hydrofera blue with mupirocin to wound prior to application   Wound/Ulcer Pain Timing/Severity: intermittent  Quality of pain: aching, tender  Severity:  3 / 10   Modifying Factors: Pain worsens with care and subsides following completion of.    Associated Signs/Symptoms: edema, drainage and pain    Ulcer Identification:  Ulcer Type: venous and non-healing/non-surgical  Contributing Factors: edema, venous stasis, lymphedema, diabetes, poor glucose control, decreased mobility, shear force and obesity    Wound: Nonhealing surgical due to infection    Past Medical History:   Diagnosis Date    Bipolar depression ([address]     Depression     Diabetes mellitus ([facility] [address]     Hypertension     PTSD (post-traumatic stress disorder)     Thyroid disease      Past Surgical History:   Procedure Laterality Date    BREAST LUMPECTOMY Right     CERVICAL LAMINECTOMY      C2-C6    INCISION AND DRAINAGE Left 1/7/2019    INCISION AND DRAINAGE LEFT PERIRECTAL  ABSCESS performed by Viviana Watt MD at [address]      WOUND EXPLORATION Left 3/16/2022    INCISION AND DRAINAGE OF LEFT LEG ABSCESS WITH WOUND VAC PLACEMENT performed by Viviana Watt MD [address] History   Problem Relation Age of Onset    Bipolar Disorder Mother     Bipolar Disorder Sister     Bipolar Disorder Brother      Social History     Socioeconomic History    Marital status:      Spouse name: Not on file    Number of children: Not on file    Years of education: Not on file    Highest education level: Not on file   Occupational History    Not on file   Tobacco Use    Smoking status: Never Smoker    Smokeless tobacco: Never Used   Substance and Sexual Activity    Alcohol use: Yes     Comment: occassionally    Drug use: No    Sexual activity: Not on file   Other Topics Concern    Not on file   Social History Narrative    Not on file     Social Determinants of Health     Financial Resource Strain:     Difficulty of Paying Living Expenses: Not on file   Food Insecurity:     Worried About [address] in the Last Year: Not on file    [address] in the Last Year: Not on file   Transportation Needs:     Lack of Transportation (Medical): Not on file    Lack of Transportation (Non-Medical):  Not on file   Physical Activity:     Days of Exercise per Week: Not on file    Minutes of Exercise per Session: Not on file   Stress:     Feeling of Stress : Not on file   Social Connections:     Frequency of Communication with Friends and Family: Not on file    Frequency of Social Gatherings with Friends and Family: Not on file    Attends [employer] Services: Not on file    Active Member of Clubs or Organizations: Not on file    Attends Club or Organization Meetings: Not on file    Marital Status: Not on file   Intimate Partner Violence:     Fear of Current or Ex-Partner: Not on file    Emotionally Abused: Not on file    Physically Abused: Not on file    Sexually Abused: Not on file   Housing Stability:     Unable to Pay for Housing in the Last Year: Not on file    Number of [place] in the Last Year: Not on file    Unstable Housing in the Last Year: Not on file      Bactrim [sulfamethoxazole-trimethoprim], Penicillins, Prednisone, and Vancomycin   Medication list reviewed with Facility Wound Nurse    Objective:     PHYSICAL EXAM    Constitutional:   Well nourished and well developed. Appears neat and clean. Patient is alert, oriented x3, and in no apparent distress. Respiratory:  Respiratory effort is easy and symmetric bilaterally. Rate is normal at rest and on room air. Vascular:  Pedal Pulses is not palpable likely secondary to edema 3+, audible signal noted with doppler. Capillary refill is <5 sec to digits bilateral.  Extremities positive for pitting edema. Neurological:  Gross and Light touch intact. Protective sensation intact. Dermatological:  Wound description noted in wound assessment. Bilateral lower extremity wounds are covered with drainage, pale pink, will change wound product to accomodate drainage. The left knee wound bed is pink, granular, wound edge from 12:00 to 2:00 is becoming closed, need to inset vac foam under edge to prevent further closure, no purulence. No surrounding erythema. Psychiatric:  Judgement and insight intact. Short and long term memory intact. No evidence of depression- very anxious. Easily becomes tearful, reporting she does not anticipate a good longterm outcome due to increased sx of cervical disc condition.  Needs frequent re-assurance and explanation of what is being done every few minutes, otherwise co-operative with care. Assessment and Plan:      1. Abscess of left knee  Continue IV antibiotics as ordered. When completed monitor for recurrence of sx and if noted- advise infectious disease as soon as noted. Dressing changes each M-W-F using silver hydrogel with moistened 2 x 2 - make sure to insert under the area of epibole, cover with dsd, then mepilex. Liquid skin prep to sushila wound before dressing. 2. Venous insufficiency of both lower extremities  Control venous insufficiency drainage to surrounding areas of skin/to prevent maceration. Keep dressings changed every other day, and keep ace wraps on bilateral lower extremities. Monitor pulses while treating. 3. Lymphedema due to venous insufficiency  Keep legs elevated and light compression on bilateral lower legs, to increase venous return and decrease edema to promote healing of the venous insufficiency wounds. Patient has information on lymphedema pumps which she will need at discharge, review plan of care with her, so she is well informed on the importance of edema control to manage the ulcerations. Wound Care Documentation      Procedure Note  Indications:  Based on my examination of this patient's wound(s)/ulcer(s) today, debridement is required to promote healing and evaluate the extent healing. Performed by: BRAD Gaffney - JULIA    Consent obtained:  Yes    Time out taken:  Yes    Pain Control:   none required       Debridement:Excisional Debridement    Using curette the wound(s)/ulcer(s) was/were sharply debrided down through and including the removal of epidermis, dermis and subcutaneous tissue. Devitalized Tissue Debrided:  fibrin, biofilm and slough    Pre Debridement Measurements:  Are located in the Wound/Ulcer Documentation Flow Sheet    Wound/Ulcer #: left knee wound debrided: 100% for 13.95 sq cm.                                Left lateral leg wound debrided 30% for 8.1 sq cm. Right leg wound not debrided-                               Total sq cm. Debrided- 22.05 sq cm. Post Debridement Measurements:  Same as pre-debridement    Percent of Wound/Ulcer Debrided:   Left knee wound - debrided 100% 13.95 sq cm    Left leg wound debrided 30% 8.1 sq cm    Total Surface Area Debrided: 22.05 sq cm     Diabetic/Pressure/Non Pressure Ulcers:  Ulcer: N/A      Bleeding:  Minimal    Hemostasis Achieved:  not needed    Procedural Pain:  0  / 10     Post Procedural Pain:  0 / 10     Response to treatment:  Well tolerated by patient. Picture did not save in Epic     Wound Properties  left lower leg wound - venous insufficiency    Wound Venous stasis ulcer    Dressing Status Change M-W-F   Dressing Changed Wednesday    Dressing/treatment Cleanse with saline, then thin layer of mupirocin with hydrofera blue over, then 4 x 4s and secure with kerlix.     Wound Cleansed Saline    Dressing Change Due Friday   Wound Length (cm) pre debridement 6.0   Wound Width (cm) pre-debridement 4.5   Wound depth (cm) pre-debridement   immeasurable    Post-Procedure Length (cm) 6.0    Post-Procedure Width (cm) 4.5   Post-Procedure Depth (cm) 0.15   Distance Tunneling (cm) None    Drainage Amount Moderate-   Drainage Description Clear tan    Odor none   Margins Well defined    Exposed Structure None    Sushila-wound Assessment Dry/scaly   Pink% Wound Bed 50   Red% Wound Bed 50   Culture Taken No        Wound Properties  left knee wound    Wound  non-healing post op- I & D    Dressing Status Change each M-W-F   Dressing Changed Wednesday    Dressing/treatment Cleanse with saline, silver hydrogel to wound, then 2 x 2 gauze with hydrogel impregnated into the gauze over the wound, then saline moist 2 x 2 over (into the wound), liquid skin prep the sushila wound, then 4 x 4s over then secure with mepilex border    Wound Cleansed Saline    Dressing Change Due Friday    Wound Length (cm) pre-debridement 1.5   Wound Width (cm) pre-debridement 9.3   Wound depth (cm) pre-debridement 1.0   Wound length  (cm) post debridement 1.5   Wound width (cm) post debridement 9.3   Wound depth (cm) post debridement 1.0   Drainage Amount Small     Drainage Description Sero sanguinous    Odor None    Margins 12:00 to 2:00 slightly closed-needs 2 x 2 inserted under to control to prevent formation of epibole    Exposed Structure None    Sushila-wound Assessment Intact-non-erythematous    [place]% Wound Bed 100   Red% Wound Bed    Culture Taken No        Wound Properties  right lateral leg wound    Wound Venous insufficiency    Dressing Status Change each M-W-F   Dressing Changed Wednesday    Dressing/treatment Cleanse with saline, mupirocin to wound area, then hydrofera blue over, then 4 x 4s and secure with kerlix. Wound Cleansed Saline    Dressing Change Due Friday    Wound Length (cm) pre-debridement 3.0    Wound Width (cm) pre-debridement 2.0    Wound Depth (cm) pre-debridement 0.15   Wound length (cm) post debridement n/a   Wound width (cm) post debridement n/a   Wound depth (cm) post debridement n/a   Drainage Amount Small     Drainage Description Sero sanguinous    Odor None    Margins Poorly defined   Exposed Structure None    Sushila-wound Assessment Dry scaly    Pink% Wound Bed 50   Red% Wound Bed 50   Culture Taken No        Plan/Orders:  D/C the wound vac, cleanse wound bed well with saline, apply silver hydrogel directly to the wound, and insert saline moistened silver hydrogel impregnated 2 x 2 into wound bed - try to insert part of the 2 x 2 under the area from 12:00 to 2:00 if able, do not apply moist dressing to intact skin- only into the wound bed. Liquid skin prep the intact surrounding sushila wound and when dry, cover with 4 x 4s, then cover with mepilex border dressing.    To the bilateral lower legs, cleanse M-W-F with saline, then apply lac hydrin lotion while legs

## 2022-04-06 NOTE — PROGRESS NOTES
Subjective:      Patient ID: Richa Bennett is a 62 y.o. female who presents today for:  Chief Complaint   Patient presents with    Cellulitis     [facility], E cellul[facility]/abscess        Patient here for follow-up. She has a history of having a infected hematoma with abscess following a fall. She has pre-existing lymphedema venous stasis changes. She was hospitalized for several days had debridement and wound VAC placement. Cultures that showed Pseudomonas Pasteurella. She has been on meropenem with wound VAC. She had significant clinical improvement currently off at this time no fevers chills or night sweats no problems with her line or antibiotics    Past Medical History:   Diagnosis Date    [facility] [address]     Depression     [facility] ([facility]     Hypertension     PTSD (post-traumatic stress disorder)     Thyroid disease      Past Surgical History:   Procedure Laterality Date    BREAST LUMPECTOMY Right     CERVICAL LAMINECTOMY      C2-C6    INCISION AND DRAINAGE Left 1/7/2019    INCISION AND DRAINAGE LEFT PERIRECTAL  ABSCESS performed by Jessica Stokes MD at [address] [place] [address] Left 3/16/2022    INCISION AND DRAINAGE OF LEFT LEG ABSCESS WITH WOUND VAC PLACEMENT performed by Jessica Stokes MD at [facility]   Allergen Reactions    Bactrim [Sulfamethoxazole-Trimethoprim] Rash    Penicillins Swelling    Prednisone Other (See Comments)     States made her angry and manic    Vancomycin Other (See Comments)     Put her in renal failure per pt         Review of Systems  See HPI    Objective:   BP (!) 150/78   Pulse 77   Temp 98.2 °F (36.8 °C)     General: Patient appears ok at the present time.  NAD  Skin: no new rashes  HEENT:  Neck is supple, No subconjunctival hemorrhages, no oral exudates  Heart: S1 S2  Lungs: clear bilaterally   Abdomen: soft, ND, NTTP,   Back :no CVA tenderness  Extrem: Massive lymphedema bilateral she is got a linear wound area about 1 cm ear by 8 cm good granulation tissue underneath  Neuro exam: CN II-XII intact  Psych: cooperative      Labs: I have reviewed all lab results by electronic record, including most recent CBC, metabolic panel, and pertinent abnormalities were addressed from an infectious disease perspective. Trends are being monitored over time. Radiology:  I have reviewed imaging results per electronic record and most pertinent abnormalities are being addressed from an infectious disease standpoint  Assessment:       Diagnosis Orders   1. Cellulitis and abscess of left lower extremity     2. Lymphedema due to venous disease         Patient's wound significantly healing. Becoming more superficial she normally has a wound VAC over it. Hospital chart reviewed cultures reviewed.  Given  antibiotic allergies she has hesitant to switch to oral therapy given her intolerances and issues in the past.      Plan:      Plan for up to 2 more weeks of IV meropenem  We can follow-up virtually at that time if possible,              Murali Wyatt MD

## 2022-04-13 ENCOUNTER — OFFICE VISIT (OUTPATIENT)
Dept: WOUND CARE | Age: 62
End: 2022-04-13
Payer: MEDICAID

## 2022-04-13 DIAGNOSIS — I89.0 LYMPHEDEMA DUE TO VENOUS INSUFFICIENCY: ICD-10-CM

## 2022-04-13 DIAGNOSIS — S81.802D WOUND OF LEFT LEG, SUBSEQUENT ENCOUNTER: ICD-10-CM

## 2022-04-13 DIAGNOSIS — I87.2 LYMPHEDEMA DUE TO VENOUS INSUFFICIENCY: ICD-10-CM

## 2022-04-13 DIAGNOSIS — L02.416 ABSCESS OF SKIN OF LEFT KNEE: Primary | ICD-10-CM

## 2022-04-13 DIAGNOSIS — I87.2 VENOUS INSUFFICIENCY OF BOTH LOWER EXTREMITIES: ICD-10-CM

## 2022-04-13 DIAGNOSIS — S81.801D WOUND OF RIGHT LEG, SUBSEQUENT ENCOUNTER: ICD-10-CM

## 2022-04-13 DIAGNOSIS — I87.339 STASIS DERMATITIS WITH VENOUS ULCER OF LOWER EXTREMITY DUE TO CHRONIC PERIPHERAL VENOUS HYPERTENSION (HCC): ICD-10-CM

## 2022-04-13 DIAGNOSIS — L97.909 STASIS DERMATITIS WITH VENOUS ULCER OF LOWER EXTREMITY DUE TO CHRONIC PERIPHERAL VENOUS HYPERTENSION (HCC): ICD-10-CM

## 2022-04-13 PROCEDURE — 99308 SBSQ NF CARE LOW MDM 20: CPT | Performed by: NURSE PRACTITIONER

## 2022-04-13 PROCEDURE — 11045 DBRDMT SUBQ TISS EACH ADDL: CPT | Performed by: NURSE PRACTITIONER

## 2022-04-13 PROCEDURE — 11042 DBRDMT SUBQ TIS 1ST 20SQCM/<: CPT | Performed by: NURSE PRACTITIONER

## 2022-04-13 NOTE — PROGRESS NOTES
[facility] Progress Note   Non-Debridement    Name: Jose Ramirez   Date: 4/13/2022    Subjective:     HISTORY of PRESENT ILLNESS HPI     Jose Ramirez is a 62 y.o. female who presents today for wound/ulcer evaluation. History of Wound Context: Patient presented from home to the ER with reported \"redness of left leg\". Patient states started as small pimple 2/24/22 - area became red and was treated by PCP with doxycycline x 10 days. States- after finishing the doxycycline the redness returned/increased with purulent drainage coming from area. Left Leg more erythematous than the right leg/thigh (unaffected area). Admitted for management of cellulitis, duplex u/s of area showed complex fluid accumulation - concerning for abscess. Dr. Lorena Rubio performed an I & D- post op applied wound vac. C & S of wound drainage on 3/16/22 + for Pasteurella multocida and Pseudomonas aeruginosa. Infectious disease change antibiotic to meropenem-as recommended per culture to be continued per PICC line x 2 weeks (from discharge 3/14/22). Today, the left knee wound is clean/fibrotic/granular base, no periwound erythema or purulence. The bilateral lower extremity venous insufficiency wounds show some localized inflammation consistent with chronic venous insufficiency/dermatitis, with one wound laterally on both right and left lower extremities. Will change product to hydrofera blue with mupirocin to wound prior to application. Left lateral leg wound has only small area of ulceration remaining, surrounding area covered with dried/scabbed area. Right with biofilm/dried drainage at wound margin- will debride. Wound/Ulcer Pain Timing/Severity: intermittent  Quality of pain: aching, tender  Severity:  1 / 10   Modifying Factors: Pain worsens with care and subsides following completion of.    Associated Signs/Symptoms: edema, drainage and pain    Ulcer Identification:  Ulcer Type: venous and non-healing/non-surgical  Contributing Factors: edema, venous stasis, lymphedema, diabetes, poor glucose control, decreased mobility, shear force and obesity    Wound: Nonhealing surgical due to infection    Past Medical History:   Diagnosis Date    Bipolar depression ([facility] [place] [facility]     Depression     Diabetes mellitus ([facility] [place] [facility]     Hypertension     PTSD (post-traumatic stress disorder)     Thyroid disease      Past Surgical History:   Procedure Laterality Date    BREAST LUMPECTOMY Right     CERVICAL LAMINECTOMY      C2-C6    INCISION AND DRAINAGE Left 1/7/2019    INCISION AND DRAINAGE LEFT PERIRECTAL  ABSCESS performed by Gina Vaughan MD at [address] [place] [address] Left 3/16/2022    INCISION AND DRAINAGE OF LEFT LEG ABSCESS WITH WOUND VAC PLACEMENT performed by Gina Vaughan MD at [address] History   Problem Relation Age of Onset    Bipolar Disorder Mother     Bipolar Disorder Sister     Bipolar Disorder Brother      Social History     Socioeconomic History    Marital status:      Spouse name: Not on file    Number of children: Not on file    Years of education: Not on file    Highest education level: Not on file   Occupational History    Not on file   Tobacco Use    Smoking status: Never Smoker    Smokeless tobacco: Never Used   Substance and Sexual Activity    Alcohol use: Yes     Comment: occassionally    Drug use: No    Sexual activity: Not on file   Other Topics Concern    Not on file   Social History Narrative    Not on file     Social Determinants of Health     Financial Resource Strain:     Difficulty of Paying Living Expenses: Not on file   Food Insecurity:     Worried About [address] in the Last Year: Not on file    [place] of Food in the Last Year: Not on file   Transportation Needs:     Lack of Transportation (Medical): Not on file    Lack of Transportation (Non-Medical):  Not on file Physical Activity:     Days of Exercise per Week: Not on file    Minutes of Exercise per Session: Not on file   Stress:     Feeling of Stress : Not on file   Social Connections:     Frequency of Communication with Friends and Family: Not on file    Frequency of Social Gatherings with Friends and Family: Not on file    Attends Hoahaoism Services: Not on file    Active Member of [address] [place] [address] or Organizations: Not on file    Attends Club or Organization Meetings: Not on file    Marital Status: Not on file   Intimate Partner Violence:     Fear of Current or Ex-Partner: Not on file    Emotionally Abused: Not on file    Physically Abused: Not on file    Sexually Abused: Not on file   Housing Stability:     Unable to Pay for Housing in the Last Year: Not on file    Number of [place] in the Last Year: Not on file    Unstable Housing in the Last Year: Not on file      Bactrim [sulfamethoxazole-trimethoprim], Penicillins, Prednisone, and Vancomycin   Medication list reviewed with Facility Wound Nurse    Objective:     PHYSICAL EXAM    Constitutional:   Well nourished and well developed. Appears neat and clean. Patient is alert, oriented x3, and in no apparent distress. Respiratory:  Respiratory effort is easy and symmetric bilaterally. Rate is normal at rest and on room air. Vascular:  Pedal Pulses not palpable likely secondary to edema 3+, audible signal noted with doppler. Capillary refill is <5 sec to digits bilateral.  Extremities positive for pitting edema/bilateral lymphedema. Neurological:  Gross and Light touch intact. Protective sensation diminished but present. Dermatological:  Wound description noted in wound assessment. Bilateral lower extremity wounds are covered with drainage, pale pink/red, left lateral wound has some sushila wound dried crusted drainage. Right lateral leg wound with biofilm present and dried crusted drainage at wound edge, will debride.  The left knee wound bed is pink, granular, wound edge from 12:00 to 2:00 still has epibole, will silver nitrate edge, debride and change dressing to hydrofera blue. Psychiatric:  Judgement and insight intact. Short and long term memory intact. No evidence of depression-less anxious this week. In better spirits in general. Needs less frequent re-assurance and explanation of what is being done every few minutes then previously. Assessment and Plan:      1. Abscess of left knee  Continue to monitor for recurrence of sx and if noted since IV antibiotics now completed, advise infectious disease as soon as noted. Dressing changes each M-W-F using hydrofera blue (moistened with saline)  - make sure to insert under the area of epibole, cover with dsd, then mepilex. Liquid skin prep to sushila wound before dressing. 2. Venous insufficiency of both lower extremities - with BLE ulcerations  Control venous insufficiency drainage to surrounding areas of skin/to prevent maceration. Keep dressings changed every other day using hydrofera blue, wrap with kerlix, and keep ace wraps on bilateral lower extremities. Encourage short but frequent periods of ambulation to increase bloodflow to wound areas, but when finished, elevate legs to reduce edema. 3. Lymphedema due to venous insufficiency  Keep legs elevated and light compression on bilateral lower legs, to increase venous return and decrease edema to promote healing of the venous insufficiency wounds. Patient has information on lymphedema pumps which she will need at discharge, review plan of care with her, so she is well informed on the importance of edema control to manage the ulcerations. Wound Care Documentation      Procedure Note  Indications:  Based on my examination of this patient's wound(s)/ulcer(s) today, debridement is required to promote healing and evaluate the extent healing.     Performed by: BRAD Mata NP    Consent obtained:  Yes    Time out taken:  Yes    Pain Control:   none required       Debridement:Excisional Debridement    Using curette the wound(s)/ulcer(s) was/were sharply debrided down through and including the removal of epidermis, dermis and subcutaneous tissue. Devitalized Tissue Debrided:  fibrin, biofilm and slough    Pre Debridement Measurements:  Are located in the Wound/Ulcer Documentation Flow Sheet    Wound/Ulcer #: left knee wound debrided 100% for 14.55 sq cm. Right leg wound debrided 100% for 6.09 sq cm. Total sq cm. Debrided- 20.64 sq cm. Post Debridement Measurements:  Same as pre-debridement    Total Surface Area Debrided: 20.64 sq cm     Diabetic/Pressure/Non Pressure Ulcers:  Ulcer: N/A      Bleeding:  Minimal    Hemostasis Achieved:  not needed    Procedural Pain:  0  / 10     Post Procedural Pain:  0 / 10     Response to treatment:  Well tolerated by patient. Chemical Cautery of left knee wound- epibole from 12:00 to 2:00     Performed by: BRAD Espinoza NP    Consent obtained: Yes    Time out taken: Yes    Tissue Type: Other: epibole     Pain Control:   none needed     Method: silver nitrate    Location of Chemical Cautery:   Wound/Ulcer # left knee wound      Negative Pressure Wound Therapy Knee Anterior; Left (Active)   $ Standard NPWT <=50 sq cm PER TX $ Yes 03/18/22 1034   Wound Type Surgical 03/18/22 1034   Unit Type KCI 03/21/22 2139   Dressing Type Black foam 03/20/22 0908   Number of pieces used 1 03/18/22 1034   Cycle Continuous 03/22/22 2010   Target Pressure (mmHg) 125 03/22/22 2010   Canister changed? No 03/18/22 2200   Dressing Status Clean;Dry; Intact 03/22/22 2010   Dressing Changed Changed/New 03/18/22 1034   Drainage Amount Scant 03/18/22 1034   Drainage Description Serosanguinous 03/22/22 2010   Dressing Change Due 03/21/22 03/18/22 2200   Wound Assessment Red;Granulation tissue 03/18/22 1034   Odor None 03/22/22 2010   Number of days: 28 Wound 03/14/22 Pelvis Left open area skin fold: Moisture Associated Skin Damage (MASD) (Active)   Wound Etiology Other 03/15/22 1005   Dressing Status New dressing applied 03/22/22 2010   Dressing/Treatment Pharmaceutical agent (see MAR) 03/22/22 2010   Wound Assessment Pink/red 03/22/22 2010   Drainage Amount None 03/22/22 2010   Odor None 03/22/22 2010   Number of days: 29       Wound 03/14/22 Leg Right;Distal;Lateral (Active)   Wound Etiology Venous 03/22/22 2010   Dressing Status New dressing applied 03/22/22 2010   Wound Cleansed Cleansed with saline 03/18/22 1910   Dressing/Treatment Other (comment) 03/22/22 2010   Wound Length (cm) 14 cm 03/15/22 1005   Wound Width (cm) 4 cm 03/15/22 1005   Wound Depth (cm) 0.1 cm 03/15/22 1005   Wound Surface Area (cm^2) 56 cm^2 03/15/22 1005   Wound Volume (cm^3) 5.6 cm^3 03/15/22 1005   Wound Assessment Pink/red 03/22/22 2010   Drainage Amount Small 03/22/22 2010   Drainage Description Serous 03/22/22 2010   Odor None 03/22/22 2010   Lina-wound Assessment Maceration 03/15/22 1005   Margins Undefined edges 03/15/22 1005   Wound Thickness Description not for Pressure Injury Full thickness 03/15/22 1005   Number of days: 30       Wound 03/14/22 Leg Left;Distal;Lateral (Active)   Wound Etiology Venous 03/22/22 2010   Dressing Status New dressing applied 03/22/22 2010   Wound Cleansed Cleansed with saline 03/22/22 2010   Dressing/Treatment Dry dressing 03/16/22 2205   Wound Length (cm) 9 cm 03/15/22 1005   Wound Width (cm) 3 cm 03/15/22 1005   Wound Depth (cm) 0.1 cm 03/15/22 1005   Wound Surface Area (cm^2) 27 cm^2 03/15/22 1005   Wound Volume (cm^3) 2.7 cm^3 03/15/22 1005   Wound Assessment Pink/red 03/22/22 2010   Drainage Amount Scant 03/22/22 2010   Drainage Description Serous 03/22/22 2010   Odor None 03/22/22 2010   Lina-wound Assessment Maceration 03/15/22 1005   Margins Undefined edges 03/15/22 1005   Wound Thickness Description not for Pressure Injury Full thickness saline prior to inserting into wound bed, liquid skin prep the sushila wound, then 4 x 4s over then secure with mepilex border    Wound Cleansed Saline    Dressing Change Due Friday    Wound Length (cm) pre-debridement 1.5   Wound Width (cm) pre-debridement 9.7   Wound depth (cm) pre-debridement 0.8   Wound length  (cm) post debridement 1.5   Wound width (cm) post debridement 9.7   Wound depth (cm) post debridement 0.8    Drainage Amount Small     Drainage Description Sero sanguinous    Odor None    Margins 12:00 to 2:00 slightly closed-epibole    Exposed Structure None    Sushila-wound Assessment Intact-non-erythematous    Pink% Wound Bed 33.30   Yellow% wound bed  33.3   Red% Wound Bed 33.3   Culture Taken No        Wound Properties  right lateral leg wound    Wound Venous insufficiency - lymphedema    Dressing Status Change each M-W-F   Dressing Changed Wednesday    Dressing/treatment Cleanse with saline- mupirocin to wound bed, then hydrofera blue over  - secure with mepilex border    Wound Cleansed Saline    Dressing Change Due Friday    Wound Length (cm) pre-debridement 2.9   Wound Width (cm) pre-debridement 2.1    Wound Depth (cm) pre-debridement 0.2   Wound length (cm) post debridement 2.9   Wound width (cm) post debridement 2.1   Wound depth (cm) post debridement 0.2   Drainage Amount Small     Drainage Description Sero sanguinous    Odor None    Margins Poorly defined   Exposed Structure None    Sushila-wound Assessment Dry scaly    [place]% Wound Bed 45   Yellow% wound bed  10   Red% Wound Bed 45   Culture Taken No        Plan/Orders:  Knee dressing: Cleanse wound bed well with saline, and insert saline moistened hydrofera blue cut to fit the wound, with 4 x 4 gauze over. Liquid skin prep the intact surrounding sushila wound and when dry, cover with 4 x 4s, then cover with mepilex border dressing.    To the bilateral lower legs, cleanse M-W-F with saline, then apply lac hydrin lotion while legs area still moist (applying to intact areas of skin only). To right lateral leg wound area: Each M-W-F, cleanse with saline and apply thin layer of mupirocin ointment, then hydrofera blue over wound areas and change every other day. Secure with ABD's and kerlix. To left lateral leg wound- cleanse with saline, and apply thin layer of mupirocin ointment and cover with dsd, secure with kerlix. May then apply bilateral lower leg ace wraps, starting proximal to toes, and ending just below the knees. Keep legs elevated on pillows to allow heels to float/remain off the bed at all times.      Brayan Gonzalez, APRN - NP

## 2022-04-20 ENCOUNTER — TELEMEDICINE (OUTPATIENT)
Dept: INFECTIOUS DISEASES | Age: 62
End: 2022-04-20
Payer: MEDICAID

## 2022-04-20 DIAGNOSIS — T14.8XXA INFECTION OF WOUND HEMATOMA: Primary | ICD-10-CM

## 2022-04-20 DIAGNOSIS — A49.8 PSEUDOMONAS AERUGINOSA INFECTION: ICD-10-CM

## 2022-04-20 DIAGNOSIS — L08.9 INFECTION OF WOUND HEMATOMA: Primary | ICD-10-CM

## 2022-04-20 PROCEDURE — 3017F COLORECTAL CA SCREEN DOC REV: CPT | Performed by: INTERNAL MEDICINE

## 2022-04-20 PROCEDURE — 1036F TOBACCO NON-USER: CPT | Performed by: INTERNAL MEDICINE

## 2022-04-20 PROCEDURE — G8428 CUR MEDS NOT DOCUMENT: HCPCS | Performed by: INTERNAL MEDICINE

## 2022-04-20 PROCEDURE — 1111F DSCHRG MED/CURRENT MED MERGE: CPT | Performed by: INTERNAL MEDICINE

## 2022-04-20 PROCEDURE — 99213 OFFICE O/P EST LOW 20 MIN: CPT | Performed by: INTERNAL MEDICINE

## 2022-04-20 PROCEDURE — G8417 CALC BMI ABV UP PARAM F/U: HCPCS | Performed by: INTERNAL MEDICINE

## 2022-04-20 ASSESSMENT — ENCOUNTER SYMPTOMS
VOMITING: 1
DIARRHEA: 1
ABDOMINAL PAIN: 1

## 2022-04-20 NOTE — PROGRESS NOTES
Jose Ramirez (:  1960) is a Established patient, here for evaluation of the following:    Assessment & Plan   Below is the assessment and plan developed based on review of pertinent history, physical exam, labs, studies, and medications. Left knee infected hematoma  Pseudomonas aeruginosa infection  History of spinal disorder 2ry to [facility] with paraplegia and fall    DC PICC line  DC meropenem  Continue local wound care  May discharge from nursing home when okay with other physicians  Follow-up as needed  HPI   Follow-up left knee infected hematoma with Pseudomonas aeruginosa following a fall. Currently at nursing home on IV meropenem which well-tolerated   Had 1 day of diarrhea vomiting with negative C. difficile and GI panel   Left heel wound with progressive healing   she has pre-existing lymphedema venous stasis changes. She was hospitalized for several days had debridement and wound VAC placement. Cultures that showed Pseudomonas. She has been on meropenem with wound VAC  Review of Systems   Gastrointestinal: Positive for abdominal pain, diarrhea (x 1 day) and vomiting.    Physical Exam  [INSTRUCTIONS:  \"[x]\" Indicates a positive item  \"[]\" Indicates a negative item  -- DELETE ALL ITEMS NOT EXAMINED]    Constitutional: [x] Appears well-developed and well-nourished [x] No apparent distress      [] Abnormal -     Mental status: [x] Alert and awake  [x] Oriented to person/place/time [x] Able to follow commands    [] Abnormal -     Eyes:   EOM    [x]  Normal    [] Abnormal -   Sclera  [x]  Normal    [] Abnormal -          Discharge [x]  None visible   [] Abnormal -     HENT: [x] Normocephalic, atraumatic  [] Abnormal -   [x] Mouth/Throat: Mucous membranes are moist    External Ears [x] Normal  [] Abnormal -    Neck: [x] No visualized mass [] Abnormal -     Pulmonary/Chest: [x] Respiratory effort normal   [x] No visualized signs of difficulty breathing or respiratory distress        [] Abnormal - Musculoskeletal:   [] Normal gait with no signs of ataxia         [x] Normal range of motion of neck        [x] Abnormal - walks with a walker  Neurological:        [x] No Facial Asymmetry (Cranial nerve 7 motor function) (limited exam due to video visit)          [x] No gaze palsy        [] Abnormal -          Skin:        [x] No significant exanthematous lesions or discoloration noted on facial skin         [] Abnormal -            Psychiatric:       [x] Normal Affect [] Abnormal -        [x] No Hallucinations    Other pertinent observable physical exam findings:-  L thigh/knee  8 cm x 1.7 cm 3 mm depth  Hydrogel dressing     Photo was obtained with patient's permission        On this date 4/20/2022 I have spent 20 minutes reviewing previous notes, test results and face to face (virtual) with the patient discussing the diagnosis and importance of compliance with the treatment plan as well as documenting on the day of the visit. Estelle Carballo, was evaluated through a synchronous (real-time) audio-video encounter. The patient (or guardian if applicable) is aware that this is a billable service, which includes applicable co-pays. This Virtual Visit was conducted with patient's (and/or legal guardian's) consent. The visit was conducted pursuant to the emergency declaration under the [address] [place] [address] authority and the [place] General Act. Patient identification was verified, and a caregiver was present when appropriate. The patient was located at home in a state where the provider was licensed to provide care.        --Karuna Lopez MD

## 2022-04-27 ENCOUNTER — OFFICE VISIT (OUTPATIENT)
Dept: WOUND CARE | Age: 62
End: 2022-04-27
Payer: MEDICAID

## 2022-04-27 DIAGNOSIS — I87.2 LYMPHEDEMA DUE TO VENOUS INSUFFICIENCY: ICD-10-CM

## 2022-04-27 DIAGNOSIS — I89.0 LYMPHEDEMA DUE TO VENOUS INSUFFICIENCY: ICD-10-CM

## 2022-04-27 DIAGNOSIS — L97.909 STASIS DERMATITIS WITH VENOUS ULCER OF LOWER EXTREMITY DUE TO CHRONIC PERIPHERAL VENOUS HYPERTENSION (HCC): ICD-10-CM

## 2022-04-27 DIAGNOSIS — S81.802D WOUND OF LEFT LEG, SUBSEQUENT ENCOUNTER: ICD-10-CM

## 2022-04-27 DIAGNOSIS — L02.416 ABSCESS OF SKIN OF LEFT KNEE: Primary | ICD-10-CM

## 2022-04-27 DIAGNOSIS — I87.2 VENOUS INSUFFICIENCY OF BOTH LOWER EXTREMITIES: ICD-10-CM

## 2022-04-27 DIAGNOSIS — I87.339 STASIS DERMATITIS WITH VENOUS ULCER OF LOWER EXTREMITY DUE TO CHRONIC PERIPHERAL VENOUS HYPERTENSION (HCC): ICD-10-CM

## 2022-04-27 DIAGNOSIS — S81.801D WOUND OF RIGHT LEG, SUBSEQUENT ENCOUNTER: ICD-10-CM

## 2022-04-27 PROCEDURE — 99308 SBSQ NF CARE LOW MDM 20: CPT | Performed by: NURSE PRACTITIONER

## 2022-04-27 NOTE — PROGRESS NOTES
[facility] Progress Note   Debridement Note    Name: Goyo Contreras   Date: 4/27/2022    Subjective:     HISTORY of PRESENT ILLNESS HPI     Goyo Contreras is a 62 y.o. female who presents today for wound/ulcer evaluation. History of Wound Context: Patient presented from home to the ER with reported \"redness of left leg\". Patient states started as small pimple 2/24/22 - area became red and was treated by PCP with doxycycline x 10 days. States- after finishing the doxycycline the redness returned/increased with purulent drainage coming from area. Left Leg more erythematous than the right leg/thigh (unaffected area). Admitted for management of cellulitis, duplex u/s of area showed complex fluid accumulation - concerning for abscess. Dr. Mary Marx performed an I & D- post op applied wound vac. C & S of wound drainage on 3/16/22 + for Pasteurella multocida and Pseudomonas aeruginosa. Infectious disease change antibiotic to meropenem-as recommended per culture to be continued per PICC line x 2 weeks (from discharge 3/14/22). Today, the left knee wound is clean/fibrotic/granular base, no periwound erythema or purulence. Epibole improved, some epthelialization occurring at wound edges. The right lateral leg wound is covered with clear tan/drainage surrounding wound margins-will debride. Wound/Ulcer Pain Timing/Severity: intermittent  Quality of pain: aching, tender  Severity:  1 / 10   Modifying Factors: Pain worsens with care and subsides following completion of.    Associated Signs/Symptoms: edema, drainage and pain    Ulcer Identification:  Ulcer Type: venous and non-healing/non-surgical  Contributing Factors: edema, venous stasis, lymphedema, diabetes, poor glucose control, decreased mobility, shear force and obesity    Wound: Nonhealing surgical due to infection    Past Medical History:   Diagnosis Date    [facility] depression [facility] [address]     Depression     Diabetes mellitus ([address]     Hypertension     PTSD (post-traumatic stress disorder)     Thyroid disease      Past Surgical History:   Procedure Laterality Date    BREAST LUMPECTOMY Right     CERVICAL LAMINECTOMY      C2-C6    INCISION AND DRAINAGE Left 1/7/2019    INCISION AND DRAINAGE LEFT PERIRECTAL  ABSCESS performed by Jimmie Palacios MD at [address]      WOUND EXPLORATION Left 3/16/2022    INCISION AND DRAINAGE OF LEFT LEG ABSCESS WITH WOUND VAC PLACEMENT performed by Jimmie Palacios MD at [address] History   Problem Relation Age of Onset    Bipolar Disorder Mother     Bipolar Disorder Sister     Bipolar Disorder Brother      Social History     Socioeconomic History    Marital status:      Spouse name: Not on file    Number of children: Not on file    Years of education: Not on file    Highest education level: Not on file   Occupational History    Not on file   Tobacco Use    Smoking status: Never Smoker    Smokeless tobacco: Never Used   Substance and Sexual Activity    Alcohol use: Yes     Comment: occassionally    Drug use: No    Sexual activity: Not on file   Other Topics Concern    Not on file   Social History Narrative    Not on file     Social Determinants of Health     Financial Resource Strain:     Difficulty of Paying Living Expenses: Not on file   Food Insecurity:     Worried About [address] in the Last Year: Not on file    [address] in the Last Year: Not on file   Transportation Needs:     Lack of Transportation (Medical): Not on file    Lack of Transportation (Non-Medical):  Not on file   Physical Activity:     Days of Exercise per Week: Not on file    Minutes of Exercise per Session: Not on file   Stress:     Feeling of Stress : Not on file   Social Connections:     Frequency of Communication with Friends and Family: Not on file    Frequency of Social Gatherings with Friends and Family: Not on file    Attends [employer]oration[employer] Services: Not on file    Active Member of Clubs or Organizations: Not on file    Attends Club or Organization Meetings: Not on file    Marital Status: Not on file   Intimate Partner Violence:     Fear of Current or Ex-Partner: Not on file    Emotionally Abused: Not on file    Physically Abused: Not on file    Sexually Abused: Not on file   Housing Stability:     Unable to Pay for Housing in the Last Year: Not on file    Number of [place] in the Last Year: Not on file    Unstable Housing in the Last Year: Not on file      Bactrim [sulfamethoxazole-trimethoprim], Penicillins, Prednisone, and Vancomycin   Medication list reviewed with Facility Wound Nurse    Objective:     PHYSICAL EXAM    Constitutional:   Well nourished and well developed. Appears neat and clean. Patient is alert, oriented x3, and in no apparent distress. Respiratory:  Respiratory effort is easy and symmetric bilaterally. Rate is normal at rest and on room air. Vascular:  Pedal Pulses not palpable likely secondary to edema 3+, audible signal noted with doppler. Capillary refill is <5 sec to digits bilateral.  Extremities positive for pitting edema/bilateral lymphedema. Neurological:  Gross and Light touch intact. Protective sensation diminished but present. Dermatological:  Wound description noted in wound assessment. Right lateral leg wound with biofilm present and dried crusted drainage at wound edge, will debride. The left knee wound bed is pink, granular, wound edge from 12:00 to 2:00  epibole, improved with some epithelialization taking place, moderate clear dried tan crusted drainage present on wound bed. Will debride. Psychiatric:  Judgement and insight intact. Short and long term memory intact. No evidence of depression-less anxious this week. In better spirits in general. Needs less frequent re-assurance and explanation of what is being done every few minutes then previously.      Assessment and Plan: 1. Abscess of left knee  Continue to monitor for recurrence of sx and if noted since IV antibiotics now completed, advise infectious disease as soon as noted. Dressing changes each M-W-F using hydrofera blue (moistened with saline)  -cover with dsd, then mepilex. Liquid skin prep to sushila wound before dressing. 2. Venous insufficiency rightlower extremities - with ulcerations  Control venous insufficiency drainage to surrounding areas of skin/to prevent maceration. Keep dressing to right lateral leg  changed every other day using hydrofera blue, wrap with kerlix, and keep ace wraps on bilateral lower extremities. Encourage short but frequent periods of ambulation to increase bloodflow to wound areas, but when finished, elevate legs to reduce edema. 3. Lymphedema due to venous insufficiency  Keep legs elevated and light compression (ace wraps) on bilateral lower legs, to increase venous return and decrease edema to promote healing of the venous insufficiency wounds. Patient has information on lymphedema pumps which she will need at discharge, review plan of care with her, so she is well informed on the importance of edema control to manage the ulcerations. Wound Care Documentation      Procedure Note  Indications:  Based on my examination of this patient's wound(s)/ulcer(s) today, debridement is required to promote healing and evaluate the extent healing. Performed by: BRAD Woods NP    Consent obtained:  Yes    Time out taken:  Yes    Pain Control:   none required       Debridement:Excisional Debridement    Using curette the wound(s)/ulcer(s) was/were sharply debrided down through and including the removal of epidermis, dermis and subcutaneous tissue. Devitalized Tissue Debrided:  fibrin, biofilm and slough    Pre Debridement Measurements:  Are located in the Wound/Ulcer Documentation Flow Sheet    Wound/Ulcer #: left knee wound debrided 100% for 14.62 sq cm. Right leg wound debrided 100% for 2.55 sq cm. Total sq cm. Debrided- 17.17 sq cm. Post Debridement Measurements:  Same as pre-debridement    Total Surface Area Debrided: 17.17 sq cm     Diabetic/Pressure/Non Pressure Ulcers:  Ulcer: N/A      Bleeding:  Minimal    Hemostasis Achieved:  not needed    Procedural Pain:  0  / 10     Post Procedural Pain:  0 / 10     Response to treatment:  Well tolerated by patient. Negative Pressure Wound Therapy Knee Anterior; Left (Active)   Number of days: 42       Wound 03/14/22 Pelvis Left open area skin fold: Moisture Associated Skin Damage (MASD) (Active)   Number of days: 43       Wound 03/14/22 Leg Right;Distal;Lateral (Active)   Number of days: 44       Wound 03/14/22 Leg Left;Distal;Lateral (Active)   Number of days: 44     Incision 01/07/19 Perineum Left (Active)   Number of days: 1206       Incision 03/16/22 Knee Anterior; Left (Active)   Number of days: 42       Procedural Pain: 0  / 10     Post Procedural Pain: 0 / 10     Response to treatment: Well tolerated by patient.         4/27/22 left knee wound           Wound Properties  left knee wound    Wound  non-healing post op- I & D    Dressing Status Change each M-W-F   Dressing Changed Wednesday    Dressing/treatment Cleanse with saline, cut hydrofera blue to fit wound bed, hydrate with sterile saline prior to inserting into wound bed, liquid skin prep the sushila wound, then 4 x 4s over then secure with mepilex border    Wound Cleansed Saline    Dressing Change Due Friday    Wound Length (cm) pre-debridement 1.7   Wound Width (cm) pre-debridement 8.6   Wound depth (cm) pre-debridement 0.5   Wound length  (cm) post debridement 1.7   Wound width (cm) post debridement 8.6   Wound depth (cm) post debridement 0.5   Drainage Amount Small     Drainage Description Sero sanguinous    Odor None    Margins  open   With 12 to 2:00 closed    Exposed Structure None    Sushila-wound Assessment Intact-non-erythematous    Pink% Wound Bed    Yellow% wound bed  20   Red% Wound Bed 80   Culture Taken No        Right lateral leg wound   4/27/22  Picture did not save in Epic     Wound Properties  right lateral leg wound    Wound Venous insufficiency - lymphedema    Dressing Status Change each M-W-F   Dressing Changed Wednesday    Dressing/treatment Cleanse with saline- mupirocin to wound bed, then hydrofera blue over  - secure with mepilex border    Wound Cleansed Saline    Dressing Change Due Friday    Wound Length (cm) pre-debridement 1.7   Wound Width (cm) pre-debridement 1.5    Wound Depth (cm) pre-debridement 0.2   Wound length (cm) post debridement 1.7   Wound width (cm) post debridement 1.5   Wound depth (cm) post debridement 0.2   Drainage Amount Small     Drainage Description Sero sanguinous    Odor None    Margins Poorly defined   Exposed Structure None    Sushila-wound Assessment Dry scaly    Pink% Wound Bed 50   Yellow% wound bed  50   Red% Wound Bed    Culture Taken No        Plan/Orders:  Knee dressing: Cleanse wound bed well with saline, and insert saline moistened hydrofera blue cut to fit the wound, with 4 x 4 gauze over. Liquid skin prep the intact surrounding sushila wound and when dry, cover with 4 x 4s, then cover with mepilex border dressing. To the bilateral lower legs, cleanse M-W-F with saline, then apply lac hydrin lotion while legs area still moist (applying to intact areas of skin only). To right lateral leg wound area: Each M-W-F, cleanse with saline and apply thin layer of mupirocin ointment, then hydrofera blue over wound areas and change every other day. Secure with ABD's and kerlix. To left lateral leg wound- cleanse with saline, and apply thin layer of mupirocin ointment and cover with dsd, secure with kerlix. May then apply bilateral lower leg ace wraps, starting proximal to toes, and ending just below the knees.    Keep legs elevated on pillows to allow heels to

## 2022-05-04 ENCOUNTER — OFFICE VISIT (OUTPATIENT)
Dept: WOUND CARE | Age: 62
End: 2022-05-04
Payer: MEDICAID

## 2022-05-04 DIAGNOSIS — S81.802D WOUND OF LEFT LEG, SUBSEQUENT ENCOUNTER: ICD-10-CM

## 2022-05-04 DIAGNOSIS — I87.339 STASIS DERMATITIS WITH VENOUS ULCER OF LOWER EXTREMITY DUE TO CHRONIC PERIPHERAL VENOUS HYPERTENSION (HCC): ICD-10-CM

## 2022-05-04 DIAGNOSIS — I87.2 VENOUS INSUFFICIENCY OF BOTH LOWER EXTREMITIES: ICD-10-CM

## 2022-05-04 DIAGNOSIS — I89.0 LYMPHEDEMA DUE TO VENOUS INSUFFICIENCY: ICD-10-CM

## 2022-05-04 DIAGNOSIS — I87.2 LYMPHEDEMA DUE TO VENOUS INSUFFICIENCY: ICD-10-CM

## 2022-05-04 DIAGNOSIS — L02.416 ABSCESS OF SKIN OF LEFT KNEE: Primary | ICD-10-CM

## 2022-05-04 DIAGNOSIS — S81.801D WOUND OF RIGHT LEG, SUBSEQUENT ENCOUNTER: ICD-10-CM

## 2022-05-04 DIAGNOSIS — L97.909 STASIS DERMATITIS WITH VENOUS ULCER OF LOWER EXTREMITY DUE TO CHRONIC PERIPHERAL VENOUS HYPERTENSION (HCC): ICD-10-CM

## 2022-05-04 PROCEDURE — 99308 SBSQ NF CARE LOW MDM 20: CPT | Performed by: NURSE PRACTITIONER

## 2022-05-04 NOTE — PROGRESS NOTES
[facility] Progress Note   Debridement Note    Name: Becki Buck   Date: 5/4/2022    Subjective:     HISTORY of PRESENT ILLNESS HPI     Becki Buck is a 62 y.o. female who presents today for wound/ulcer evaluation. History of Wound Context: Patient presented from home to the ER with reported \"redness of left leg\". Patient states started as small pimple 2/24/22 - area became red and was treated by PCP with doxycycline x 10 days. States- after finishing the doxycycline the redness returned/increased with purulent drainage coming from area. Admitted for management of cellulitis, duplex u/s of area showed complex fluid accumulation - concerning for abscess. Dr. Mary Darling performed an I & D- post op applied wound vac. C & S of wound drainage on 3/16/22 + for Pasteurella multocida and Pseudomonas aeruginosa. Infectious disease change antibiotic to meropenem-as recommended per culture to be continued per PICC line x 2 weeks (from discharge 3/14/22). Today, the left knee wound is clean/fibrotic/granular base, no periwound erythema or purulence. Epibole improved, epthelialization occurring at wound edges. The right lateral leg wound is covered with clear tan/drainage surrounding wound margins-will debride. Planning discharge to home with h.h. follow up in 1 week. Wound/Ulcer Pain Timing/Severity: intermittent  Quality of pain: aching, tender  Severity: 0 / 10   Modifying Factors: Pain worsens with care and subsides following completion of.    Associated Signs/Symptoms: edema, drainage and pain    Ulcer Identification:  Ulcer Type: venous and non-healing/non-surgical  Contributing Factors: edema, venous stasis, lymphedema, diabetes, poor glucose control, decreased mobility, shear force and obesity    Wound: Nonhealing surgical due to infection    Past Medical History:   Diagnosis Date    Bipolar depression ([facility] [address]     Depression     Diabetes mellitus ([facility] [address]     Hypertension     PTSD (post-traumatic stress disorder)     Thyroid disease      Past Surgical History:   Procedure Laterality Date    BREAST LUMPECTOMY Right     CERVICAL LAMINECTOMY      C2-C6    INCISION AND DRAINAGE Left 1/7/2019    INCISION AND DRAINAGE LEFT PERIRECTAL  ABSCESS performed by Florentino Sal MD at [address]      WOUND EXPLORATION Left 3/16/2022    INCISION AND DRAINAGE OF LEFT LEG ABSCESS WITH WOUND VAC PLACEMENT performed by Florentino Sal MD [address] History   Problem Relation Age of Onset    Bipolar Disorder Mother     Bipolar Disorder Sister     Bipolar Disorder Brother      Social History     Socioeconomic History    Marital status:      Spouse name: Not on file    Number of children: Not on file    Years of education: Not on file    Highest education level: Not on file   Occupational History    Not on file   Tobacco Use    Smoking status: Never Smoker    Smokeless tobacco: Never Used   Substance and Sexual Activity    Alcohol use: Yes     Comment: occassionally    Drug use: No    Sexual activity: Not on file   Other Topics Concern    Not on file   Social History Narrative    Not on file     Social Determinants of Health     Financial Resource Strain:     Difficulty of Paying Living Expenses: Not on file   Food Insecurity:     Worried About [address] in the Last Year: Not on file    [address] in the Last Year: Not on file   Transportation Needs:     Lack of Transportation (Medical): Not on file    Lack of Transportation (Non-Medical):  Not on file   Physical Activity:     Days of Exercise per Week: Not on file    Minutes of Exercise per Session: Not on file   Stress:     Feeling of Stress : Not on file   Social Connections:     Frequency of Communication with Friends and Family: Not on file    Frequency of Social Gatherings with Friends and Family: Not on file    Attends Mormonism Services: Not on file   Ardyth Moritz Active Member of Clubs or Organizations: Not on file    Attends Club or Organization Meetings: Not on file    Marital Status: Not on file   Intimate Partner Violence:     Fear of Current or Ex-Partner: Not on file    Emotionally Abused: Not on file    Physically Abused: Not on file    Sexually Abused: Not on file   Housing Stability:     Unable to Pay for Housing in the Last Year: Not on file    Number of [place] in the Last Year: Not on file    Unstable Housing in the Last Year: Not on file      Bactrim [sulfamethoxazole-trimethoprim], Penicillins, Prednisone, and Vancomycin   Medication list reviewed with Facility Wound Nurse    Objective:     PHYSICAL EXAM    Constitutional:   Well nourished and well developed. Appears neat and clean. Patient is alert, oriented x3, and in no apparent distress. Respiratory:  Respiratory effort is easy and symmetric bilaterally. Rate is normal at rest and on room air. Vascular:  Pedal Pulses not palpable likely secondary to edema 3+/lymphedema, audible signal with doppler. Capillary refill is <5 sec to digits bilateral.  Extremities positive for pitting edema/bilateral lymphedema. Neurological:  Gross and Light touch intact. Protective sensation diminished but present. Dermatological:  Wound description noted in wound assessment. Right lateral leg wound with biofilm present and dried crusted drainage at wound edge, will debride. The left knee wound bed is pink, granular, wound edge from 12:00 to 2:00  Epibole is  Improved- wound margins showing epithelialization. Moderate clear dried tan crusted drainage present on wound bed. Will debride. Psychiatric:  Judgement and insight intact. Short and long term memory intact. No evidence of depression-less anxious this week. In better spirits in general. Needs less frequent re-assurance and explanation of what is being done every few minutes then previously. Assessment and Plan:      1.  Abscess of left knee  Continue to monitor for recurrence of sx and if noted since IV antibiotics now completed, advise infectious disease as soon as noted. Dressing changes each M-W-F using hydrofera blue (moistened with saline)  -cover with dsd, then mepilex. Liquid skin prep to sushila wound before dressing. 2. Venous insufficiency rightlower extremities - with ulcerations  Control venous insufficiency drainage to surrounding areas of skin/to prevent maceration. Keep dressing to right lateral leg  changed every other day using hydrofera blue, wrap with kerlix, and keep ace wraps on bilateral lower extremities. Encourage short but frequent periods of ambulation to increase bloodflow to wound areas, but when finished, elevate legs to reduce edema. 3. Lymphedema due to venous insufficiency  Keep legs elevated and light compression (ace wraps) on bilateral lower legs, to increase venous return and decrease edema to promote healing of the venous insufficiency wounds. Patient has information on lymphedema pumps which she will need at discharge, review plan of care with her, so she is well informed on the importance of edema control to manage the ulcerations. Wound Care Documentation      Procedure Note  Indications:  Based on my examination of this patient's wound(s)/ulcer(s) today, debridement is required to promote healing and evaluate the extent healing. Performed by: Mayito Glover, BRAD - NP    Consent obtained:  Yes    Time out taken:  Yes    Pain Control:   none required       Debridement:Excisional Debridement    Using curette the wound(s)/ulcer(s) was/were sharply debrided down through and including the removal of epidermis, dermis and subcutaneous tissue. Devitalized Tissue Debrided:  fibrin, biofilm and slough    Pre Debridement Measurements:  Are located in the Wound/Ulcer Documentation Flow Sheet    Wound/Ulcer #: left knee wound debrided 100% for 8.8 sq cm.                                Right leg wound debrided 100% for 5.28 sq cm. Total sq cm. Debrided-  14.16 sq cm. Post Debridement Measurements:  Same as pre-debridement    Total Surface Area Debrided: 14.16 sq cm     Diabetic/Pressure/Non Pressure Ulcers:  Ulcer: N/A      Bleeding:  Minimal    Hemostasis Achieved:  not needed    Procedural Pain:  0  / 10     Post Procedural Pain:  0 / 10     Response to treatment:  Well tolerated by patient. Negative Pressure Wound Therapy Knee Anterior; Left (Active)   Number of days: 49       Wound 03/14/22 Pelvis Left open area skin fold: Moisture Associated Skin Damage (MASD) (Active)   Number of days: 50       Wound 03/14/22 Leg Right;Distal;Lateral (Active)   Number of days: 51       Wound 03/14/22 Leg Left;Distal;Lateral (Active)   Number of days: 51     Incision 01/07/19 Perineum Left (Active)   Number of days: 1213       Incision 03/16/22 Knee Anterior; Left (Active)   Number of days: 49       Procedural Pain: 0  / 10     Post Procedural Pain: 0 / 10     Response to treatment: Well tolerated by patient.        left knee wound               Wound Properties  left knee wound    Wound  non-healing post op- I & D    Dressing Status Change each M-W-F   Dressing Changed Wednesday    Dressing/treatment Cleanse with saline, cut hydrofera blue to fit wound bed, hydrate with sterile saline prior to inserting into wound bed, liquid skin prep the sushila wound, then 4 x 4s over then secure with mepilex border    Wound Cleansed Saline    Dressing Change Due Friday    Wound Length (cm) pre-debridement 1.2   Wound Width (cm) pre-debridement 7.4   Wound depth (cm) pre-debridement 0.5   Wound length  (cm) post debridement 1.2   Wound width (cm) post debridement 7.4   Wound depth (cm) post debridement 0.5   Drainage Amount Small     Drainage Description Sero sanguinous    Odor None    Margins  open      Exposed Structure None    Sushila-wound Assessment Intact-non-erythematous    Pink% Wound Bed    Yellow% wound bed  30   Red% Wound Bed 70   Culture Taken No        Right lateral leg wound           Wound Properties  right lateral leg wound    Wound Venous insufficiency - lymphedema    Dressing Status Change each M-W-F   Dressing Changed Wednesday    Dressing/treatment Cleanse with saline- mupirocin to wound bed, then hydrofera blue over  - secure with mepilex border    Wound Cleansed Saline    Dressing Change Due Friday    Wound Length (cm) pre-debridement 2.4   Wound Width (cm) pre-debridement 2.2   Wound Depth (cm) pre-debridement 0.3   Wound length (cm) post debridement 2.4   Wound width (cm) post debridement 2.2   Wound depth (cm) post debridement 0.3   Drainage Amount Small     Drainage Description Sero sanguinous    Odor None    Margins Poorly defined   Exposed Structure None    Sushila-wound Assessment Dry scaly    Pink% Wound Bed 50   Yellow% wound bed  50   Red% Wound Bed    Culture Taken No        Plan/Orders:  Knee dressing: Cleanse wound bed well with saline, and insert saline moistened hydrofera blue cut to fit the wound, with 4 x 4 gauze over. Liquid skin prep the intact surrounding sushila wound and when dry, cover with 4 x 4s, then cover with mepilex border dressing. To the bilateral lower legs, cleanse M-W-F with saline, then apply lac hydrin lotion while legs area still moist (applying to intact areas of skin only). To right lateral leg wound area: Each M-W-F, cleanse with saline and apply thin layer of mupirocin ointment, then hydrofera blue over wound areas and change every other day. Secure with ABD's and kerlix. May then apply bilateral lower leg ace wraps, starting proximal to toes, and ending just below the knees. Keep legs elevated on pillows to allow heels to float/remain off the bed at all times.      Shar Urbano, BRAD - NP

## 2022-05-11 ENCOUNTER — OFFICE VISIT (OUTPATIENT)
Dept: WOUND CARE | Age: 62
End: 2022-05-11
Payer: MEDICAID

## 2022-05-11 DIAGNOSIS — I87.339 STASIS DERMATITIS WITH VENOUS ULCER OF LOWER EXTREMITY DUE TO CHRONIC PERIPHERAL VENOUS HYPERTENSION (HCC): ICD-10-CM

## 2022-05-11 DIAGNOSIS — I87.2 LYMPHEDEMA DUE TO VENOUS INSUFFICIENCY: ICD-10-CM

## 2022-05-11 DIAGNOSIS — I87.2 VENOUS INSUFFICIENCY OF BOTH LOWER EXTREMITIES: ICD-10-CM

## 2022-05-11 DIAGNOSIS — S81.802D WOUND OF LEFT LEG, SUBSEQUENT ENCOUNTER: ICD-10-CM

## 2022-05-11 DIAGNOSIS — L97.909 STASIS DERMATITIS WITH VENOUS ULCER OF LOWER EXTREMITY DUE TO CHRONIC PERIPHERAL VENOUS HYPERTENSION (HCC): ICD-10-CM

## 2022-05-11 DIAGNOSIS — I89.0 LYMPHEDEMA DUE TO VENOUS INSUFFICIENCY: ICD-10-CM

## 2022-05-11 DIAGNOSIS — S81.801D WOUND OF RIGHT LEG, SUBSEQUENT ENCOUNTER: ICD-10-CM

## 2022-05-11 DIAGNOSIS — L02.416 ABSCESS OF SKIN OF LEFT KNEE: Primary | ICD-10-CM

## 2022-05-11 PROCEDURE — 11042 DBRDMT SUBQ TIS 1ST 20SQCM/<: CPT | Performed by: NURSE PRACTITIONER

## 2022-05-11 PROCEDURE — 99308 SBSQ NF CARE LOW MDM 20: CPT | Performed by: NURSE PRACTITIONER

## 2022-05-11 NOTE — PROGRESS NOTES
[facility] Progress Note   Debridement Note    Name: Jenny Sepulveda   Date: 5/11/2022    Subjective:     HISTORY of PRESENT ILLNESS HPI     Jenny Sepulveda is a 62 y.o. female who presents today for wound/ulcer evaluation. History of Wound Context: Patient presented from home to the ER with reported \"redness of left leg\". Patient states started as small pimple 2/24/22 - area became red and was treated by PCP with doxycycline x 10 days. States- after finishing the doxycycline the redness returned/increased with purulent drainage coming from area. Admitted for management of cellulitis, duplex u/s of area showed complex fluid accumulation - concerning for abscess. Dr. Larry Crockett performed an I & D- post op applied wound vac. C & S of wound drainage on 3/16/22 + for Pasteurella multocida and Pseudomonas aeruginosa. Infectious disease change antibiotic to meropenem-as recommended per culture to be continued per PICC line x 2 weeks (from discharge 3/14/22). Today, the left knee wound is clean/fibrotic/granular base, no periwound erythema or purulence. Epibole improved, epthelialization occurring at wound edges. Small remaining area of left knee wound remains open, granulation present. Needs debrided. The right lateral leg wound is covered with clear tan/drainage surrounding wound margins-will debride. Planning discharge to home with h.h. follow up. Will need follow up in [address] [facility] by virtual visit because patient is homebound. Wound/Ulcer Pain Timing/Severity: intermittent  Quality of pain: aching, tender  Severity: 0 / 10   Modifying Factors: Pain worsens with care and subsides following completion of.    Associated Signs/Symptoms: edema, drainage and pain    Ulcer Identification:  Ulcer Type: venous and non-healing/non-surgical  Contributing Factors: edema, venous stasis, lymphedema, diabetes, poor glucose control, decreased mobility, shear force and obesity    Wound: Nonhealing surgical due to infection    Past Medical History:   Diagnosis Date    Bipolar depression ([facility] [address]     Depression     Diabetes mellitus ([facility]     Hypertension     PTSD (post-traumatic stress disorder)     Thyroid disease      Past Surgical History:   Procedure Laterality Date    BREAST LUMPECTOMY Right     CERVICAL LAMINECTOMY      C2-C6    INCISION AND DRAINAGE Left 1/7/2019    INCISION AND DRAINAGE LEFT PERIRECTAL  ABSCESS performed by Namrata Anguiano MD at [address]      WOUND EXPLORATION Left 3/16/2022    INCISION AND DRAINAGE OF LEFT LEG ABSCESS WITH WOUND VAC PLACEMENT performed by Namrata Anguiano MD at [address] History   Problem Relation Age of Onset    Bipolar Disorder Mother     Bipolar Disorder Sister     Bipolar Disorder Brother      Social History     Socioeconomic History    Marital status:      Spouse name: Not on file    Number of children: Not on file    Years of education: Not on file    Highest education level: Not on file   Occupational History    Not on file   Tobacco Use    Smoking status: Never Smoker    Smokeless tobacco: Never Used   Substance and Sexual Activity    Alcohol use: Yes     Comment: occassionally    Drug use: No    Sexual activity: Not on file   Other Topics Concern    Not on file   Social History Narrative    Not on file     Social Determinants of Health     Financial Resource Strain:     Difficulty of Paying Living Expenses: Not on file   Food Insecurity:     Worried About [address] in the Last Year: Not on file    [address] in the Last Year: Not on file   Transportation Needs:     Lack of Transportation (Medical): Not on file    Lack of Transportation (Non-Medical):  Not on file   Physical Activity:     Days of Exercise per Week: Not on file    Minutes of Exercise per Session: Not on file   Stress:     Feeling of Stress : Not on file   Social Connections:     Frequency of Communication with Friends and Family: Not on file    Frequency of Social Gatherings with Friends and Family: Not on file    Attends Jew Services: Not on file    Active Member of Clubs or Organizations: Not on file    Attends Club or Organization Meetings: Not on file    Marital Status: Not on file   Intimate Partner Violence:     Fear of Current or Ex-Partner: Not on file    Emotionally Abused: Not on file    Physically Abused: Not on file    Sexually Abused: Not on file   Housing Stability:     Unable to Pay for Housing in the Last Year: Not on file    Number of [place] in the Last Year: Not on file    Unstable Housing in the Last Year: Not on file      Bactrim [sulfamethoxazole-trimethoprim], Penicillins, Prednisone, and Vancomycin   Medication list reviewed with Facility Wound Nurse    Objective:     PHYSICAL EXAM    Constitutional:   Well nourished and well developed. Appears neat and clean. Patient is alert, oriented x3, and in no apparent distress. Respiratory:  Respiratory effort is easy and symmetric bilaterally. Rate is normal at rest and on room air. Vascular:  Pedal Pulses not palpable likely secondary to edema 3+/lymphedema, audible signal with doppler. Capillary refill is <5 sec to digits bilateral.  Extremities positive for pitting edema/bilateral lymphedema. Neurological:  Gross and Light touch intact. Protective sensation diminished but present. Dermatological:  Wound description noted in wound assessment. Right lateral leg wound with biofilm present and dried crusted drainage at wound edge, will debride. The left knee wound bed is pink, granular. Knee wound iImproved- wound margins showing epithelialization. Moderate clear dried tan crusted drainage present on wound bed. Will debride. Psychiatric:  Judgement and insight intact. Short and long term memory intact. No evidence of depression-less anxious this week.  In better spirits in general. Needs less frequent re-assurance and explanation of what is being done every few minutes then previously. Assessment and Plan:      1. Abscess of left knee  Continue to monitor for recurrence of sx and if noted since IV antibiotics now completed, advise infectious disease as soon as noted. Dressing changes each M-W-F using mupirocin to wound bed, then hydrofera blue (moistened with saline)  -cover with dsd, then mepilex. Liquid skin prep to sushila wound before dressing. 2. Venous insufficiency rightlower extremities - with ulcerations  Control venous insufficiency drainage to surrounding areas of skin/to prevent maceration. Keep dressing to right lateral leg  changed every other day using very thin layer of mupirocin ointment, then hydrofera blue, wrap with kerlix, and keep ace wraps on bilateral lower extremities. Encourage short but frequent periods of ambulation to increase bloodflow to wound areas, but when finished, elevate legs to reduce edema. 3. Lymphedema due to venous insufficiency  Keep legs elevated and light compression (ace wraps) on bilateral lower legs, to increase venous return and decrease edema to promote healing of the venous insufficiency wounds. Patient has information on lymphedema pumps which she will need at discharge, review plan of care with her, so she is well informed on the importance of edema control to manage the ulcerations. Wound Care Documentation      Procedure Note  Indications:  Based on my examination of this patient's wound(s)/ulcer(s) today, debridement is required to promote healing and evaluate the extent healing. Performed by: BRAD Jordan NP    Consent obtained:  Yes    Time out taken:  Yes    Pain Control:   none required       Debridement:Excisional Debridement    Using curette the wound(s)/ulcer(s) was/were sharply debrided down through and including the removal of epidermis, dermis and subcutaneous tissue.         Devitalized Tissue Debrided:  fibrin, biofilm and slough    Pre Debridement Measurements:  Are located in the Wound/Ulcer Documentation Flow Sheet    Wound/Ulcer #: left knee wound debrided 100% for 6.2 sq cm. Right leg wound debrided 100% for 1.2 sq cm. Total sq cm. Debrided-  7.4sq cm. Post Debridement Measurements:  Same as pre-debridement    Total Surface Area Debrided: 7.4  sq cm     Diabetic/Pressure/Non Pressure Ulcers:  Ulcer: N/A      Bleeding:  Minimal    Hemostasis Achieved:  not needed    Procedural Pain:  0  / 10     Post Procedural Pain:  0 / 10     Response to treatment:  Well tolerated by patient. Negative Pressure Wound Therapy Knee Anterior; Left (Active)   Number of days: 55       Wound 03/14/22 Pelvis Left open area skin fold: Moisture Associated Skin Damage (MASD) (Active)   Number of days: 57       Wound 03/14/22 Leg Right;Distal;Lateral (Active)   Number of days: 58       Wound 03/14/22 Leg Left;Distal;Lateral (Active)   Number of days: 58     Incision 01/07/19 Perineum Left (Active)   Number of days: 1220       Incision 03/16/22 Knee Anterior; Left (Active)   Number of days: 56       Procedural Pain: 0  / 10     Post Procedural Pain: 0 / 10     Response to treatment: Well tolerated by patient.        left knee wound   5/11/22                    Wound Properties  left knee wound    Wound  non-healing post op- I & D    Dressing Status Change each M-W-F   Dressing Changed Wednesday    Dressing/treatment Cleanse with saline, cut hydrofera blue to fit wound bed, hydrate with sterile saline prior to inserting into wound bed, liquid skin prep the sushila wound, then 4 x 4s over then secure with mepilex border    Wound Cleansed Saline    Dressing Change Due Friday    Wound Length (cm) pre-debridement 1.0   Wound Width (cm) pre-debridement 6.2   Wound depth (cm) pre-debridement 0.2   Wound length  (cm) post debridement 1.0   Wound width (cm) post debridement 6.2 Wound depth (cm) post debridement 0.2   Drainage Amount Small     Drainage Description Sero sanguinous    Odor None    Margins  open      Exposed Structure None    Sushila-wound Assessment Intact-non-erythematous    Pink% Wound Bed    Yellow% wound bed  30   Pink % Wound Bed 70   Culture Taken No        Right lateral leg wound               Wound Properties  right lateral leg wound    Wound Venous insufficiency - lymphedema    Dressing Status Change each M-W-F   Dressing Changed Wednesday    Dressing/treatment Cleanse with saline- mupirocin to wound bed, then hydrofera blue over  - secure with mepilex border    Wound Cleansed Saline    Dressing Change Due Friday    Wound Length (cm) pre-debridement 1.0   Wound Width (cm) pre-debridement 1.2   Wound Depth (cm) pre-debridement 0.2   Wound length (cm) post debridement 1.0   Wound width (cm) post debridement 1.2   Wound depth (cm) post debridement 0.2   Drainage Amount Small     Drainage Description Sero sanguinous    Odor None    Margins Poorly defined   Exposed Structure None    Sushila-wound Assessment Dry scaly    Pink% Wound Bed 50   Yellow% wound bed  50   Red% Wound Bed    Culture Taken No        Plan/Orders:  Knee dressing: Cleanse wound bed well with saline, and insert saline moistened hydrofera blue cut to fit the wound, with 4 x 4 gauze over. Liquid skin prep the intact surrounding sushila wound and when dry, cover with 4 x 4s, then cover with mepilex border dressing. To the bilateral lower legs, cleanse M-W-F with saline, then apply lac hydrin lotion while legs area still moist (applying to intact areas of skin only). To right lateral leg wound area: Each M-W-F, cleanse with saline and apply thin layer of mupirocin ointment, then hydrofera blue over wound areas and change every other day. Secure with ABD's and kerlix. May then apply bilateral lower leg ace wraps, starting proximal to toes, and ending just below the knees.      Keep legs elevated on pillows to allow heels to float/remain off the bed at all times.      Willie Rodney, APRN - NP

## 2023-08-29 NOTE — PROGRESS NOTES
Patient seen and examined. Wound VAC functioning well. Continue wound care per wound nurse regarding VAC changes. Care coordinator discussing potential for inpatient rehab if qualifies. details… no gross abnormalities

## 2023-12-26 ENCOUNTER — APPOINTMENT (OUTPATIENT)
Dept: CT IMAGING | Age: 63
DRG: 720 | End: 2023-12-26
Payer: COMMERCIAL

## 2023-12-26 ENCOUNTER — HOSPITAL ENCOUNTER (INPATIENT)
Age: 63
LOS: 4 days | Discharge: HOME | DRG: 720 | End: 2023-12-30
Attending: EMERGENCY MEDICINE | Admitting: STUDENT IN AN ORGANIZED HEALTH CARE EDUCATION/TRAINING PROGRAM
Payer: COMMERCIAL

## 2023-12-26 ENCOUNTER — APPOINTMENT (OUTPATIENT)
Dept: GENERAL RADIOLOGY | Age: 63
DRG: 720 | End: 2023-12-26
Payer: COMMERCIAL

## 2023-12-26 DIAGNOSIS — R06.03 RESPIRATORY DISTRESS: Primary | ICD-10-CM

## 2023-12-26 DIAGNOSIS — R06.09 OTHER FORM OF DYSPNEA: ICD-10-CM

## 2023-12-26 DIAGNOSIS — J10.1 INFLUENZA A: ICD-10-CM

## 2023-12-26 PROBLEM — J96.01 ACUTE RESPIRATORY FAILURE WITH HYPOXIA (HCC): Status: ACTIVE | Noted: 2023-12-26

## 2023-12-26 LAB
ALBUMIN SERPL-MCNC: 4 G/DL (ref 3.5–4.6)
ALP SERPL-CCNC: 108 U/L (ref 40–130)
ALT SERPL-CCNC: 19 U/L (ref 0–33)
ANION GAP SERPL CALCULATED.3IONS-SCNC: 12 MEQ/L (ref 9–15)
AST SERPL-CCNC: 24 U/L (ref 0–35)
BASE EXCESS ARTERIAL: 0 (ref -3–3)
BASE EXCESS ARTERIAL: 1 (ref -3–3)
BASE EXCESS ARTERIAL: 3 (ref -3–3)
BASOPHILS # BLD: 0 K/UL (ref 0–0.2)
BASOPHILS NFR BLD: 0.2 %
BILIRUB SERPL-MCNC: 0.8 MG/DL (ref 0.2–0.7)
BNP BLD-MCNC: 465 PG/ML
BUN SERPL-MCNC: 17 MG/DL (ref 8–23)
CALCIUM IONIZED: 1.12 MMOL/L (ref 1.12–1.32)
CALCIUM IONIZED: 1.18 MMOL/L (ref 1.12–1.32)
CALCIUM IONIZED: 1.18 MMOL/L (ref 1.12–1.32)
CALCIUM SERPL-MCNC: 9.2 MG/DL (ref 8.5–9.9)
CHLORIDE SERPL-SCNC: 101 MEQ/L (ref 95–107)
CO2 SERPL-SCNC: 26 MEQ/L (ref 20–31)
CREAT SERPL-MCNC: 1.18 MG/DL (ref 0.5–0.9)
D DIMER PPP FEU-MCNC: 1.38 MG/L FEU (ref 0–0.5)
EOSINOPHIL # BLD: 0 K/UL (ref 0–0.7)
EOSINOPHIL NFR BLD: 0 %
ERYTHROCYTE [DISTWIDTH] IN BLOOD BY AUTOMATED COUNT: 16.2 % (ref 11.5–14.5)
GLOBULIN SER CALC-MCNC: 3.4 G/DL (ref 2.3–3.5)
GLUCOSE BLD-MCNC: 138 MG/DL (ref 70–99)
GLUCOSE BLD-MCNC: 160 MG/DL (ref 70–99)
GLUCOSE BLD-MCNC: 171 MG/DL (ref 70–99)
GLUCOSE BLD-MCNC: 183 MG/DL (ref 70–99)
GLUCOSE BLD-MCNC: 187 MG/DL (ref 70–99)
GLUCOSE SERPL-MCNC: 177 MG/DL (ref 70–99)
HCO3 ARTERIAL: 24.7 MMOL/L (ref 21–29)
HCO3 ARTERIAL: 26.1 MMOL/L (ref 21–29)
HCO3 ARTERIAL: 27.7 MMOL/L (ref 21–29)
HCT VFR BLD AUTO: 38 % (ref 36–48)
HCT VFR BLD AUTO: 39 % (ref 36–48)
HCT VFR BLD AUTO: 40.2 % (ref 37–47)
HCT VFR BLD AUTO: 41 % (ref 36–48)
HGB BLD CALC-MCNC: 12.8 GM/DL (ref 12–16)
HGB BLD CALC-MCNC: 13.4 GM/DL (ref 12–16)
HGB BLD CALC-MCNC: 13.8 GM/DL (ref 12–16)
HGB BLD-MCNC: 12.9 G/DL (ref 12–16)
INFLUENZA A BY PCR: POSITIVE
INFLUENZA B BY PCR: NEGATIVE
LACTATE: 0.6 MMOL/L (ref 0.4–2)
LACTATE: 0.87 MMOL/L (ref 0.4–2)
LACTATE: 1.33 MMOL/L (ref 0.4–2)
LIPASE SERPL-CCNC: 25 U/L (ref 12–95)
LYMPHOCYTES # BLD: 0.2 K/UL (ref 1–4.8)
LYMPHOCYTES NFR BLD: 2.2 %
MAGNESIUM SERPL-MCNC: 1.8 MG/DL (ref 1.7–2.4)
MCH RBC QN AUTO: 26.7 PG (ref 27–31.3)
MCHC RBC AUTO-ENTMCNC: 32.1 % (ref 33–37)
MCV RBC AUTO: 83.2 FL (ref 79.4–94.8)
MONOCYTES # BLD: 0.4 K/UL (ref 0.2–0.8)
MONOCYTES NFR BLD: 4.4 %
NEUTROPHILS # BLD: 9.1 K/UL (ref 1.4–6.5)
NEUTS SEG NFR BLD: 92.8 %
O2 SAT, ARTERIAL: 80 % (ref 93–100)
O2 SAT, ARTERIAL: 97 % (ref 93–100)
O2 SAT, ARTERIAL: 98 % (ref 93–100)
PCO2 ARTERIAL: 40 MM HG (ref 35–45)
PCO2 ARTERIAL: 42 MM HG (ref 35–45)
PCO2 ARTERIAL: 43 MM HG (ref 35–45)
PERFORMED ON: ABNORMAL
PH ARTERIAL: 7.4 (ref 7.35–7.45)
PH ARTERIAL: 7.4 (ref 7.35–7.45)
PH ARTERIAL: 7.42 (ref 7.35–7.45)
PLATELET # BLD AUTO: 201 K/UL (ref 130–400)
PO2 ARTERIAL: 44 MM HG (ref 75–108)
PO2 ARTERIAL: 87 MM HG (ref 75–108)
PO2 ARTERIAL: 99 MM HG (ref 75–108)
POC CHLORIDE: 105 MEQ/L (ref 99–110)
POC CHLORIDE: 106 MEQ/L (ref 99–110)
POC CHLORIDE: 107 MEQ/L (ref 99–110)
POC CREATININE: 0.9 MG/DL (ref 0.6–1.2)
POC CREATININE: 1 MG/DL (ref 0.6–1.2)
POC CREATININE: 1.1 MG/DL (ref 0.6–1.2)
POC FIO2: 40
POC SAMPLE TYPE: ABNORMAL
POTASSIUM SERPL-SCNC: 4.3 MEQ/L (ref 3.5–5.1)
POTASSIUM SERPL-SCNC: 4.3 MEQ/L (ref 3.5–5.1)
POTASSIUM SERPL-SCNC: 4.5 MEQ/L (ref 3.4–4.9)
POTASSIUM SERPL-SCNC: 4.6 MEQ/L (ref 3.5–5.1)
PROT SERPL-MCNC: 7.4 G/DL (ref 6.3–8)
RBC # BLD AUTO: 4.83 M/UL (ref 4.2–5.4)
SARS-COV-2 RDRP RESP QL NAA+PROBE: NOT DETECTED
SODIUM BLD-SCNC: 138 MEQ/L (ref 136–145)
SODIUM BLD-SCNC: 139 MEQ/L (ref 136–145)
SODIUM BLD-SCNC: 140 MEQ/L (ref 136–145)
SODIUM SERPL-SCNC: 139 MEQ/L (ref 135–144)
TCO2 ARTERIAL: 26 MMOL/L (ref 21–32)
TCO2 ARTERIAL: 27 MMOL/L (ref 21–32)
TCO2 ARTERIAL: 29 MMOL/L (ref 21–32)
WBC # BLD AUTO: 9.8 K/UL (ref 4.8–10.8)

## 2023-12-26 PROCEDURE — 96375 TX/PRO/DX INJ NEW DRUG ADDON: CPT

## 2023-12-26 PROCEDURE — 2580000003 HC RX 258: Performed by: STUDENT IN AN ORGANIZED HEALTH CARE EDUCATION/TRAINING PROGRAM

## 2023-12-26 PROCEDURE — 71275 CT ANGIOGRAPHY CHEST: CPT

## 2023-12-26 PROCEDURE — 82803 BLOOD GASES ANY COMBINATION: CPT

## 2023-12-26 PROCEDURE — 87502 INFLUENZA DNA AMP PROBE: CPT

## 2023-12-26 PROCEDURE — 6360000002 HC RX W HCPCS: Performed by: EMERGENCY MEDICINE

## 2023-12-26 PROCEDURE — 2580000003 HC RX 258: Performed by: EMERGENCY MEDICINE

## 2023-12-26 PROCEDURE — 84132 ASSAY OF SERUM POTASSIUM: CPT

## 2023-12-26 PROCEDURE — 84295 ASSAY OF SERUM SODIUM: CPT

## 2023-12-26 PROCEDURE — 80053 COMPREHEN METABOLIC PANEL: CPT

## 2023-12-26 PROCEDURE — 6360000002 HC RX W HCPCS: Performed by: STUDENT IN AN ORGANIZED HEALTH CARE EDUCATION/TRAINING PROGRAM

## 2023-12-26 PROCEDURE — 82565 ASSAY OF CREATININE: CPT

## 2023-12-26 PROCEDURE — 85014 HEMATOCRIT: CPT

## 2023-12-26 PROCEDURE — 6360000004 HC RX CONTRAST MEDICATION: Performed by: EMERGENCY MEDICINE

## 2023-12-26 PROCEDURE — 83605 ASSAY OF LACTIC ACID: CPT

## 2023-12-26 PROCEDURE — 83880 ASSAY OF NATRIURETIC PEPTIDE: CPT

## 2023-12-26 PROCEDURE — 99285 EMERGENCY DEPT VISIT HI MDM: CPT

## 2023-12-26 PROCEDURE — 94640 AIRWAY INHALATION TREATMENT: CPT

## 2023-12-26 PROCEDURE — 82435 ASSAY OF BLOOD CHLORIDE: CPT

## 2023-12-26 PROCEDURE — 83690 ASSAY OF LIPASE: CPT

## 2023-12-26 PROCEDURE — 36600 WITHDRAWAL OF ARTERIAL BLOOD: CPT

## 2023-12-26 PROCEDURE — 82330 ASSAY OF CALCIUM: CPT

## 2023-12-26 PROCEDURE — 2700000000 HC OXYGEN THERAPY PER DAY

## 2023-12-26 PROCEDURE — 85025 COMPLETE CBC W/AUTO DIFF WBC: CPT

## 2023-12-26 PROCEDURE — 94761 N-INVAS EAR/PLS OXIMETRY MLT: CPT

## 2023-12-26 PROCEDURE — 71045 X-RAY EXAM CHEST 1 VIEW: CPT

## 2023-12-26 PROCEDURE — 84145 PROCALCITONIN (PCT): CPT

## 2023-12-26 PROCEDURE — 85379 FIBRIN DEGRADATION QUANT: CPT

## 2023-12-26 PROCEDURE — 93005 ELECTROCARDIOGRAM TRACING: CPT | Performed by: EMERGENCY MEDICINE

## 2023-12-26 PROCEDURE — 6370000000 HC RX 637 (ALT 250 FOR IP): Performed by: STUDENT IN AN ORGANIZED HEALTH CARE EDUCATION/TRAINING PROGRAM

## 2023-12-26 PROCEDURE — 99291 CRITICAL CARE FIRST HOUR: CPT | Performed by: INTERNAL MEDICINE

## 2023-12-26 PROCEDURE — 96374 THER/PROPH/DIAG INJ IV PUSH: CPT

## 2023-12-26 PROCEDURE — 36415 COLL VENOUS BLD VENIPUNCTURE: CPT

## 2023-12-26 PROCEDURE — 6370000000 HC RX 637 (ALT 250 FOR IP)

## 2023-12-26 PROCEDURE — 87635 SARS-COV-2 COVID-19 AMP PRB: CPT

## 2023-12-26 PROCEDURE — 2000000000 HC ICU R&B

## 2023-12-26 PROCEDURE — 94660 CPAP INITIATION&MGMT: CPT

## 2023-12-26 PROCEDURE — 83735 ASSAY OF MAGNESIUM: CPT

## 2023-12-26 RX ORDER — HYDRALAZINE HYDROCHLORIDE 20 MG/ML
10 INJECTION INTRAMUSCULAR; INTRAVENOUS ONCE
Status: COMPLETED | OUTPATIENT
Start: 2023-12-27 | End: 2023-12-27

## 2023-12-26 RX ORDER — SODIUM CHLORIDE 9 MG/ML
INJECTION, SOLUTION INTRAVENOUS CONTINUOUS
Status: DISCONTINUED | OUTPATIENT
Start: 2023-12-26 | End: 2023-12-28

## 2023-12-26 RX ORDER — SODIUM CHLORIDE 0.9 % (FLUSH) 0.9 %
5-40 SYRINGE (ML) INJECTION PRN
Status: DISCONTINUED | OUTPATIENT
Start: 2023-12-26 | End: 2023-12-31 | Stop reason: HOSPADM

## 2023-12-26 RX ORDER — ACETAMINOPHEN 325 MG/1
650 TABLET ORAL 2 TIMES DAILY
COMMUNITY

## 2023-12-26 RX ORDER — SODIUM CHLORIDE 0.9 % (FLUSH) 0.9 %
5-40 SYRINGE (ML) INJECTION EVERY 12 HOURS SCHEDULED
Status: DISCONTINUED | OUTPATIENT
Start: 2023-12-26 | End: 2023-12-31 | Stop reason: HOSPADM

## 2023-12-26 RX ORDER — SODIUM CHLORIDE 9 MG/ML
INJECTION, SOLUTION INTRAVENOUS PRN
Status: DISCONTINUED | OUTPATIENT
Start: 2023-12-26 | End: 2023-12-31 | Stop reason: HOSPADM

## 2023-12-26 RX ORDER — MAGNESIUM SULFATE IN WATER 40 MG/ML
2000 INJECTION, SOLUTION INTRAVENOUS PRN
Status: DISCONTINUED | OUTPATIENT
Start: 2023-12-26 | End: 2023-12-31 | Stop reason: HOSPADM

## 2023-12-26 RX ORDER — OSELTAMIVIR PHOSPHATE 75 MG/1
75 CAPSULE ORAL 2 TIMES DAILY
Status: DISCONTINUED | OUTPATIENT
Start: 2023-12-26 | End: 2023-12-31 | Stop reason: HOSPADM

## 2023-12-26 RX ORDER — INSULIN LISPRO 100 [IU]/ML
0-8 INJECTION, SOLUTION INTRAVENOUS; SUBCUTANEOUS EVERY 4 HOURS
Status: DISCONTINUED | OUTPATIENT
Start: 2023-12-26 | End: 2023-12-27

## 2023-12-26 RX ORDER — POTASSIUM CHLORIDE 20 MEQ/1
40 TABLET, EXTENDED RELEASE ORAL PRN
Status: DISCONTINUED | OUTPATIENT
Start: 2023-12-26 | End: 2023-12-31 | Stop reason: HOSPADM

## 2023-12-26 RX ORDER — DEXTROSE MONOHYDRATE 100 MG/ML
INJECTION, SOLUTION INTRAVENOUS CONTINUOUS PRN
Status: DISCONTINUED | OUTPATIENT
Start: 2023-12-26 | End: 2023-12-31 | Stop reason: HOSPADM

## 2023-12-26 RX ORDER — HYDRALAZINE HYDROCHLORIDE 20 MG/ML
10 INJECTION INTRAMUSCULAR; INTRAVENOUS ONCE
Status: COMPLETED | OUTPATIENT
Start: 2023-12-26 | End: 2023-12-26

## 2023-12-26 RX ORDER — POTASSIUM CHLORIDE 7.45 MG/ML
10 INJECTION INTRAVENOUS PRN
Status: DISCONTINUED | OUTPATIENT
Start: 2023-12-26 | End: 2023-12-31 | Stop reason: HOSPADM

## 2023-12-26 RX ORDER — IPRATROPIUM BROMIDE AND ALBUTEROL SULFATE 2.5; .5 MG/3ML; MG/3ML
1 SOLUTION RESPIRATORY (INHALATION)
Status: DISCONTINUED | OUTPATIENT
Start: 2023-12-26 | End: 2023-12-28

## 2023-12-26 RX ORDER — IPRATROPIUM BROMIDE AND ALBUTEROL SULFATE 2.5; .5 MG/3ML; MG/3ML
SOLUTION RESPIRATORY (INHALATION)
Status: COMPLETED
Start: 2023-12-26 | End: 2023-12-26

## 2023-12-26 RX ORDER — LABETALOL HYDROCHLORIDE 5 MG/ML
10 INJECTION, SOLUTION INTRAVENOUS EVERY 4 HOURS PRN
Status: DISCONTINUED | OUTPATIENT
Start: 2023-12-26 | End: 2023-12-27

## 2023-12-26 RX ORDER — ONDANSETRON 2 MG/ML
4 INJECTION INTRAMUSCULAR; INTRAVENOUS EVERY 6 HOURS PRN
Status: DISCONTINUED | OUTPATIENT
Start: 2023-12-26 | End: 2023-12-31 | Stop reason: HOSPADM

## 2023-12-26 RX ORDER — ENOXAPARIN SODIUM 100 MG/ML
40 INJECTION SUBCUTANEOUS DAILY
Status: DISCONTINUED | OUTPATIENT
Start: 2023-12-26 | End: 2023-12-27

## 2023-12-26 RX ORDER — ONDANSETRON 4 MG/1
4 TABLET, ORALLY DISINTEGRATING ORAL EVERY 8 HOURS PRN
Status: DISCONTINUED | OUTPATIENT
Start: 2023-12-26 | End: 2023-12-31 | Stop reason: HOSPADM

## 2023-12-26 RX ORDER — ACETAMINOPHEN 325 MG/1
650 TABLET ORAL EVERY 6 HOURS PRN
Status: DISCONTINUED | OUTPATIENT
Start: 2023-12-26 | End: 2023-12-31 | Stop reason: HOSPADM

## 2023-12-26 RX ORDER — GLUCAGON 1 MG/ML
1 KIT INJECTION PRN
Status: DISCONTINUED | OUTPATIENT
Start: 2023-12-26 | End: 2023-12-31 | Stop reason: HOSPADM

## 2023-12-26 RX ORDER — POLYETHYLENE GLYCOL 3350 17 G/17G
17 POWDER, FOR SOLUTION ORAL DAILY PRN
Status: DISCONTINUED | OUTPATIENT
Start: 2023-12-26 | End: 2023-12-31 | Stop reason: HOSPADM

## 2023-12-26 RX ORDER — ACETAMINOPHEN 650 MG/1
650 SUPPOSITORY RECTAL EVERY 6 HOURS PRN
Status: DISCONTINUED | OUTPATIENT
Start: 2023-12-26 | End: 2023-12-31 | Stop reason: HOSPADM

## 2023-12-26 RX ORDER — MAGNESIUM SULFATE IN WATER 40 MG/ML
2000 INJECTION, SOLUTION INTRAVENOUS ONCE
Status: COMPLETED | OUTPATIENT
Start: 2023-12-26 | End: 2023-12-26

## 2023-12-26 RX ADMIN — IPRATROPIUM BROMIDE AND ALBUTEROL SULFATE 1 DOSE: 2.5; .5 SOLUTION RESPIRATORY (INHALATION) at 19:42

## 2023-12-26 RX ADMIN — MAGNESIUM SULFATE HEPTAHYDRATE 2000 MG: 40 INJECTION, SOLUTION INTRAVENOUS at 14:28

## 2023-12-26 RX ADMIN — OSELTAMIVIR PHOSPHATE 75 MG: 75 CAPSULE ORAL at 22:08

## 2023-12-26 RX ADMIN — ENOXAPARIN SODIUM 40 MG: 100 INJECTION SUBCUTANEOUS at 15:50

## 2023-12-26 RX ADMIN — ACETAMINOPHEN 650 MG: 325 TABLET ORAL at 18:06

## 2023-12-26 RX ADMIN — SODIUM CHLORIDE: 9 INJECTION, SOLUTION INTRAVENOUS at 14:30

## 2023-12-26 RX ADMIN — METHYLPREDNISOLONE SODIUM SUCCINATE 40 MG: 40 INJECTION, POWDER, FOR SOLUTION INTRAMUSCULAR; INTRAVENOUS at 15:48

## 2023-12-26 RX ADMIN — SODIUM CHLORIDE: 9 INJECTION, SOLUTION INTRAVENOUS at 17:57

## 2023-12-26 RX ADMIN — Medication 10 ML: at 22:08

## 2023-12-26 RX ADMIN — HYDRALAZINE HYDROCHLORIDE 10 MG: 20 INJECTION INTRAMUSCULAR; INTRAVENOUS at 13:51

## 2023-12-26 RX ADMIN — IOPAMIDOL 75 ML: 612 INJECTION, SOLUTION INTRAVENOUS at 13:10

## 2023-12-26 RX ADMIN — LABETALOL HYDROCHLORIDE 10 MG: 5 INJECTION, SOLUTION INTRAVENOUS at 17:59

## 2023-12-26 RX ADMIN — IPRATROPIUM BROMIDE AND ALBUTEROL SULFATE 3 ML: 2.5; .5 SOLUTION RESPIRATORY (INHALATION) at 14:06

## 2023-12-26 NOTE — ED PROVIDER NOTES
[facility] ICU  eMERGENCY dEPARTMENT eNCOUnter      Pt Name: Evie Crespo  MRN: [id number]  Birthdate 1960  Date of evaluation: 12/26/2023  Provider: Tree Jones MD    CHIEF COMPLAINT       Chief Complaint   Patient presents with    Shortness of Breath     Since jada sundeep         HISTORY OF PRESENT ILLNESS   (Location/Symptom, Timing/Onset,Context/Setting, Quality, Duration, Modifying Factors, Severity)  Note limiting factors.   Evie Crespo is a 63 y.o. female who presents to the emergency department with a history of shortness of breath.  Started yesterday.  Patient uses CPAP at night.  Patient states that she is very short of breath.  My conversation with the paramedics upon arrival to the ER showed that her vital signs her pulse oximeter reading at scene was 88%.  Patient denies any chest pain or abdominal pain.  Patient denies headache blurry double vision.  Patient has been having a cough and viral type syndrome with upper respiratory complaints runny nose.  Patient is not on blood thinners.  Patient does have swollen lower extremities but these are chronic according to the patient.  Patient states it was slow onset and just getting worse.  No other complaints or concerns at this time.    HPI    NursingNotes were reviewed.    REVIEW OF SYSTEMS    (2-9 systems for level 4, 10 or more for level 5)     Review of Systems   All other systems reviewed and are negative.      Except as noted above the remainder of the review of systems was reviewed and negative.       PAST MEDICAL HISTORY     Past Medical History:   Diagnosis Date    Bipolar depression (HCC)     Depression     Diabetes mellitus (HCC)     Hypertension     PTSD (post-traumatic stress disorder)     Thyroid disease          SURGICALHISTORY       Past Surgical History:   Procedure Laterality Date    BREAST LUMPECTOMY Right     CERVICAL LAMINECTOMY      C2-C6    INCISION AND DRAINAGE Left 1/7/2019    INCISION AND DRAINAGE LEFT PERIRECTAL  ABSCESS

## 2023-12-26 NOTE — H&P
DEPARTMENT OF HOSPITAL MEDICINE    HISTORY AND PHYSICAL EXAM    PATIENT NAME:  Evie Crespo    MRN:  [id number]  SERVICE DATE:  12/26/2023   SERVICE TIME:  4:00 PM    Primary Care Physician: Tommy Boyd     SUBJECTIVE  CHIEF COMPLAINT:  Shortness of Breath (Since christmas eve)       Shortness of Breath  Associated symptoms include leg swelling (chronic). Pertinent negatives include no abdominal pain, chest pain or fever.         Evie Crespo is a 63 y.o.,  female with PMH DEEP, morbid obesity, chronic lymphedema, T2DM, HTN  who  presents to the emergency department with chief complaint of Shortness of Breath (Since christmas sundeep)    Patient says she has experienced diarrhea and progressive dyspnea and cough over the last 2 days. She also reports recent dizziness/lightheadedness. She does not require home O2 and reports good compliance with home CPAP. Denies any history of COPD but does have a history of asthma. She was started on 6 L O2 via nasal cannula initially in ED, but hypoxia persisted and improved after placement on BIPAP.       The primary encounter diagnosis was Respiratory distress. Diagnoses of Other form of dyspnea and Influenza A were also pertinent to this visit.      PAST MEDICAL HISTORY:    Past Medical History:   Diagnosis Date    Bipolar depression (HCC)     Depression     Diabetes mellitus (HCC)     Hypertension     PTSD (post-traumatic stress disorder)     Thyroid disease       PAST SURGICAL HISTORY:    Past Surgical History:   Procedure Laterality Date    BREAST LUMPECTOMY Right     CERVICAL LAMINECTOMY      C2-C6    INCISION AND DRAINAGE Left 1/7/2019    INCISION AND DRAINAGE LEFT PERIRECTAL  ABSCESS performed by Miquel Cuello MD at [facility] [place]    LIPOMA RESECTION      ROTATOR CUFF REPAIR      WOUND EXPLORATION Left 3/16/2022    INCISION AND DRAINAGE OF LEFT LEG ABSCESS WITH WOUND VAC PLACEMENT performed by Miquel Cuello MD at [facility] [place]     FAMILY HISTORY:    Family History

## 2023-12-26 NOTE — CARE COORDINATION
Case Management Assessment  Initial Evaluation    Date/Time of Evaluation: 12/26/2023 3:53 PM  Assessment Completed by: Tawny Real RN    If patient is discharged prior to next notation, then this note serves as note for discharge by case management.    Patient Name: Evie Crespo                   YOB: 1960  Diagnosis: Acute respiratory failure with hypoxia (HCC) [J96.01]                   Date / Time: 12/26/2023 10:05 AM    Patient Admission Status: Inpatient   Readmission Risk (Low < 19, Mod (19-27), High > 27): No data recorded  Current PCP: Tommy Boyd  PCP verified by CM? (P) Yes    Chart Reviewed: Yes      History Provided by: (P) Patient  Patient Orientation: (P) Alert and Oriented, Person, Place, Situation    Patient Cognition: (P) Alert    Hospitalization in the last 30 days (Readmission):  No    If yes, Readmission Assessment in CM Navigator will be completed.    Advance Directives:      Code Status: Full Code   Patient's Primary Decision Maker is: (P) Legal Next of Kin (pt names her friend Bebeto Walden)      Discharge Planning:    Patient lives with: (P) Alone Type of Home: (P) House  Primary Care Giver: (P) Self  Patient Support Systems include: (P) Friends/Neighbors   Current Financial resources: (P) Medicaid  Current community resources: (P) ECF/Home Care  Current services prior to admission: (P) Home Care            Current DME:              Type of Home Care services:  (P) Aide Services    ADLS  Prior functional level: (P) Independent in ADLs/IADLs  Current functional level: (P) Independent in ADLs/IADLs, Other (see comment) (she has hha services through visiting devi.)    PT AM-PAC:   /24  OT AM-PAC:   /24    Family can provide assistance at DC: (P) Other (comment) (pt has hha services.)  Would you like Case Management to discuss the discharge plan with any other family members/significant others, and if so, who?    Plans to Return to Present Housing: (P) Yes  Other

## 2023-12-26 NOTE — CONSULTS
Inpatient consult to Critical Care  Consult performed by: Cindy Grant MD  Consult ordered by: Andrew Bustamante MD            Admit Date: 12/26/2023    PCP:  Tommy Boyd           CHIEF COMPLAINT / HPI:                The patient is a 63 y.o. female with significant past medical history of morbid obesity, diabetes, hypertension, obstructive sleep apnea on CPAP and thyroid disease who presented with worsening shortness of breath, cough and fever.  She was found to have influenza A.  She was severely hypoxic in the ER was placed on nonrebreather.  CTA chest reviewed personally and results interpreted independently.  She has groundglass opacities bilaterally.  She was started on BiPAP was transferred to the ICU.  Her respiratory rate is in the 30s.  She is feeling a bit better on the BiPAP.  Her oxygen saturation is 100%.  Stat ABGs repeated and her current pO2 is 86.       Past Medical History:      Diagnosis Date    Bipolar depression (HCC)     Depression     Diabetes mellitus (HCC)     Hypertension     PTSD (post-traumatic stress disorder)     Thyroid disease         Past Surgical History:        Procedure Laterality Date    BREAST LUMPECTOMY Right     CERVICAL LAMINECTOMY      C2-C6    INCISION AND DRAINAGE Left 1/7/2019    INCISION AND DRAINAGE LEFT PERIRECTAL  ABSCESS performed by Miquel Cuello MD at [facility] [place]    LIPOMA RESECTION      ROTATOR CUFF REPAIR      WOUND EXPLORATION Left 3/16/2022    INCISION AND DRAINAGE OF LEFT LEG ABSCESS WITH WOUND VAC PLACEMENT performed by Miquel Cuello MD at [facility] [place]       Current Medications:     sodium chloride flush  5-40 mL IntraVENous 2 times per day    enoxaparin  40 mg SubCUTAneous Daily    ipratropium 0.5 mg-albuterol 2.5 mg  1 Dose Inhalation Q4H WA RT    methylPREDNISolone  40 mg IntraVENous Daily    oseltamivir  75 mg Oral BID    insulin lispro  0-8 Units SubCUTAneous Q4H     Home Meds:  Prior to Admission medications    Medication Sig Start Date

## 2023-12-26 NOTE — ACP (ADVANCE CARE PLANNING)
Advance Care Planning     Advance Care Planning Activator (Inpatient)  Conversation Note      Date of ACP Conversation: 12/26/2023     Conversation Conducted with: Patient with Decision Making Capacity    ACP Activator: Tawny Real RN        Health Care Decision Maker:     Current Designated Health Care Decision Maker:     Primary Decision Maker: Bebeto Walden [facility] - [phone]

## 2023-12-26 NOTE — ED NOTES
On return from CT pt is 89% on 6L NC. Dr. Jones notified. Pt placed back onto BiPAP at this time.

## 2023-12-27 LAB
ANION GAP SERPL CALCULATED.3IONS-SCNC: 11 MEQ/L (ref 9–15)
BUN SERPL-MCNC: 16 MG/DL (ref 8–23)
CALCIUM SERPL-MCNC: 8.5 MG/DL (ref 8.5–9.9)
CHLORIDE SERPL-SCNC: 103 MEQ/L (ref 95–107)
CO2 SERPL-SCNC: 24 MEQ/L (ref 20–31)
CREAT SERPL-MCNC: 1.1 MG/DL (ref 0.5–0.9)
EKG ATRIAL RATE: 110 BPM
EKG P AXIS: 58 DEGREES
EKG P-R INTERVAL: 140 MS
EKG Q-T INTERVAL: 290 MS
EKG QRS DURATION: 64 MS
EKG QTC CALCULATION (BAZETT): 392 MS
EKG R AXIS: 62 DEGREES
EKG T AXIS: 164 DEGREES
EKG VENTRICULAR RATE: 110 BPM
ERYTHROCYTE [DISTWIDTH] IN BLOOD BY AUTOMATED COUNT: 16.4 % (ref 11.5–14.5)
GLUCOSE BLD-MCNC: 125 MG/DL (ref 70–99)
GLUCOSE BLD-MCNC: 135 MG/DL (ref 70–99)
GLUCOSE BLD-MCNC: 135 MG/DL (ref 70–99)
GLUCOSE BLD-MCNC: 164 MG/DL (ref 70–99)
GLUCOSE BLD-MCNC: 173 MG/DL (ref 70–99)
GLUCOSE BLD-MCNC: 188 MG/DL (ref 70–99)
GLUCOSE SERPL-MCNC: 116 MG/DL (ref 70–99)
HBA1C MFR BLD: 6.3 % (ref 4.8–5.9)
HCT VFR BLD AUTO: 37.5 % (ref 37–47)
HGB BLD-MCNC: 11.3 G/DL (ref 12–16)
MCH RBC QN AUTO: 25.4 PG (ref 27–31.3)
MCHC RBC AUTO-ENTMCNC: 30.1 % (ref 33–37)
MCV RBC AUTO: 84.3 FL (ref 79.4–94.8)
PERFORMED ON: ABNORMAL
PLATELET # BLD AUTO: 154 K/UL (ref 130–400)
POTASSIUM SERPL-SCNC: 4.3 MEQ/L (ref 3.4–4.9)
PROCALCITONIN SERPL IA-MCNC: 0.39 NG/ML (ref 0–0.15)
RBC # BLD AUTO: 4.45 M/UL (ref 4.2–5.4)
SODIUM SERPL-SCNC: 138 MEQ/L (ref 135–144)
WBC # BLD AUTO: 6.8 K/UL (ref 4.8–10.8)

## 2023-12-27 PROCEDURE — 6360000002 HC RX W HCPCS: Performed by: STUDENT IN AN ORGANIZED HEALTH CARE EDUCATION/TRAINING PROGRAM

## 2023-12-27 PROCEDURE — 80048 BASIC METABOLIC PNL TOTAL CA: CPT

## 2023-12-27 PROCEDURE — 99291 CRITICAL CARE FIRST HOUR: CPT | Performed by: INTERNAL MEDICINE

## 2023-12-27 PROCEDURE — 85027 COMPLETE CBC AUTOMATED: CPT

## 2023-12-27 PROCEDURE — 2500000003 HC RX 250 WO HCPCS: Performed by: NURSE PRACTITIONER

## 2023-12-27 PROCEDURE — 6370000000 HC RX 637 (ALT 250 FOR IP): Performed by: STUDENT IN AN ORGANIZED HEALTH CARE EDUCATION/TRAINING PROGRAM

## 2023-12-27 PROCEDURE — 94640 AIRWAY INHALATION TREATMENT: CPT

## 2023-12-27 PROCEDURE — 6370000000 HC RX 637 (ALT 250 FOR IP): Performed by: INTERNAL MEDICINE

## 2023-12-27 PROCEDURE — 2580000003 HC RX 258: Performed by: STUDENT IN AN ORGANIZED HEALTH CARE EDUCATION/TRAINING PROGRAM

## 2023-12-27 PROCEDURE — 36415 COLL VENOUS BLD VENIPUNCTURE: CPT

## 2023-12-27 PROCEDURE — 94761 N-INVAS EAR/PLS OXIMETRY MLT: CPT

## 2023-12-27 PROCEDURE — 2000000000 HC ICU R&B

## 2023-12-27 PROCEDURE — 6360000002 HC RX W HCPCS: Performed by: INTERNAL MEDICINE

## 2023-12-27 PROCEDURE — 2580000003 HC RX 258: Performed by: EMERGENCY MEDICINE

## 2023-12-27 PROCEDURE — 93010 ELECTROCARDIOGRAM REPORT: CPT | Performed by: INTERNAL MEDICINE

## 2023-12-27 PROCEDURE — 2700000000 HC OXYGEN THERAPY PER DAY

## 2023-12-27 PROCEDURE — 83036 HEMOGLOBIN GLYCOSYLATED A1C: CPT

## 2023-12-27 PROCEDURE — 94660 CPAP INITIATION&MGMT: CPT

## 2023-12-27 PROCEDURE — 6360000002 HC RX W HCPCS: Performed by: FAMILY MEDICINE

## 2023-12-27 RX ORDER — ENOXAPARIN SODIUM 100 MG/ML
40 INJECTION SUBCUTANEOUS 2 TIMES DAILY
Status: DISCONTINUED | OUTPATIENT
Start: 2023-12-27 | End: 2023-12-31 | Stop reason: HOSPADM

## 2023-12-27 RX ORDER — AMLODIPINE BESYLATE 5 MG/1
5 TABLET ORAL DAILY
COMMUNITY
Start: 2023-11-28

## 2023-12-27 RX ORDER — BUSPIRONE HYDROCHLORIDE 10 MG/1
10 TABLET ORAL 2 TIMES DAILY
Status: DISCONTINUED | OUTPATIENT
Start: 2023-12-27 | End: 2023-12-31 | Stop reason: HOSPADM

## 2023-12-27 RX ORDER — LISINOPRIL 10 MG/1
10 TABLET ORAL 2 TIMES DAILY
Status: DISCONTINUED | OUTPATIENT
Start: 2023-12-27 | End: 2023-12-28

## 2023-12-27 RX ORDER — AMLODIPINE BESYLATE 5 MG/1
5 TABLET ORAL DAILY
Status: DISCONTINUED | OUTPATIENT
Start: 2023-12-27 | End: 2023-12-28

## 2023-12-27 RX ORDER — GUAIFENESIN 600 MG/1
600 TABLET, EXTENDED RELEASE ORAL 2 TIMES DAILY
Status: DISCONTINUED | OUTPATIENT
Start: 2023-12-27 | End: 2023-12-31 | Stop reason: HOSPADM

## 2023-12-27 RX ORDER — LABETALOL HYDROCHLORIDE 5 MG/ML
10 INJECTION, SOLUTION INTRAVENOUS EVERY 4 HOURS PRN
Status: DISCONTINUED | OUTPATIENT
Start: 2023-12-27 | End: 2023-12-31 | Stop reason: HOSPADM

## 2023-12-27 RX ORDER — BUSPIRONE HYDROCHLORIDE 10 MG/1
10 TABLET ORAL 2 TIMES DAILY
COMMUNITY
Start: 2023-12-05

## 2023-12-27 RX ORDER — INSULIN LISPRO 100 [IU]/ML
0-8 INJECTION, SOLUTION INTRAVENOUS; SUBCUTANEOUS
Status: DISCONTINUED | OUTPATIENT
Start: 2023-12-27 | End: 2023-12-31 | Stop reason: HOSPADM

## 2023-12-27 RX ORDER — FUROSEMIDE 10 MG/ML
40 INJECTION INTRAMUSCULAR; INTRAVENOUS ONCE
Status: COMPLETED | OUTPATIENT
Start: 2023-12-27 | End: 2023-12-27

## 2023-12-27 RX ORDER — LEVOTHYROXINE SODIUM 88 UG/1
88 TABLET ORAL DAILY
Status: DISCONTINUED | OUTPATIENT
Start: 2023-12-27 | End: 2023-12-31 | Stop reason: HOSPADM

## 2023-12-27 RX ORDER — LEVOTHYROXINE SODIUM 88 UG/1
88 TABLET ORAL DAILY
COMMUNITY
Start: 2023-12-06

## 2023-12-27 RX ADMIN — OSELTAMIVIR PHOSPHATE 75 MG: 75 CAPSULE ORAL at 09:28

## 2023-12-27 RX ADMIN — BUSPIRONE HYDROCHLORIDE 10 MG: 5 TABLET ORAL at 09:31

## 2023-12-27 RX ADMIN — ACETAMINOPHEN 650 MG: 325 TABLET ORAL at 20:23

## 2023-12-27 RX ADMIN — LISINOPRIL 10 MG: 10 TABLET ORAL at 20:21

## 2023-12-27 RX ADMIN — AMLODIPINE BESYLATE 5 MG: 5 TABLET ORAL at 09:32

## 2023-12-27 RX ADMIN — Medication 10 ML: at 20:21

## 2023-12-27 RX ADMIN — ACETAMINOPHEN 650 MG: 325 TABLET ORAL at 10:40

## 2023-12-27 RX ADMIN — IPRATROPIUM BROMIDE AND ALBUTEROL SULFATE 1 DOSE: 2.5; .5 SOLUTION RESPIRATORY (INHALATION) at 03:46

## 2023-12-27 RX ADMIN — GUAIFENESIN 600 MG: 600 TABLET ORAL at 20:21

## 2023-12-27 RX ADMIN — HYDRALAZINE HYDROCHLORIDE 10 MG: 20 INJECTION INTRAMUSCULAR; INTRAVENOUS at 00:18

## 2023-12-27 RX ADMIN — IPRATROPIUM BROMIDE AND ALBUTEROL SULFATE 1 DOSE: 2.5; .5 SOLUTION RESPIRATORY (INHALATION) at 19:19

## 2023-12-27 RX ADMIN — ENOXAPARIN SODIUM 40 MG: 100 INJECTION SUBCUTANEOUS at 20:21

## 2023-12-27 RX ADMIN — OSELTAMIVIR PHOSPHATE 75 MG: 75 CAPSULE ORAL at 20:21

## 2023-12-27 RX ADMIN — FUROSEMIDE 40 MG: 10 INJECTION, SOLUTION INTRAMUSCULAR; INTRAVENOUS at 10:31

## 2023-12-27 RX ADMIN — GUAIFENESIN 600 MG: 600 TABLET ORAL at 10:31

## 2023-12-27 RX ADMIN — SODIUM CHLORIDE: 9 INJECTION, SOLUTION INTRAVENOUS at 15:03

## 2023-12-27 RX ADMIN — MICONAZOLE NITRATE: 2 POWDER TOPICAL at 20:26

## 2023-12-27 RX ADMIN — SODIUM CHLORIDE: 9 INJECTION, SOLUTION INTRAVENOUS at 04:51

## 2023-12-27 RX ADMIN — BUSPIRONE HYDROCHLORIDE 10 MG: 5 TABLET ORAL at 17:38

## 2023-12-27 RX ADMIN — METHYLPREDNISOLONE SODIUM SUCCINATE 40 MG: 40 INJECTION, POWDER, FOR SOLUTION INTRAMUSCULAR; INTRAVENOUS at 09:28

## 2023-12-27 RX ADMIN — LABETALOL HYDROCHLORIDE 10 MG: 5 INJECTION, SOLUTION INTRAVENOUS at 23:44

## 2023-12-27 RX ADMIN — ENOXAPARIN SODIUM 40 MG: 100 INJECTION SUBCUTANEOUS at 09:27

## 2023-12-27 RX ADMIN — MICONAZOLE NITRATE: 2 POWDER TOPICAL at 10:32

## 2023-12-27 RX ADMIN — IPRATROPIUM BROMIDE AND ALBUTEROL SULFATE 1 DOSE: 2.5; .5 SOLUTION RESPIRATORY (INHALATION) at 15:47

## 2023-12-27 RX ADMIN — LISINOPRIL 10 MG: 10 TABLET ORAL at 09:31

## 2023-12-27 RX ADMIN — IPRATROPIUM BROMIDE AND ALBUTEROL SULFATE 1 DOSE: 2.5; .5 SOLUTION RESPIRATORY (INHALATION) at 10:28

## 2023-12-27 RX ADMIN — ACETAMINOPHEN 650 MG: 325 TABLET ORAL at 00:13

## 2023-12-27 ASSESSMENT — PAIN - FUNCTIONAL ASSESSMENT: PAIN_FUNCTIONAL_ASSESSMENT: PREVENTS OR INTERFERES SOME ACTIVE ACTIVITIES AND ADLS

## 2023-12-27 ASSESSMENT — PAIN DESCRIPTION - DESCRIPTORS: DESCRIPTORS: ACHING

## 2023-12-27 ASSESSMENT — PAIN DESCRIPTION - PAIN TYPE: TYPE: CHRONIC PAIN

## 2023-12-27 ASSESSMENT — PAIN SCALES - GENERAL: PAINLEVEL_OUTOF10: 3

## 2023-12-27 ASSESSMENT — PAIN DESCRIPTION - FREQUENCY: FREQUENCY: CONTINUOUS

## 2023-12-27 ASSESSMENT — PAIN DESCRIPTION - ONSET: ONSET: ON-GOING

## 2023-12-27 ASSESSMENT — PAIN DESCRIPTION - LOCATION: LOCATION: BACK

## 2023-12-27 ASSESSMENT — PAIN DESCRIPTION - ORIENTATION: ORIENTATION: MID

## 2023-12-27 NOTE — CARE COORDINATION
AM TEAM QUALITY ICU ROUNDS AT BEDSIDE. NO FAMILY PRESENT. ON BIPAP CURRENTLY. C/O \"STRUGGLING WITH BREATHING.\" RECEIVING STEROIDS, AND TAMIFLU. FIO2 40, SPO2 99%. CM/LSW TO FOLLOW FOR NEEDS.   1100- MET W/PT AND STATING SHE IS FEELING A BIT BETTER. RECEIVING LASIX, MUCINEX AND HIGH FLOW O2. WILL FOLLOW. PT WOULD LIKE TO RETURN HOME W/CURRENT SERVICES; VISITING [facility] X WEEKLY. WILL FOLLOW.

## 2023-12-28 LAB
ANION GAP SERPL CALCULATED.3IONS-SCNC: 12 MEQ/L (ref 9–15)
BUN SERPL-MCNC: 24 MG/DL (ref 8–23)
CALCIUM SERPL-MCNC: 8.2 MG/DL (ref 8.5–9.9)
CHLORIDE SERPL-SCNC: 102 MEQ/L (ref 95–107)
CO2 SERPL-SCNC: 25 MEQ/L (ref 20–31)
CREAT SERPL-MCNC: 1.24 MG/DL (ref 0.5–0.9)
ERYTHROCYTE [DISTWIDTH] IN BLOOD BY AUTOMATED COUNT: 16 % (ref 11.5–14.5)
GLUCOSE BLD-MCNC: 118 MG/DL (ref 70–99)
GLUCOSE BLD-MCNC: 120 MG/DL (ref 70–99)
GLUCOSE BLD-MCNC: 130 MG/DL (ref 70–99)
GLUCOSE BLD-MCNC: 162 MG/DL (ref 70–99)
GLUCOSE SERPL-MCNC: 103 MG/DL (ref 70–99)
HCT VFR BLD AUTO: 40.2 % (ref 37–47)
HGB BLD-MCNC: 11.7 G/DL (ref 12–16)
MCH RBC QN AUTO: 24.7 PG (ref 27–31.3)
MCHC RBC AUTO-ENTMCNC: 29.1 % (ref 33–37)
MCV RBC AUTO: 84.8 FL (ref 79.4–94.8)
PERFORMED ON: ABNORMAL
PLATELET # BLD AUTO: 152 K/UL (ref 130–400)
POTASSIUM SERPL-SCNC: 4.3 MEQ/L (ref 3.4–4.9)
PROCALCITONIN SERPL IA-MCNC: 0.33 NG/ML (ref 0–0.15)
RBC # BLD AUTO: 4.74 M/UL (ref 4.2–5.4)
SODIUM SERPL-SCNC: 139 MEQ/L (ref 135–144)
WBC # BLD AUTO: 6.1 K/UL (ref 4.8–10.8)

## 2023-12-28 PROCEDURE — 36415 COLL VENOUS BLD VENIPUNCTURE: CPT

## 2023-12-28 PROCEDURE — 99232 SBSQ HOSP IP/OBS MODERATE 35: CPT | Performed by: INTERNAL MEDICINE

## 2023-12-28 PROCEDURE — 2580000003 HC RX 258: Performed by: STUDENT IN AN ORGANIZED HEALTH CARE EDUCATION/TRAINING PROGRAM

## 2023-12-28 PROCEDURE — 6360000002 HC RX W HCPCS: Performed by: STUDENT IN AN ORGANIZED HEALTH CARE EDUCATION/TRAINING PROGRAM

## 2023-12-28 PROCEDURE — 2060000000 HC ICU INTERMEDIATE R&B

## 2023-12-28 PROCEDURE — 94640 AIRWAY INHALATION TREATMENT: CPT

## 2023-12-28 PROCEDURE — 6370000000 HC RX 637 (ALT 250 FOR IP): Performed by: INTERNAL MEDICINE

## 2023-12-28 PROCEDURE — 6370000000 HC RX 637 (ALT 250 FOR IP): Performed by: STUDENT IN AN ORGANIZED HEALTH CARE EDUCATION/TRAINING PROGRAM

## 2023-12-28 PROCEDURE — 80048 BASIC METABOLIC PNL TOTAL CA: CPT

## 2023-12-28 PROCEDURE — 97167 OT EVAL HIGH COMPLEX 60 MIN: CPT

## 2023-12-28 PROCEDURE — 84145 PROCALCITONIN (PCT): CPT

## 2023-12-28 PROCEDURE — 85027 COMPLETE CBC AUTOMATED: CPT

## 2023-12-28 PROCEDURE — 94761 N-INVAS EAR/PLS OXIMETRY MLT: CPT

## 2023-12-28 PROCEDURE — 94660 CPAP INITIATION&MGMT: CPT

## 2023-12-28 PROCEDURE — 2580000003 HC RX 258: Performed by: EMERGENCY MEDICINE

## 2023-12-28 PROCEDURE — 2700000000 HC OXYGEN THERAPY PER DAY

## 2023-12-28 PROCEDURE — 97163 PT EVAL HIGH COMPLEX 45 MIN: CPT

## 2023-12-28 RX ORDER — IPRATROPIUM BROMIDE AND ALBUTEROL SULFATE 2.5; .5 MG/3ML; MG/3ML
1 SOLUTION RESPIRATORY (INHALATION) EVERY 4 HOURS PRN
Status: DISCONTINUED | OUTPATIENT
Start: 2023-12-28 | End: 2023-12-31 | Stop reason: HOSPADM

## 2023-12-28 RX ORDER — AMLODIPINE BESYLATE 10 MG/1
10 TABLET ORAL DAILY
Status: DISCONTINUED | OUTPATIENT
Start: 2023-12-28 | End: 2023-12-31 | Stop reason: HOSPADM

## 2023-12-28 RX ORDER — IPRATROPIUM BROMIDE AND ALBUTEROL SULFATE 2.5; .5 MG/3ML; MG/3ML
1 SOLUTION RESPIRATORY (INHALATION)
Status: DISCONTINUED | OUTPATIENT
Start: 2023-12-28 | End: 2023-12-31 | Stop reason: HOSPADM

## 2023-12-28 RX ORDER — LISINOPRIL 20 MG/1
20 TABLET ORAL 2 TIMES DAILY
Status: DISCONTINUED | OUTPATIENT
Start: 2023-12-28 | End: 2023-12-31 | Stop reason: HOSPADM

## 2023-12-28 RX ORDER — ALBUTEROL SULFATE 90 UG/1
2 AEROSOL, METERED RESPIRATORY (INHALATION) EVERY 6 HOURS PRN
Status: DISCONTINUED | OUTPATIENT
Start: 2023-12-28 | End: 2023-12-31 | Stop reason: HOSPADM

## 2023-12-28 RX ADMIN — AMLODIPINE BESYLATE 10 MG: 10 TABLET ORAL at 11:56

## 2023-12-28 RX ADMIN — GUAIFENESIN 600 MG: 600 TABLET ORAL at 11:56

## 2023-12-28 RX ADMIN — Medication 10 ML: at 11:46

## 2023-12-28 RX ADMIN — IPRATROPIUM BROMIDE AND ALBUTEROL SULFATE 1 DOSE: 2.5; .5 SOLUTION RESPIRATORY (INHALATION) at 00:01

## 2023-12-28 RX ADMIN — IPRATROPIUM BROMIDE AND ALBUTEROL SULFATE 1 DOSE: 2.5; .5 SOLUTION RESPIRATORY (INHALATION) at 07:51

## 2023-12-28 RX ADMIN — SODIUM CHLORIDE: 9 INJECTION, SOLUTION INTRAVENOUS at 03:11

## 2023-12-28 RX ADMIN — BUSPIRONE HYDROCHLORIDE 10 MG: 5 TABLET ORAL at 11:55

## 2023-12-28 RX ADMIN — METHYLPREDNISOLONE SODIUM SUCCINATE 40 MG: 40 INJECTION, POWDER, FOR SOLUTION INTRAMUSCULAR; INTRAVENOUS at 11:55

## 2023-12-28 RX ADMIN — IPRATROPIUM BROMIDE AND ALBUTEROL SULFATE 1 DOSE: 2.5; .5 SOLUTION RESPIRATORY (INHALATION) at 19:41

## 2023-12-28 RX ADMIN — LISINOPRIL 20 MG: 20 TABLET ORAL at 22:58

## 2023-12-28 RX ADMIN — BUSPIRONE HYDROCHLORIDE 10 MG: 5 TABLET ORAL at 17:39

## 2023-12-28 RX ADMIN — ENOXAPARIN SODIUM 40 MG: 100 INJECTION SUBCUTANEOUS at 22:58

## 2023-12-28 RX ADMIN — LISINOPRIL 20 MG: 20 TABLET ORAL at 11:56

## 2023-12-28 RX ADMIN — OSELTAMIVIR PHOSPHATE 75 MG: 75 CAPSULE ORAL at 12:03

## 2023-12-28 RX ADMIN — ENOXAPARIN SODIUM 40 MG: 100 INJECTION SUBCUTANEOUS at 11:55

## 2023-12-28 RX ADMIN — IPRATROPIUM BROMIDE AND ALBUTEROL SULFATE 1 DOSE: 2.5; .5 SOLUTION RESPIRATORY (INHALATION) at 17:01

## 2023-12-28 RX ADMIN — GUAIFENESIN 600 MG: 600 TABLET ORAL at 22:58

## 2023-12-28 RX ADMIN — MICONAZOLE NITRATE: 2 POWDER TOPICAL at 11:46

## 2023-12-28 RX ADMIN — LEVOTHYROXINE SODIUM 88 MCG: 88 TABLET ORAL at 12:03

## 2023-12-28 RX ADMIN — OSELTAMIVIR PHOSPHATE 75 MG: 75 CAPSULE ORAL at 22:58

## 2023-12-28 NOTE — CARE COORDINATION
PT REMAINS ON [facility], PT/OT ORDERED TO ASSIST WITH DC PLANNING.  PT IS FROM HOME WITH VISITING [facility] SERVICES.  PT MAY NEED SNF IF UNABLE TO WEAN O2.

## 2023-12-28 NOTE — RT PROTOCOL NOTE
RT Inhaler-Nebulizer Bronchodilator Protocol Note    There is a bronchodilator order in the chart from a provider indicating to follow the RT Bronchodilator Protocol and there is an “Initiate RT Inhaler-Nebulizer Bronchodilator Protocol” order as well (see protocol at bottom of note).    CXR Findings:  XR CHEST PORTABLE    Result Date: 12/26/2023  No acute process. Mild cardiomegaly.       The findings from the last RT Protocol Assessment were as follows:   History Pulmonary Disease: Chronic pulmonary disease  Respiratory Pattern: Regular pattern and RR 12-20 bpm  Breath Sounds: Intermittent or unilateral wheezes  Cough: Strong, spontaneous, non-productive  Indication for Bronchodilator Therapy: Wheezing associated with pulm disorder  Bronchodilator Assessment Score: 6    Aerosolized bronchodilator medication orders have been revised according to the RT Inhaler-Nebulizer Bronchodilator Protocol below.    Respiratory Therapist to perform RT Therapy Protocol Assessment initially then follow the protocol.  Repeat RT Therapy Protocol Assessment PRN for score 0-3 or on second treatment, BID, and PRN for scores above 3.    No Indications - adjust the frequency to every 6 hours PRN wheezing or bronchospasm, if no treatments needed after 48 hours then discontinue using Per Protocol order mode.     If indication present, adjust the RT bronchodilator orders based on the Bronchodilator Assessment Score as indicated below.  Use Inhaler orders unless patient has one or more of the following: on home nebulizer, not able to hold breath for 10 seconds, is not alert and oriented, cannot activate and use MDI correctly, or respiratory rate 25 breaths per minute or more, then use the equivalent nebulizer order(s) with same Frequency and PRN reasons based on the score.  If a patient is on this medication at home then do not decrease Frequency below that used at home.    0-3 - enter or revise RT bronchodilator order(s) to equivalent RT

## 2023-12-29 ENCOUNTER — APPOINTMENT (OUTPATIENT)
Dept: GENERAL RADIOLOGY | Age: 63
DRG: 720 | End: 2023-12-29
Payer: COMMERCIAL

## 2023-12-29 LAB
ANION GAP SERPL CALCULATED.3IONS-SCNC: 11 MEQ/L (ref 9–15)
BUN SERPL-MCNC: 26 MG/DL (ref 8–23)
CALCIUM SERPL-MCNC: 8.4 MG/DL (ref 8.5–9.9)
CHLORIDE SERPL-SCNC: 103 MEQ/L (ref 95–107)
CO2 SERPL-SCNC: 26 MEQ/L (ref 20–31)
CREAT SERPL-MCNC: 1.2 MG/DL (ref 0.5–0.9)
ERYTHROCYTE [DISTWIDTH] IN BLOOD BY AUTOMATED COUNT: 15.9 % (ref 11.5–14.5)
GLUCOSE BLD-MCNC: 111 MG/DL (ref 70–99)
GLUCOSE BLD-MCNC: 155 MG/DL (ref 70–99)
GLUCOSE BLD-MCNC: 242 MG/DL (ref 70–99)
GLUCOSE SERPL-MCNC: 103 MG/DL (ref 70–99)
HCT VFR BLD AUTO: 38.2 % (ref 37–47)
HGB BLD-MCNC: 11.2 G/DL (ref 12–16)
MCH RBC QN AUTO: 25.1 PG (ref 27–31.3)
MCHC RBC AUTO-ENTMCNC: 29.3 % (ref 33–37)
MCV RBC AUTO: 85.7 FL (ref 79.4–94.8)
PERFORMED ON: ABNORMAL
PLATELET # BLD AUTO: 168 K/UL (ref 130–400)
POTASSIUM SERPL-SCNC: 5.1 MEQ/L (ref 3.4–4.9)
RBC # BLD AUTO: 4.46 M/UL (ref 4.2–5.4)
SODIUM SERPL-SCNC: 140 MEQ/L (ref 135–144)
WBC # BLD AUTO: 6 K/UL (ref 4.8–10.8)

## 2023-12-29 PROCEDURE — 97116 GAIT TRAINING THERAPY: CPT

## 2023-12-29 PROCEDURE — 99233 SBSQ HOSP IP/OBS HIGH 50: CPT | Performed by: INTERNAL MEDICINE

## 2023-12-29 PROCEDURE — 6360000002 HC RX W HCPCS: Performed by: STUDENT IN AN ORGANIZED HEALTH CARE EDUCATION/TRAINING PROGRAM

## 2023-12-29 PROCEDURE — 36415 COLL VENOUS BLD VENIPUNCTURE: CPT

## 2023-12-29 PROCEDURE — 2700000000 HC OXYGEN THERAPY PER DAY

## 2023-12-29 PROCEDURE — 97535 SELF CARE MNGMENT TRAINING: CPT

## 2023-12-29 PROCEDURE — 71045 X-RAY EXAM CHEST 1 VIEW: CPT

## 2023-12-29 PROCEDURE — 6370000000 HC RX 637 (ALT 250 FOR IP): Performed by: STUDENT IN AN ORGANIZED HEALTH CARE EDUCATION/TRAINING PROGRAM

## 2023-12-29 PROCEDURE — 94660 CPAP INITIATION&MGMT: CPT

## 2023-12-29 PROCEDURE — 2580000003 HC RX 258: Performed by: STUDENT IN AN ORGANIZED HEALTH CARE EDUCATION/TRAINING PROGRAM

## 2023-12-29 PROCEDURE — 6370000000 HC RX 637 (ALT 250 FOR IP): Performed by: INTERNAL MEDICINE

## 2023-12-29 PROCEDURE — 6360000002 HC RX W HCPCS: Performed by: INTERNAL MEDICINE

## 2023-12-29 PROCEDURE — 94761 N-INVAS EAR/PLS OXIMETRY MLT: CPT

## 2023-12-29 PROCEDURE — 2060000000 HC ICU INTERMEDIATE R&B

## 2023-12-29 PROCEDURE — 85027 COMPLETE CBC AUTOMATED: CPT

## 2023-12-29 PROCEDURE — 94640 AIRWAY INHALATION TREATMENT: CPT

## 2023-12-29 PROCEDURE — 80048 BASIC METABOLIC PNL TOTAL CA: CPT

## 2023-12-29 RX ORDER — FUROSEMIDE 10 MG/ML
20 INJECTION INTRAMUSCULAR; INTRAVENOUS ONCE
Status: COMPLETED | OUTPATIENT
Start: 2023-12-29 | End: 2023-12-29

## 2023-12-29 RX ADMIN — ENOXAPARIN SODIUM 40 MG: 100 INJECTION SUBCUTANEOUS at 20:29

## 2023-12-29 RX ADMIN — LEVOTHYROXINE SODIUM 88 MCG: 88 TABLET ORAL at 08:36

## 2023-12-29 RX ADMIN — OSELTAMIVIR PHOSPHATE 75 MG: 75 CAPSULE ORAL at 08:36

## 2023-12-29 RX ADMIN — MICONAZOLE NITRATE: 2 POWDER TOPICAL at 08:44

## 2023-12-29 RX ADMIN — LISINOPRIL 20 MG: 20 TABLET ORAL at 20:29

## 2023-12-29 RX ADMIN — INSULIN LISPRO 2 UNITS: 100 INJECTION, SOLUTION INTRAVENOUS; SUBCUTANEOUS at 18:17

## 2023-12-29 RX ADMIN — GUAIFENESIN 600 MG: 600 TABLET ORAL at 08:36

## 2023-12-29 RX ADMIN — GUAIFENESIN 600 MG: 600 TABLET ORAL at 20:29

## 2023-12-29 RX ADMIN — Medication 10 ML: at 20:41

## 2023-12-29 RX ADMIN — BUSPIRONE HYDROCHLORIDE 10 MG: 5 TABLET ORAL at 08:36

## 2023-12-29 RX ADMIN — OSELTAMIVIR PHOSPHATE 75 MG: 75 CAPSULE ORAL at 20:39

## 2023-12-29 RX ADMIN — FUROSEMIDE 20 MG: 10 INJECTION, SOLUTION INTRAMUSCULAR; INTRAVENOUS at 15:31

## 2023-12-29 RX ADMIN — IPRATROPIUM BROMIDE AND ALBUTEROL SULFATE 1 DOSE: 2.5; .5 SOLUTION RESPIRATORY (INHALATION) at 09:35

## 2023-12-29 RX ADMIN — ACETAMINOPHEN 650 MG: 325 TABLET ORAL at 01:29

## 2023-12-29 RX ADMIN — AMLODIPINE BESYLATE 10 MG: 10 TABLET ORAL at 08:36

## 2023-12-29 RX ADMIN — IPRATROPIUM BROMIDE AND ALBUTEROL SULFATE 1 DOSE: 2.5; .5 SOLUTION RESPIRATORY (INHALATION) at 20:10

## 2023-12-29 RX ADMIN — Medication 10 ML: at 08:44

## 2023-12-29 RX ADMIN — ACETAMINOPHEN 650 MG: 325 TABLET ORAL at 20:39

## 2023-12-29 RX ADMIN — METHYLPREDNISOLONE SODIUM SUCCINATE 40 MG: 40 INJECTION, POWDER, FOR SOLUTION INTRAMUSCULAR; INTRAVENOUS at 08:35

## 2023-12-29 RX ADMIN — ENOXAPARIN SODIUM 40 MG: 100 INJECTION SUBCUTANEOUS at 08:36

## 2023-12-29 RX ADMIN — MICONAZOLE NITRATE: 2 POWDER TOPICAL at 20:42

## 2023-12-29 RX ADMIN — LISINOPRIL 20 MG: 20 TABLET ORAL at 08:36

## 2023-12-29 RX ADMIN — BUSPIRONE HYDROCHLORIDE 10 MG: 5 TABLET ORAL at 15:45

## 2023-12-29 ASSESSMENT — PAIN DESCRIPTION - LOCATION: LOCATION: BACK;NECK

## 2023-12-29 ASSESSMENT — PAIN SCALES - GENERAL
PAINLEVEL_OUTOF10: 5
PAINLEVEL_OUTOF10: 3
PAINLEVEL_OUTOF10: 0

## 2023-12-29 NOTE — CARE COORDINATION
SPOKE WITH PATIENT AND DECLINED NEED FOR HHC. WILL LET US KNOW IF SHE CHANGES HER MIND. PT WILL NEED HOME 02 EVAL PRIOR TO DC.

## 2023-12-29 NOTE — PLAN OF CARE
Problem: Discharge Planning  Goal: Discharge to home or other facility with appropriate resources  Outcome: Progressing  Flowsheets (Taken 12/26/2023 1600 by Suresh Romo RN)  Discharge to home or other facility with appropriate resources:   Identify barriers to discharge with patient and caregiver   Arrange for needed discharge resources and transportation as appropriate     Problem: Skin/Tissue Integrity  Goal: Absence of new skin breakdown  Description: 1.  Monitor for areas of redness and/or skin breakdown  2.  Assess vascular access sites hourly  3.  Every 4-6 hours minimum:  Change oxygen saturation probe site  4.  Every 4-6 hours:  If on nasal continuous positive airway pressure, respiratory therapy assess nares and determine need for appliance change or resting period.  Outcome: Progressing     Problem: Safety - Adult  Goal: Free from fall injury  Outcome: Progressing     
2029)  Care Plan - Patient's Chronic Conditions and Co-Morbidity Symptoms are Monitored and Maintained or Improved:   Monitor and assess patient's chronic conditions and comorbid symptoms for stability, deterioration, or improvement   Collaborate with multidisciplinary team to address chronic and comorbid conditions and prevent exacerbation or deterioration   Update acute care plan with appropriate goals if chronic or comorbid symptoms are exacerbated and prevent overall improvement and discharge     Problem: Pain  Goal: Verbalizes/displays adequate comfort level or baseline comfort level  Outcome: Progressing     
Cheek-To-Nose Interpolation Flap Text: A decision was made to reconstruct the defect utilizing an interpolation axial flap and a staged reconstruction.  A telfa template was made of the defect.  This telfa template was then used to outline the Cheek-To-Nose Interpolation flap.  The donor area for the pedicle flap was then injected with anesthesia.  The flap was excised through the skin and subcutaneous tissue down to the layer of the underlying musculature.  The interpolation flap was carefully excised within this deep plane to maintain its blood supply.  The edges of the donor site were undermined.   The donor site was closed in a primary fashion.  The pedicle was then rotated into position and sutured.  Once the tube was sutured into place, adequate blood supply was confirmed with blanching and refill.  The pedicle was then wrapped with xeroform gauze and dressed appropriately with a telfa and gauze bandage to ensure continued blood supply and protect the attached pedicle.

## 2023-12-30 VITALS
SYSTOLIC BLOOD PRESSURE: 175 MMHG | OXYGEN SATURATION: 98 % | HEART RATE: 73 BPM | WEIGHT: 293 LBS | BODY MASS INDEX: 57.52 KG/M2 | HEIGHT: 60 IN | RESPIRATION RATE: 20 BRPM | TEMPERATURE: 98 F | DIASTOLIC BLOOD PRESSURE: 59 MMHG

## 2023-12-30 LAB
ALBUMIN SERPL-MCNC: 3.5 G/DL (ref 3.5–4.6)
ANION GAP SERPL CALCULATED.3IONS-SCNC: 11 MEQ/L (ref 9–15)
BUN SERPL-MCNC: 28 MG/DL (ref 8–23)
CALCIUM SERPL-MCNC: 8.3 MG/DL (ref 8.5–9.9)
CHLORIDE SERPL-SCNC: 101 MEQ/L (ref 95–107)
CO2 SERPL-SCNC: 29 MEQ/L (ref 20–31)
CREAT SERPL-MCNC: 1.14 MG/DL (ref 0.5–0.9)
ERYTHROCYTE [DISTWIDTH] IN BLOOD BY AUTOMATED COUNT: 15.6 % (ref 11.5–14.5)
GLUCOSE BLD-MCNC: 101 MG/DL (ref 70–99)
GLUCOSE BLD-MCNC: 157 MG/DL (ref 70–99)
GLUCOSE BLD-MCNC: 167 MG/DL (ref 70–99)
GLUCOSE SERPL-MCNC: 96 MG/DL (ref 70–99)
HCT VFR BLD AUTO: 37.4 % (ref 37–47)
HGB BLD-MCNC: 11.2 G/DL (ref 12–16)
MCH RBC QN AUTO: 25.1 PG (ref 27–31.3)
MCHC RBC AUTO-ENTMCNC: 29.9 % (ref 33–37)
MCV RBC AUTO: 83.9 FL (ref 79.4–94.8)
PERFORMED ON: ABNORMAL
PHOSPHATE SERPL-MCNC: 2.6 MG/DL (ref 2.3–4.8)
PLATELET # BLD AUTO: 184 K/UL (ref 130–400)
POTASSIUM SERPL-SCNC: 4.6 MEQ/L (ref 3.4–4.9)
POTASSIUM SERPL-SCNC: 4.6 MEQ/L (ref 3.4–4.9)
RBC # BLD AUTO: 4.46 M/UL (ref 4.2–5.4)
SODIUM SERPL-SCNC: 141 MEQ/L (ref 135–144)
WBC # BLD AUTO: 5.2 K/UL (ref 4.8–10.8)

## 2023-12-30 PROCEDURE — 6370000000 HC RX 637 (ALT 250 FOR IP): Performed by: STUDENT IN AN ORGANIZED HEALTH CARE EDUCATION/TRAINING PROGRAM

## 2023-12-30 PROCEDURE — 94640 AIRWAY INHALATION TREATMENT: CPT

## 2023-12-30 PROCEDURE — 94761 N-INVAS EAR/PLS OXIMETRY MLT: CPT

## 2023-12-30 PROCEDURE — 6360000002 HC RX W HCPCS: Performed by: STUDENT IN AN ORGANIZED HEALTH CARE EDUCATION/TRAINING PROGRAM

## 2023-12-30 PROCEDURE — 85027 COMPLETE CBC AUTOMATED: CPT

## 2023-12-30 PROCEDURE — 94760 N-INVAS EAR/PLS OXIMETRY 1: CPT

## 2023-12-30 PROCEDURE — 6370000000 HC RX 637 (ALT 250 FOR IP): Performed by: INTERNAL MEDICINE

## 2023-12-30 PROCEDURE — 2700000000 HC OXYGEN THERAPY PER DAY

## 2023-12-30 PROCEDURE — 36415 COLL VENOUS BLD VENIPUNCTURE: CPT

## 2023-12-30 PROCEDURE — 2580000003 HC RX 258: Performed by: STUDENT IN AN ORGANIZED HEALTH CARE EDUCATION/TRAINING PROGRAM

## 2023-12-30 PROCEDURE — 80069 RENAL FUNCTION PANEL: CPT

## 2023-12-30 PROCEDURE — 99232 SBSQ HOSP IP/OBS MODERATE 35: CPT | Performed by: INTERNAL MEDICINE

## 2023-12-30 PROCEDURE — 2060000000 HC ICU INTERMEDIATE R&B

## 2023-12-30 RX ORDER — GUAIFENESIN 600 MG/1
600 TABLET, EXTENDED RELEASE ORAL 2 TIMES DAILY PRN
Qty: 28 TABLET | Refills: 0 | Status: SHIPPED | OUTPATIENT
Start: 2023-12-30

## 2023-12-30 RX ORDER — PREDNISONE 20 MG/1
TABLET ORAL
Qty: 20 TABLET | Refills: 0 | Status: SHIPPED | OUTPATIENT
Start: 2023-12-31 | End: 2024-01-06

## 2023-12-30 RX ADMIN — GUAIFENESIN 600 MG: 600 TABLET ORAL at 08:17

## 2023-12-30 RX ADMIN — ENOXAPARIN SODIUM 40 MG: 100 INJECTION SUBCUTANEOUS at 08:17

## 2023-12-30 RX ADMIN — IPRATROPIUM BROMIDE AND ALBUTEROL SULFATE 1 DOSE: 2.5; .5 SOLUTION RESPIRATORY (INHALATION) at 07:32

## 2023-12-30 RX ADMIN — GUAIFENESIN 600 MG: 600 TABLET ORAL at 21:35

## 2023-12-30 RX ADMIN — ACETAMINOPHEN 650 MG: 325 TABLET ORAL at 08:28

## 2023-12-30 RX ADMIN — MICONAZOLE NITRATE: 2 POWDER TOPICAL at 08:19

## 2023-12-30 RX ADMIN — METHYLPREDNISOLONE SODIUM SUCCINATE 40 MG: 40 INJECTION, POWDER, FOR SOLUTION INTRAMUSCULAR; INTRAVENOUS at 08:17

## 2023-12-30 RX ADMIN — LISINOPRIL 20 MG: 20 TABLET ORAL at 08:17

## 2023-12-30 RX ADMIN — OSELTAMIVIR PHOSPHATE 75 MG: 75 CAPSULE ORAL at 21:36

## 2023-12-30 RX ADMIN — AMLODIPINE BESYLATE 10 MG: 10 TABLET ORAL at 08:17

## 2023-12-30 RX ADMIN — BUSPIRONE HYDROCHLORIDE 10 MG: 5 TABLET ORAL at 08:17

## 2023-12-30 RX ADMIN — IPRATROPIUM BROMIDE AND ALBUTEROL SULFATE 1 DOSE: 2.5; .5 SOLUTION RESPIRATORY (INHALATION) at 19:13

## 2023-12-30 RX ADMIN — BUSPIRONE HYDROCHLORIDE 10 MG: 5 TABLET ORAL at 16:20

## 2023-12-30 RX ADMIN — LEVOTHYROXINE SODIUM 88 MCG: 88 TABLET ORAL at 08:17

## 2023-12-30 RX ADMIN — OSELTAMIVIR PHOSPHATE 75 MG: 75 CAPSULE ORAL at 08:28

## 2023-12-30 RX ADMIN — LISINOPRIL 20 MG: 20 TABLET ORAL at 21:35

## 2023-12-30 RX ADMIN — Medication 10 ML: at 08:28

## 2023-12-30 ASSESSMENT — PAIN SCALES - GENERAL: PAINLEVEL_OUTOF10: 0

## 2023-12-30 NOTE — DISCHARGE SUMMARY
Hospital Medicine Discharge Summary    Evie Crespo  :  1960  MRN:  [id number]    Admit date:  2023  Discharge date:  2023    Admitting Physician:  Andrew Bustamante MD  Primary Care Physician:  Tommy Boyd      Discharge Diagnoses:    Acute hypoxic respiratory failure  Influenza A pneumonia  DEEP      Chief Complaint   Patient presents with    Shortness of Breath     Since [facility] Course:     63 y.o. female with PMH DEEP, morbid obesity, chronic lymphedema, T2DM, HTN who presented to ED for dyspnea and was found to have Influenza pneumonia. She has reported hx of asthma, and given wheezing on exam she was treated for asthma exacerbation with duonebs, steroids, and completed course of Tamiflu. She initially required ICU admission for continuous BIPAP, but was able to be weaned to NC. At discharge she qualified for home O2: 2L at rest and 4.5 L with exertion. She was discharged with [facility] and recommended to obtain outpatient sleep study for suspected DEEP.       Exam on discharge:   BP (!) 152/72   Pulse 67   Temp 98 °F (36.7 °C) (Axillary)   Resp 20   Ht 1.524 m (5')   Wt (!) 193.3 kg (426 lb 2.4 oz)   SpO2 93%   BMI 83.23 kg/m²   General appearance: No apparent distress, appears stated age and cooperative.  HEENT: Pupils equal, round, and reactive to light. Conjunctivae/corneas clear.  Neck: Supple, with full range of motion. No jugular venous distention. Trachea midline.  Respiratory:  Normal respiratory effort. Clear to auscultation, bilaterally without Rales/Wheezes/Rhonchi.  Cardiovascular: Regular rate and rhythm with normal S1/S2 without murmurs, rubs or gallops.  Abdomen: Soft, non-tender, non-distended with normal bowel sounds.  Musculoskeletal: No clubbing, cyanosis or edema bilaterally.  Full range of motion without deformity.  Skin: Skin color, texture, turgor normal.  No rashes or lesions.  Neuro: Non Focal. Symetrical motor and tone. Nl Comprehension,

## 2023-12-31 PROCEDURE — 2700000000 HC OXYGEN THERAPY PER DAY

## 2024-01-02 NOTE — PROGRESS NOTES
[facility] Hospitalist Progress Note    Admitting Date and Time: 12/26/2023 10:05 AM  Admit Dx: Respiratory distress [R06.03]  Influenza A [J10.1]  Acute respiratory failure with hypoxia (HCC) [J96.01]  Other form of dyspnea [R06.09]    Subjective:    Patient transferred to floor on HHF, though during my evaluation patient was back on BIPAP in no distress. Still reports persistent dyspnea at this time.       ROS: denies fever, chills, cp, sob, n/v, HA unless stated above.       ipratropium 0.5 mg-albuterol 2.5 mg  1 Dose Inhalation BID RT    amLODIPine  10 mg Oral Daily    lisinopril  20 mg Oral BID    miconazole   Topical BID    busPIRone  10 mg Oral BID    levothyroxine  88 mcg Oral Daily    guaiFENesin  600 mg Oral BID    enoxaparin  40 mg SubCUTAneous BID    insulin lispro  0-8 Units SubCUTAneous 4x Daily AC & HS    sodium chloride flush  5-40 mL IntraVENous 2 times per day    methylPREDNISolone  40 mg IntraVENous Daily    oseltamivir  75 mg Oral BID     ipratropium 0.5 mg-albuterol 2.5 mg, 1 Dose, Q4H PRN  albuterol sulfate HFA, 2 puff, Q6H PRN  labetalol, 10 mg, Q4H PRN  sodium chloride flush, 5-40 mL, PRN  sodium chloride, , PRN  potassium chloride, 40 mEq, PRN   Or  potassium alternative oral replacement, 40 mEq, PRN   Or  potassium chloride, 10 mEq, PRN  magnesium sulfate, 2,000 mg, PRN  ondansetron, 4 mg, Q8H PRN   Or  ondansetron, 4 mg, Q6H PRN  polyethylene glycol, 17 g, Daily PRN  acetaminophen, 650 mg, Q6H PRN   Or  acetaminophen, 650 mg, Q6H PRN  glucose, 4 tablet, PRN  dextrose bolus, 125 mL, PRN   Or  dextrose bolus, 250 mL, PRN  glucagon (rDNA), 1 mg, PRN  dextrose, , Continuous PRN         Objective:    BP (!) 131/59   Pulse 72   Temp 97.9 °F (36.6 °C) (Oral)   Resp 18   Ht 1.524 m (5')   Wt (!) 193.3 kg (426 lb 2.4 oz)   SpO2 100%   BMI 83.23 kg/m²     General Appearance: alert and oriented to person, place and time and in no acute distress  Skin: warm and dry  Head: normocephalic 
       [facility] Hospitalist Progress Note    Admitting Date and Time: 12/26/2023 10:05 AM  Admit Dx: Respiratory distress [R06.03]  Influenza A [J10.1]  Acute respiratory failure with hypoxia (HCC) [J96.01]  Other form of dyspnea [R06.09]    Subjective:    Patient feeling much improved today, sitting up in bed on 5L. She has no acute complaints.       ROS: denies fever, chills, cp, sob, n/v, HA unless stated above.       ipratropium 0.5 mg-albuterol 2.5 mg  1 Dose Inhalation BID RT    amLODIPine  10 mg Oral Daily    lisinopril  20 mg Oral BID    miconazole   Topical BID    busPIRone  10 mg Oral BID    levothyroxine  88 mcg Oral Daily    guaiFENesin  600 mg Oral BID    enoxaparin  40 mg SubCUTAneous BID    insulin lispro  0-8 Units SubCUTAneous 4x Daily AC & HS    sodium chloride flush  5-40 mL IntraVENous 2 times per day    methylPREDNISolone  40 mg IntraVENous Daily    oseltamivir  75 mg Oral BID     ipratropium 0.5 mg-albuterol 2.5 mg, 1 Dose, Q4H PRN  albuterol sulfate HFA, 2 puff, Q6H PRN  labetalol, 10 mg, Q4H PRN  sodium chloride flush, 5-40 mL, PRN  sodium chloride, , PRN  potassium chloride, 40 mEq, PRN   Or  potassium alternative oral replacement, 40 mEq, PRN   Or  potassium chloride, 10 mEq, PRN  magnesium sulfate, 2,000 mg, PRN  ondansetron, 4 mg, Q8H PRN   Or  ondansetron, 4 mg, Q6H PRN  polyethylene glycol, 17 g, Daily PRN  acetaminophen, 650 mg, Q6H PRN   Or  acetaminophen, 650 mg, Q6H PRN  glucose, 4 tablet, PRN  dextrose bolus, 125 mL, PRN   Or  dextrose bolus, 250 mL, PRN  glucagon (rDNA), 1 mg, PRN  dextrose, , Continuous PRN         Objective:    BP (!) 158/70   Pulse 70   Temp 97.6 °F (36.4 °C) (Axillary)   Resp 21   Ht 1.524 m (5')   Wt (!) 193.3 kg (426 lb 2.4 oz)   SpO2 100%   BMI 83.23 kg/m²     General Appearance: alert and oriented to person, place and time and in no acute distress  Skin: warm and dry  Head: normocephalic and atraumatic  Eyes: pupils equal, round, and reactive to 
       [facility] Hospitalist Progress Note    Admitting Date and Time: 12/26/2023 10:05 AM  Admit Dx: Respiratory distress [R06.03]  Influenza A [J10.1]  Acute respiratory failure with hypoxia (HCC) [J96.01]  Other form of dyspnea [R06.09]    Subjective:    Patient off of BIPAP and feeling improved on HHF, though still visibly dyspneic.       ROS: denies fever, chills, cp, sob, n/v, HA unless stated above.       miconazole   Topical BID    lisinopril  10 mg Oral BID    amLODIPine  5 mg Oral Daily    busPIRone  10 mg Oral BID    levothyroxine  88 mcg Oral Daily    guaiFENesin  600 mg Oral BID    enoxaparin  40 mg SubCUTAneous BID    sodium chloride flush  5-40 mL IntraVENous 2 times per day    ipratropium 0.5 mg-albuterol 2.5 mg  1 Dose Inhalation Q4H WA RT    methylPREDNISolone  40 mg IntraVENous Daily    oseltamivir  75 mg Oral BID    insulin lispro  0-8 Units SubCUTAneous Q4H     sodium chloride flush, 5-40 mL, PRN  sodium chloride, , PRN  potassium chloride, 40 mEq, PRN   Or  potassium alternative oral replacement, 40 mEq, PRN   Or  potassium chloride, 10 mEq, PRN  magnesium sulfate, 2,000 mg, PRN  ondansetron, 4 mg, Q8H PRN   Or  ondansetron, 4 mg, Q6H PRN  polyethylene glycol, 17 g, Daily PRN  acetaminophen, 650 mg, Q6H PRN   Or  acetaminophen, 650 mg, Q6H PRN  glucose, 4 tablet, PRN  dextrose bolus, 125 mL, PRN   Or  dextrose bolus, 250 mL, PRN  glucagon (rDNA), 1 mg, PRN  dextrose, , Continuous PRN  labetalol, 10 mg, Q4H PRN         Objective:    BP (!) 164/73   Pulse 78   Temp 99.1 °F (37.3 °C) (Axillary)   Resp 28   Ht 1.524 m (5')   Wt (!) 193.3 kg (426 lb 2.4 oz)   SpO2 97%   BMI 83.23 kg/m²     General Appearance: alert and oriented to person, place and time and in no acute distress  Skin: warm and dry  Head: normocephalic and atraumatic  Eyes: pupils equal, round, and reactive to light, extraocular eye movements intact, conjunctivae normal  Neck: neck supple and non tender without mass 
   12/28/23 0500   RT Protocol   History Pulmonary Disease 2   Respiratory pattern 0   Breath sounds 4   Cough 0   Indications for Bronchodilator Therapy Wheezing associated with pulm disorder   Bronchodilator Assessment Score 6       
   12/28/23 1941   RT Protocol   History Pulmonary Disease 2   Respiratory pattern 0   Breath sounds 4   Cough 0   Indications for Bronchodilator Therapy Wheezing associated with pulm disorder   Bronchodilator Assessment Score 6       
   12/29/23 0900   RT Protocol   History Pulmonary Disease 0   Respiratory pattern 2   Breath sounds 2   Cough 0   Indications for Bronchodilator Therapy Decreased or absent breath sounds   Bronchodilator Assessment Score 4       
   12/29/23 2200   RT Protocol   History Pulmonary Disease 0   Respiratory pattern 2   Breath sounds 2   Cough 0   Indications for Bronchodilator Therapy Decreased or absent breath sounds   Bronchodilator Assessment Score 4       
   12/30/23 1238   Resting (Room Air)   SpO2 88   HR 84   Resting (On O2)   SpO2 91   HR 84   O2 Device Nasal cannula   O2 Flow Rate (l/min) 2 l/min   During Walk (On O2)   SpO2 87      O2 Device Nasal cannula   O2 Flow Rate (l/min) 2 l/min   Need Additional O2 Flow Rate Rows Yes   O2 Flow Rate (l/min) 3 l/min   O2 Saturation 88   O2 Flow Rate (l/min) 4 l/min   O2 Saturation 88   O2 Flow Rate (l/min) 4.5 l/min   O2 Saturation 91   Walk/Assistance Device Walker   Rate of Dyspnea 1   Symptoms Shortness of breath   After Walk   SpO2 91   HR 85   O2 Device Nasal cannula   O2 Flow Rate (l/min) 4.5 l/min   Rate of Dyspnea 0   Does the Patient Qualify for Home O2 Yes   Liter Flow at Rest 2   Liter Flow on Exertion 4.5   Does the Patient Need Portable Oxygen Tanks Yes       
   12/30/23 2300   RT Protocol   History Pulmonary Disease 0   Respiratory pattern 2   Breath sounds 2   Cough 0   Indications for Bronchodilator Therapy Decreased or absent breath sounds   Bronchodilator Assessment Score 4       
  Critical Care Progress Note    2023 11:54 AM    Subjective:   Admit Date: 2023  PCP: Tommy Boyd    Chief Complaint   Patient presents with    Shortness of Breath     Since      Interval History: Has been on BiPAP.  Did well overnight.  Respiratory rate is down a bit.  Oxygen saturation is in the mid to upper 90s.  Low-grade fever overnight.  Has some cough and sputum production.        Medications:   Scheduled Meds:   miconazole   Topical BID    lisinopril  10 mg Oral BID    amLODIPine  5 mg Oral Daily    busPIRone  10 mg Oral BID    levothyroxine  88 mcg Oral Daily    guaiFENesin  600 mg Oral BID    sodium chloride flush  5-40 mL IntraVENous 2 times per day    enoxaparin  40 mg SubCUTAneous Daily    ipratropium 0.5 mg-albuterol 2.5 mg  1 Dose Inhalation Q4H WA RT    methylPREDNISolone  40 mg IntraVENous Daily    oseltamivir  75 mg Oral BID    insulin lispro  0-8 Units SubCUTAneous Q4H     Continuous Infusions:   sodium chloride 100 mL/hr at 23 0601    sodium chloride      dextrose           Objective:   Vitals:   Temp (24hrs), Av.3 °F (37.9 °C), Min:99.1 °F (37.3 °C), Max:102.3 °F (39.1 °C)    BP (!) 184/167   Pulse 86   Temp 99.1 °F (37.3 °C) (Axillary)   Resp 25   Ht 1.524 m (5')   Wt (!) 193.3 kg (426 lb 2.4 oz)   SpO2 98%   BMI 83.23 kg/m²   I/O:24HR INTAKE/OUTPUT:    Intake/Output Summary (Last 24 hours) at 2023 1154  Last data filed at 2023 0601  Gross per 24 hour   Intake 1406.47 ml   Output 150 ml   Net 1256.47 ml      0701 -  0700  In: 1406.5 [P.O.:100; I.V.:1306.5]  Out: 150 [Urine:150]  CVP:           Physical Exam:  General appearance -morbidly obese and ill-appearing.  Mental status - alert, oriented to person, place, and time  Eyes - pupils equal and reactive, extraocular eye movements intact  Nose -noninvasive ventilator mask on  Neck -thick and short neck, supple, no significant adenopathy  Chest -diminished bilaterally  to 
  Pharmacy Dose Adjustment Per Protocol    Evie Crespo is a 63 y.o. female.     Recent Labs     12/26/23  1015 12/26/23  1051 12/26/23  1705 12/27/23  0917   BUN 17  --   --  16   CREATININE 1.18*   < > 1.1 1.10*    < > = values in this interval not displayed.   Estimated Creatinine Clearance: 88 mL/min (A) (based on SCr of 1.1 mg/dL (H)).  .    Height: 152.4 cm (5'), Weight - Scale: (!) 193.3 kg (426 lb 2.4 oz)    Current order:  Enoxaparin 40 mg SUBQ once daily      Plan:  Pharmacologic VTE prophylaxis modified based on patient weight and renal function per [facility]/P&T approved protocol     Patient Weight (kg)      50.9 and below .9 101-150.9 151-174.9 175 or greater   Estimated   CrCl  (ml/min) 30 or greater []   30 mg   SUBQ daily   []   40 mg   SUBQ daily (or 30 mg BID for orthopedic cases) []  30 mg SUBQ   BID  [x]  40 mg   SUBQ   BID []  60mg SUBQ BID    15-29.9 []  UFH 5000   units SUBQ BID []  30 mg   SUBQ daily [] 30 mg SUBQ   daily []  40 mg SUBQ   daily [] 60 mg SUBQ   daily    Less than 15 or dialysis []  UFH 5000   units SUBQ BID [] UFH 5000 units SUBQ TID []  UFH 7500   units   SUBQ TID       Thank you,    Neetu Naqvi, PharmD  12/27/2023 1:11 PM        
  Physician Progress Note      PATIENT:               BETHANY PARKER  CSN #:                  [id number]  :                       1960  ADMIT DATE:       2023 10:05 AM  DISCH DATE:  RESPONDING  PROVIDER #:        Andrew Bustamante MD          QUERY TEXT:    Patient admitted with Acute hypoxic respiratory failure, Influenza A   pneumonia. Patient noted to have temp 100.4, pulse 115,  RR 40,  procalcitonin   0.39  on admission. If possible, please document in the progress notes and   discharge summary if you are evaluating and /or treating any of the following:    The medical record reflects the following:  Risk Factors: 63 yof, acute respiratory failure, Influenza A pneumonia, DEEP,   pulmonary HTN  Clinical Indicators: presents with SOB, temp 100.4, pulse 115,  RR 40,    procalcitonin 0.39  on admission, CTA chest:  Linear atelectasis in the left   lung base, Dr Grant critical care consult 23: She has groundglass   opacities bilaterally.\"  Treatment: admit critical care,  IV steroids,  antiviral hold off on   antibiotics,  I/O, ABGs, BiPAP    Thank you  Cindy LAYNEN RN [facility]   M-F 6am-2pm  Options provided:  -- Sepsis, present on admission  -- Influenza A pneumonia without Sepsis  -- Other - I will add my own diagnosis  -- Disagree - Not applicable / Not valid  -- Disagree - Clinically unable to determine / Unknown  -- Refer to Clinical Documentation Reviewer    PROVIDER RESPONSE TEXT:    This patient has sepsis which was present on admission.    Query created by: Cindy James on 2023 7:11 AM      Electronically signed by:  Andrew Bustamante MD 2023 8:40 AM          
1937- Gave pt discharge paperwork, reviewed medications, education and follow up needed. Tele removed/IV access removed. O2 concentrator at pt bedside to take at discharge. [facility] scheduled for 1800 pickup. Report given to night shift nurse pt awaiting pickup   
2030- HS assessment completed. /135. Pt to received scheduled meds. HR NSR 88. Afebrile. SpO2 96% on 40L heated high flow. Pt reports 3/10 pain from previous spinal injury. Medicated with PRN Tylenol. Pericare completed for incontinence. Plan of care discussed. Falls precautions in place. Call light in reach. Team to continue to monitor closely.      2315- Perfectserve to Dr. Parra regarding labile BP, SBP ranging from 160-180. Pt asymptomatic.     2320- Return call from Dr. Parra. Case discussed. Orders to be implemented. Incontinence care, complete linen change, purewick replaced.     2345- Medicated with PRN Labetalol per MAR.     0340- Tele pack applied. Pt transferred to  with 2PCAs and RT at bedside. All pts belonging sent with pt. No distress noted. Report called to BOO Nguyen.    
INPATIENT PROGRESS NOTES    PATIENT NAME: Evie Crespo  MRN: [id number]  SERVICE DATE:  December 30, 2023   SERVICE TIME:  1:11 PM      PRIMARY SERVICE: Pulmonary Disease    CHIEF COMPLAINTS: resp failure     INTERVAL HPI: Patient seen and examined at bedside, Interval Notes, orders reviewed. Nursing notes noted    Doing better, sitting in chair, shortness of breath improved, no coughing, no chest pain, no worsening lower extremity edema.    New information updated in the note today, rest of the examination did not change compared to yesterday.      Review of system:     GI Abdominal pain No  Skin Rash No    Social History     Tobacco Use    Smoking status: Never    Smokeless tobacco: Never   Substance Use Topics    Alcohol use: Yes     Comment: occassionally         Problem Relation Age of Onset    Bipolar Disorder Mother     Bipolar Disorder Sister     Bipolar Disorder Brother          OBJECTIVE    Body mass index is 83.23 kg/m².    PHYSICAL EXAM:  Vitals:  BP (!) 152/72   Pulse 67   Temp 98 °F (36.7 °C) (Axillary)   Resp 20   Ht 1.524 m (5')   Wt (!) 193.3 kg (426 lb 2.4 oz)   SpO2 93%   BMI 83.23 kg/m²     General: alert, cooperative, no distress  Head: normocephalic, atraumatic  Eyes:No gross abnormalities.  ENT:  MMM no lesions  Neck:  supple and no masses  Chest : Good air movement, no wheezing, no rales, nontender, tympanic  Heart:: Heart sounds are normal.  Regular rate and rhythm without murmur, gallop or rub.  ABD:  symmetric, soft, non-tender  Musculoskeletal : no cyanosis, no clubbing, no edema, and venous stasis dermatosis noted  Neuro:  Grossly normal  Skin: No rashes or nodules noted.  Lymph node:  no cervical nodes  Urology: No Schneider   Psychiatric: appropriate    DATA:   Recent Labs     12/29/23  0448 12/30/23  0517   WBC 6.0 5.2   HGB 11.2* 11.2*   HCT 38.2 37.4   MCV 85.7 83.9    184       Recent Labs     12/29/23  0448 12/30/23  0517    141   K 5.1* 4.6  4.6    101 
[facility] OCCUPATIONAL THERAPY EVALUATION - ACUTE     NAME: Evie Crespo  : 1960 (63 y.o.)  MRN: [id number]  CODE STATUS: Full Code  Room: 79/79-01    Date of Service: 2023    Patient Diagnosis(es): Respiratory distress [R06.03]  Influenza A [J10.1]  Acute respiratory failure with hypoxia (HCC) [J96.01]  Other form of dyspnea [R06.09]   Patient Active Problem List    Diagnosis Date Noted    Acute respiratory failure with hypoxia (HCC) 2023    Lymphedema due to venous disease 03/15/2022    Venous stasis ulcer of right calf limited to breakdown of skin without varicose veins (HCC) 03/15/2022    Cellulitis and abscess of left lower extremity     Abscess 2022    Anxiety disorder, unspecified 2019    Chronic post-traumatic stress disorder (PTSD) 2019    Bipolar 1 disorder, depressed, severe (HCC) 2019    Perirectal abscess     Asthma, chronic 2019    HTN (hypertension) 2019    Hyperlipidemia 2019    Stasis dermatitis 2019    Sepsis (HCC) 2019    Cervical myelopathy (HCC) 2017    Diabetes mellitus due to underlying condition with diabetic polyneuropathy (HCC) 2017    Cervical vertebral fusion 2017    Diabetes mellitus type II, controlled ([facility]) 2014    Metabolic syndrome 2010        Past Medical History:   Diagnosis Date    Bipolar depression (HCC)     Depression     Diabetes mellitus (HCC)     Hypertension     PTSD (post-traumatic stress disorder)     Thyroid disease      Past Surgical History:   Procedure Laterality Date    BREAST LUMPECTOMY Right     CERVICAL LAMINECTOMY      C2-C6    INCISION AND DRAINAGE Left 2019    INCISION AND DRAINAGE LEFT PERIRECTAL  ABSCESS performed by Miquel Cuello MD at [facility] OR    LIPOMA RESECTION      ROTATOR CUFF REPAIR      WOUND EXPLORATION Left 3/16/2022    INCISION AND DRAINAGE OF LEFT LEG ABSCESS WITH WOUND VAC PLACEMENT performed by Miquel Cuello MD at [facility] 
Patient arrived to [address] [place] [address] at this time. Received phone report from Li HADDAD in ICU. Patient is alert and oriented, RT at bedside placing patient back on bipap. Currently showing NSR on the monitor with HR running in the 60s.  Patient is anxious and tearful, stating it hurts when she is moved like this (referring to be moved from ICU to [facility]). Reassured patient, and dimmed her lights to promote rest and relaxation.        
Physical Therapy  Facility/Department: [facility] MED SURG W179/W179-01  Physical Therapy Discharge      NAME: Evie Crespo    : 1960 (63 y.o.)  MRN: [id number]    Account: [id number]  Gender: female      Patient has been discharged from acute care hospital. DC patient from current PT program.      Electronically signed by Caro Mckeon PT on 24 at 11:54 AM EST      
Physical Therapy Med Surg Daily Treatment Note  Facility/Department: [facility] TELEMETRY  Room: [place] [address]       NAME: Evie Crespo  : 1960 (63 y.o.)  MRN: [id number]  CODE STATUS: Full Code    Date of Service: 2023    Patient Diagnosis(es): Respiratory distress [R06.03]  Influenza A [J10.1]  Acute respiratory failure with hypoxia (HCC) [J96.01]  Other form of dyspnea [R06.09]   Chief Complaint   Patient presents with    Shortness of Breath     Since jada sundeep     Patient Active Problem List    Diagnosis Date Noted    Acute respiratory failure with hypoxia (HCC) 2023    Lymphedema due to venous disease 03/15/2022    Venous stasis ulcer of right calf limited to breakdown of skin without varicose veins (HCC) 03/15/2022    Cellulitis and abscess of left lower extremity     Abscess 2022    Anxiety disorder, unspecified 2019    Chronic post-traumatic stress disorder (PTSD) 2019    Bipolar 1 disorder, depressed, severe (HCC) 2019    Perirectal abscess     Asthma, chronic 2019    HTN (hypertension) 2019    Hyperlipidemia 2019    Stasis dermatitis 2019    Sepsis (HCC) 2019    Cervical myelopathy (HCC) 2017    Diabetes mellitus due to underlying condition with diabetic polyneuropathy (HCC) 2017    Cervical vertebral fusion 2017    Diabetes mellitus type II, controlled ([facility]) 2014    Metabolic syndrome 2010        Past Medical History:   Diagnosis Date    Bipolar depression (HCC)     Depression     Diabetes mellitus (HCC)     Hypertension     PTSD (post-traumatic stress disorder)     Thyroid disease      Past Surgical History:   Procedure Laterality Date    BREAST LUMPECTOMY Right     CERVICAL LAMINECTOMY      C2-C6    INCISION AND DRAINAGE Left 2019    INCISION AND DRAINAGE LEFT PERIRECTAL  ABSCESS performed by Miquel Cuello MD at [facility] OR    LIPOMA RESECTION      ROTATOR CUFF REPAIR      WOUND EXPLORATION 
Shift summary:  Assumed care of patient, assessment completed, patient on BiPAP.     Rounds completed- patient placed on heated high flow- tolerated well. Mucinex and Lasix ordered and given.     Patient had loose stool throughout shift.     Handoff report given to Li HADDAD.   
VS obtained, resting in bed, states \"I feel like my fever broke\" temp rechecked @ 100.5, temp improved from initial tylenol dose, bed alarm on and bipap maintained    12/26/23 2215  Pm meds given, pt tolerates well, assessment completed, bipap maintained, speech clear, purewick maintained, A&Ox4    12/27/23 0015  Call light on, pt appears anxious, states doesn't feel like can catch breath, HOB elevated for comfort and bipap mask adjusted, temp recheck was 100, medicated with Tylenol per pt request, OT obtained, pt states can breathe easier with mask adjustment    12/27/23 0540  Pt offered to move to bariatric bed for comfort, pt refusing bed @ this time, states she feels comfortable in current bed, lift sheet placed under pt with new linens, purewick replaced, pt tearful after movement, pt reassured and repositioned for comfort, reddened and excoriated areas noted to abd folds to right side  
  BUN 24* 26*   CREATININE 1.24* 1.20*   GLUCOSE 103* 103*   CALCIUM 8.2* 8.4*   LABGLOM 49.2* 51.2*         MV Settings:     FiO2 : 40 %    Recent Labs     12/26/23  1705   PHART 7.420   TKO8PRQ 43   PO2ART 87*   ACJ0HIA 27.7   BEART 3   G2KAPOFN 97*         O2 Device: High flow nasal cannula  O2 Flow Rate (L/min): 5 L/min    ADULT DIET; Regular; 5 carb choices (75 gm/meal)     MEDICATIONS during current hospitalization:    Continuous Infusions:   sodium chloride      dextrose         Scheduled Meds:   ipratropium 0.5 mg-albuterol 2.5 mg  1 Dose Inhalation BID RT    amLODIPine  10 mg Oral Daily    lisinopril  20 mg Oral BID    miconazole   Topical BID    busPIRone  10 mg Oral BID    levothyroxine  88 mcg Oral Daily    guaiFENesin  600 mg Oral BID    enoxaparin  40 mg SubCUTAneous BID    insulin lispro  0-8 Units SubCUTAneous 4x Daily AC & HS    sodium chloride flush  5-40 mL IntraVENous 2 times per day    methylPREDNISolone  40 mg IntraVENous Daily    oseltamivir  75 mg Oral BID       PRN Meds:ipratropium 0.5 mg-albuterol 2.5 mg, albuterol sulfate HFA, labetalol, sodium chloride flush, sodium chloride, potassium chloride **OR** potassium alternative oral replacement **OR** potassium chloride, magnesium sulfate, ondansetron **OR** ondansetron, polyethylene glycol, acetaminophen **OR** acetaminophen, glucose, dextrose bolus **OR** dextrose bolus, glucagon (rDNA), dextrose    Radiology  Chest x-ray reviewed by me, shows congestion        IMPRESSION AND SUGGESTION:  Patient is at risk due to   Acute hypoxic respiratory failure  Influenza A pneumonia  Possible bacterial coinfection  Possible hyperactive airway disease with acute exacerbation  Pulmonary edema with volume overload  Obstructive sleep apnea on CPAP    Recommendation  Continue Solu-Medrol  Lasix 20 mg IV x 1,    Monitor renal function and electrolytes  Watch volume status maintain euvolemic  Continue current inhalers  BiPAP as needed and while 
with acute exacerbation  Pulmonary edema with volume overload  Obstructive sleep apnea on CPAP    Recommendation  Continue Solu-Medrol  Consider Lasix once renal function improves  Monitor renal function and electrolytes  Watch volume status maintain euvolemic  Chest x-ray tomorrow  Continue current inhalers  BiPAP as needed and while asleep  Tamiflu for 5 days  Repeat procalcitonin         Electronically signed by Amelia Hernandez MD,  [facility]   on 12/28/2023 at 1:57 PM    
of the activity; assistance is required to complete the activity  Moderate assistance= pt performs 50% of the activity; assistance is required to complete the activity  Maximal assistance = pt performs 25% of the activity; assistance is required to complete the activity  Dependent = pt requires total physical assistance to accomplish the task

## (undated) DEVICE — INTENDED FOR TISSUE SEPARATION, AND OTHER PROCEDURES THAT REQUIRE A SHARP SURGICAL BLADE TO PUNCTURE OR CUT.: Brand: BARD-PARKER ® CARBON RIB-BACK BLADES

## (undated) DEVICE — GOWN,AURORA,NONRNF,XL,30/CS: Brand: MEDLINE

## (undated) DEVICE — TOWEL,OR,DSP,ST,BLUE,STD,4/PK,20PK/CS: Brand: MEDLINE

## (undated) DEVICE — SPONGE,LAP,4"X18",XR,ST,5/PK,40PK/CS: Brand: MEDLINE INDUSTRIES, INC.

## (undated) DEVICE — GOWN,AURORA,NONREINFORCED,LARGE: Brand: MEDLINE

## (undated) DEVICE — LABEL MED MINI W/ MARKER

## (undated) DEVICE — PAD,ABDOMINAL,8"X10",ST,LF: Brand: MEDLINE

## (undated) DEVICE — GAUZE,SPONGE,4"X4",12PLY,STERILE,LF,2'S: Brand: MEDLINE

## (undated) DEVICE — TUBING, SUCTION, 1/4" X 10', STRAIGHT: Brand: MEDLINE

## (undated) DEVICE — GLOVE ORANGE PI 7 1/2   MSG9075

## (undated) DEVICE — SYRINGE IRRIG 60ML SFT PLIABLE BLB EZ TO GRP 1 HND USE W/

## (undated) DEVICE — GLOVE SURG SZ 75 STD WHT LTX SYN POLYMER BEAD REINF ANTI RL

## (undated) DEVICE — SINGLE PORT MANIFOLD: Brand: NEPTUNE 2

## (undated) DEVICE — MEDI-VAC YANKAUER SUCTION HANDLE W/BULBOUS TIP: Brand: CARDINAL HEALTH

## (undated) DEVICE — TRAY PREP DRY W/ PREM GLV 2 APPL 6 SPNG 2 UNDPD 1 OVERWRAP

## (undated) DEVICE — DBD-PACK,LITHOTOMY,PK II,AURORA: Brand: MEDLINE

## (undated) DEVICE — YANKAUER,BULB TIP,W/O VENT,RIGID,STERILE: Brand: MEDLINE

## (undated) DEVICE — ELECTRODE PT RET AD L9FT HI MOIST COND ADH HYDRGEL CORDED

## (undated) DEVICE — ELECTROSURGICAL PENCIL BUTTON SWITCH E-Z CLEAN COATED BLADE ELECTRODE 10 FT (3 M) CORD HOLSTER: Brand: MEGADYNE

## (undated) DEVICE — NEPTUNE E-SEP SMOKE EVACUATION PENCIL, COATED, 70MM BLADE, PUSH BUTTON SWITCH: Brand: NEPTUNE E-SEP

## (undated) DEVICE — GOWN,SIRUS,POLYRNF,BRTHSLV,XLN/XL,20/CS: Brand: MEDLINE

## (undated) DEVICE — COUNTER NDL 40 COUNT HLD 70 FOAM BLK ADH W/ MAG

## (undated) DEVICE — DRESSING NEG PRESSURE WND VAC

## (undated) DEVICE — SPONGE,LAP,18"X18",DLX,XR,ST,5/PK,40/PK: Brand: MEDLINE

## (undated) DEVICE — CAP TBNG TRACH WHT TRAC DISP VAC

## (undated) DEVICE — GAUZE,SPONGE,4"X4",16PLY,XRAY,STRL,LF: Brand: MEDLINE

## (undated) DEVICE — SIPS DUAL 2 MINUTE TIP